# Patient Record
Sex: MALE | Race: WHITE | HISPANIC OR LATINO | Employment: FULL TIME | URBAN - METROPOLITAN AREA
[De-identification: names, ages, dates, MRNs, and addresses within clinical notes are randomized per-mention and may not be internally consistent; named-entity substitution may affect disease eponyms.]

---

## 2017-06-28 ENCOUNTER — OFFICE VISIT (OUTPATIENT)
Dept: DIABETES | Facility: CLINIC | Age: 57
End: 2017-06-28
Payer: COMMERCIAL

## 2017-06-28 ENCOUNTER — LAB VISIT (OUTPATIENT)
Dept: LAB | Facility: HOSPITAL | Age: 57
End: 2017-06-28
Attending: PEDIATRICS
Payer: COMMERCIAL

## 2017-06-28 VITALS
DIASTOLIC BLOOD PRESSURE: 78 MMHG | BODY MASS INDEX: 31.34 KG/M2 | SYSTOLIC BLOOD PRESSURE: 142 MMHG | HEIGHT: 69 IN | WEIGHT: 211.63 LBS

## 2017-06-28 LAB
ALT SERPL W/O P-5'-P-CCNC: 42 U/L
AST SERPL-CCNC: 27 U/L
BASOPHILS # BLD AUTO: 0.05 K/UL
BASOPHILS NFR BLD: 0.7 %
C PEPTIDE SERPL-MCNC: 2.75 NG/ML
CHOLEST/HDLC SERPL: 6.2 {RATIO}
DIFFERENTIAL METHOD: NORMAL
EOSINOPHIL # BLD AUTO: 0.1 K/UL
EOSINOPHIL NFR BLD: 2 %
ERYTHROCYTE [DISTWIDTH] IN BLOOD BY AUTOMATED COUNT: 13 %
GLUCOSE SERPL-MCNC: 145 MG/DL
HCT VFR BLD AUTO: 45.9 %
HDL/CHOLESTEROL RATIO: 16.2 %
HDLC SERPL-MCNC: 179 MG/DL
HDLC SERPL-MCNC: 29 MG/DL
HGB BLD-MCNC: 15.1 G/DL
LDLC SERPL CALC-MCNC: 119.8 MG/DL
LYMPHOCYTES # BLD AUTO: 1.6 K/UL
LYMPHOCYTES NFR BLD: 23.1 %
MCH RBC QN AUTO: 29.7 PG
MCHC RBC AUTO-ENTMCNC: 32.9 %
MCV RBC AUTO: 90 FL
MONOCYTES # BLD AUTO: 0.5 K/UL
MONOCYTES NFR BLD: 7.2 %
NEUTROPHILS # BLD AUTO: 4.6 K/UL
NEUTROPHILS NFR BLD: 66.7 %
NONHDLC SERPL-MCNC: 150 MG/DL
PLATELET # BLD AUTO: 276 K/UL
PMV BLD AUTO: 10.5 FL
RBC # BLD AUTO: 5.09 M/UL
TRIGL SERPL-MCNC: 151 MG/DL
TSH SERPL DL<=0.005 MIU/L-ACNC: 2.54 UIU/ML
WBC # BLD AUTO: 6.84 K/UL

## 2017-06-28 PROCEDURE — 80061 LIPID PANEL: CPT

## 2017-06-28 PROCEDURE — 80069 RENAL FUNCTION PANEL: CPT

## 2017-06-28 PROCEDURE — 99999 PR PBB SHADOW E&M-EST. PATIENT-LVL III: CPT | Mod: PBBFAC,,, | Performed by: PHYSICIAN ASSISTANT

## 2017-06-28 PROCEDURE — 36415 COLL VENOUS BLD VENIPUNCTURE: CPT | Mod: PO

## 2017-06-28 PROCEDURE — 86341 ISLET CELL ANTIBODY: CPT

## 2017-06-28 PROCEDURE — 99214 OFFICE O/P EST MOD 30 MIN: CPT | Mod: S$GLB,,, | Performed by: PHYSICIAN ASSISTANT

## 2017-06-28 PROCEDURE — 84681 ASSAY OF C-PEPTIDE: CPT

## 2017-06-28 PROCEDURE — 83036 HEMOGLOBIN GLYCOSYLATED A1C: CPT

## 2017-06-28 PROCEDURE — 84460 ALANINE AMINO (ALT) (SGPT): CPT

## 2017-06-28 PROCEDURE — 85025 COMPLETE CBC W/AUTO DIFF WBC: CPT

## 2017-06-28 PROCEDURE — 82947 ASSAY GLUCOSE BLOOD QUANT: CPT

## 2017-06-28 PROCEDURE — 4010F ACE/ARB THERAPY RXD/TAKEN: CPT | Mod: S$GLB,,, | Performed by: PHYSICIAN ASSISTANT

## 2017-06-28 PROCEDURE — 84443 ASSAY THYROID STIM HORMONE: CPT

## 2017-06-28 PROCEDURE — 84450 TRANSFERASE (AST) (SGOT): CPT

## 2017-06-28 RX ORDER — ALLOPURINOL 100 MG/1
100 TABLET ORAL
COMMUNITY
End: 2017-10-16 | Stop reason: SDUPTHER

## 2017-06-28 RX ORDER — LOSARTAN POTASSIUM 50 MG/1
50 TABLET ORAL DAILY
COMMUNITY
End: 2017-06-28 | Stop reason: SDUPTHER

## 2017-06-28 RX ORDER — DAPAGLIFLOZIN 5 MG/1
5 TABLET, FILM COATED ORAL DAILY
Qty: 90 TABLET | Refills: 3 | Status: SHIPPED | OUTPATIENT
Start: 2017-06-28 | End: 2017-06-29 | Stop reason: CLARIF

## 2017-06-28 RX ORDER — DAPAGLIFLOZIN 5 MG/1
5 TABLET, FILM COATED ORAL DAILY
Qty: 30 TABLET | Refills: 11 | Status: SHIPPED | OUTPATIENT
Start: 2017-06-28 | End: 2017-06-28 | Stop reason: SDUPTHER

## 2017-06-28 RX ORDER — LOSARTAN POTASSIUM 50 MG/1
50 TABLET ORAL DAILY
Qty: 90 TABLET | Refills: 3 | Status: SHIPPED | OUTPATIENT
Start: 2017-06-28 | End: 2018-09-16 | Stop reason: SDUPTHER

## 2017-06-28 RX ORDER — METFORMIN HYDROCHLORIDE 500 MG/1
500 TABLET, EXTENDED RELEASE ORAL
Qty: 90 TABLET | Refills: 3 | Status: SHIPPED | OUTPATIENT
Start: 2017-06-28 | End: 2018-11-08

## 2017-06-28 RX ORDER — DOXAZOSIN 4 MG/1
2 TABLET ORAL DAILY
COMMUNITY
End: 2017-10-25 | Stop reason: SDUPTHER

## 2017-06-28 RX ORDER — PANTOPRAZOLE SODIUM 40 MG/1
40 TABLET, DELAYED RELEASE ORAL
COMMUNITY
End: 2018-11-16

## 2017-06-28 RX ORDER — CYCLOBENZAPRINE HCL 10 MG
10 TABLET ORAL 3 TIMES DAILY PRN
Qty: 30 TABLET | Refills: 1 | Status: SHIPPED | OUTPATIENT
Start: 2017-06-28 | End: 2018-03-27

## 2017-06-28 RX ORDER — ATORVASTATIN CALCIUM 20 MG/1
20 TABLET, FILM COATED ORAL EVERY OTHER DAY
COMMUNITY
End: 2018-11-08

## 2017-06-28 NOTE — TELEPHONE ENCOUNTER
----- Message from Uriah Woods sent at 6/28/2017  1:21 PM CDT -----  Contact: Sparkle from [facility]   States pt needs a refill on his BP med/ needs to have a new rx sent over     1. What is the name of the medication you are requesting? losartin  2. What is the dose? 50mg  3. How do you take the medication? Orally, topically, etc? Orally   4. How often do you take this medication? Once daily   5. Do you need a 30 day or 90 day supply? 90 day  6. How many refills are you requesting? 1   7. What is your preferred pharmacy and location of the pharmacy? [phone]  8. Who can we contact with further questions? Sparkle at [facility] [phone]//adair

## 2017-06-28 NOTE — PROGRESS NOTES
PCP: Primary Doctor No    Subjective:      HISTORY OF PRESENT ILLNESS:   Shelton Young Is a pleasant 57 y.o. White male, he is in clinic today to establish care for diabetes mellitus. He was diagnosed as a type II diabetic in 2016. He was offered to be enrolled in diabetes education classes however he declined stating that he gets this through his job.  He also voices concern about taking medication for diabetes stating that it overrides his body's ability to control her sugar on its own. I advised him that that is exactly what the definition of diabetes is.  Your body cannot control blood sugars in the fasting and post meal excursion of the blood sugar.  In had discussions on his ability to monitor his portion sizes and also his carbohydrate intake and he informs me that he has been able to do this in the past.  We also discussed the fact that he was not able to continue it so his diabetes is back to its previous state.  It is very important for him to manage his activity level, meal content and frequency, as well as medications to help as well.  I did advise him that I would need to check his biochemical markers for diabetes and diabetes complications and we would discuss more after I have reviewed them.    His comorbid conditions are, Diabetes Type 2, Hyperlipidemia and Obesity     He has the following diabetic complications, without complications    Blood glucose testing is not performed. He reports polyuria secondary to BPH, denies polydipsia, polyphagia, or blurred vision. Also denies nausea, vomiting, diarrhea, fever or hypoglycemic symptoms. He has not had any recent hospitalizations or significant hypoglycemia involving EMS or requiring assistance from others.       Blood Glucose reading in clinic: No results found for: POCGLU     Past Medical History:   Diagnosis Date    Borderline systolic HTN     Diabetes mellitus, type 2 Diagnosed 2015    Prediabetes      Family History   Problem Relation Age of  Onset    Diabetes Mother     Heart disease Mother     Hypertension Mother     Diabetes Father     Heart disease Father     Hypertension Father     Diabetes Brother     Hypertension Brother     Heart disease Brother      Social History     Social History    Marital status:      Spouse name: N/A    Number of children: N/A    Years of education: N/A     Occupational History    Not on file.     Social History Main Topics    Smoking status: Never Smoker    Smokeless tobacco: Not on file    Alcohol use Yes    Drug use: No    Sexual activity: Not on file     Other Topics Concern    Not on file     Social History Narrative    No narrative on file     DM MEDICATIONS:   Current Outpatient Prescriptions:     allopurinol (ZYLOPRIM) 100 MG tablet, Take 100 mg by mouth as needed., Disp: , Rfl:     ASCORBATE CALCIUM (VITAMIN C ORAL), Take by mouth every other day., Disp: , Rfl:     CALCIUM CARBONATE/VITAMIN D3 (VITAMIN D-3 ORAL), Take by mouth., Disp: , Rfl:     doxazosin (CARDURA) 4 MG tablet, Take 2 mg by mouth once daily., Disp: , Rfl:     GLUTAMINE ORAL, Take by mouth., Disp: , Rfl:     LACTOBAC NO.41/BIFIDOBACT NO.7 (PROBIOTIC-10 ORAL), Take by mouth., Disp: , Rfl:     losartan (COZAAR) 50 MG tablet, Take 50 mg by mouth once daily., Disp: , Rfl:     MAGNESIUM ORAL, Take by mouth., Disp: , Rfl:     POTASSIUM ORAL, Take by mouth every other day., Disp: , Rfl:     VITAMIN K2 ORAL, Take by mouth., Disp: , Rfl:     atorvastatin (LIPITOR) 20 MG tablet, Take 20 mg by mouth every other day., Disp: , Rfl:     cyclobenzaprine (FLEXERIL) 10 MG tablet, Take 1 tablet (10 mg total) by mouth 3 (three) times daily as needed for Muscle spasms., Disp: 30 tablet, Rfl: 1    dapagliflozin (FARXIGA) 5 mg Tab tablet, Take 1 tablet (5 mg total) by mouth once daily., Disp: 30 tablet, Rfl: 11    metformin (GLUCOPHAGE-XR) 500 MG 24 hr tablet, Take 1 tablet (500 mg total) by mouth daily with breakfast., Disp: 90  tablet, Rfl: 3    pantoprazole (PROTONIX) 40 MG tablet, Take 40 mg by mouth once daily., Disp: , Rfl:     Shelton is noncompliant much of the time with DM medications.     Shelton is noncompliant much of the time with lifestyle modifications to include activity and meal planning.     Review of Systems   Constitutional: Negative.  Negative for activity change, appetite change, chills, diaphoresis, fatigue, fever and unexpected weight change.   HENT: Negative.  Negative for dental problem, facial swelling, hearing loss, nosebleeds, trouble swallowing and voice change.    Eyes: Negative.  Negative for photophobia, pain, discharge, redness, itching and visual disturbance.   Respiratory: Negative.  Negative for cough, choking, chest tightness, shortness of breath and wheezing.    Cardiovascular: Negative.  Negative for chest pain, palpitations and leg swelling.   Gastrointestinal: Negative.  Negative for abdominal distention, abdominal pain, blood in stool, constipation, diarrhea, nausea and vomiting.   Endocrine: Negative.  Negative for cold intolerance, heat intolerance, polydipsia, polyphagia and polyuria.   Genitourinary: Negative.  Negative for decreased urine volume, difficulty urinating, dysuria, frequency, scrotal swelling, testicular pain and urgency.   Musculoskeletal: Negative.  Negative for arthralgias, back pain, gait problem, joint swelling, myalgias, neck pain and neck stiffness.   Skin: Negative.  Negative for color change, pallor, rash and wound.   Allergic/Immunologic: Negative.  Negative for environmental allergies, food allergies and immunocompromised state.   Neurological: Negative.  Negative for dizziness, tremors, seizures, syncope, facial asymmetry, speech difficulty, weakness, light-headedness, numbness and headaches.   Hematological: Negative.  Negative for adenopathy. Does not bruise/bleed easily.   Psychiatric/Behavioral: Negative.  Negative for agitation, behavioral problems, confusion,  "decreased concentration, dysphoric mood, hallucinations, self-injury, sleep disturbance and suicidal ideas. The patient is not nervous/anxious and is not hyperactive.        Objective:   Last 3 sets of Vitals:  Vitals - 1 value per visit 6/28/2017   SYSTOLIC 142   DIASTOLIC 78   Weight (lb) 211.64   Weight (kg) 96   HEIGHT 5' 9"   BODY MASS INDEX 31.25   VISIT REPORT -          Physical Exam   Constitutional: He is oriented to person, place, and time. He appears well-developed and well-nourished. He is cooperative.  Non-toxic appearance. He does not have a sickly appearance. He does not appear ill. No distress. He is not intubated.   HENT:   Head: Normocephalic and atraumatic. Not macrocephalic and not microcephalic. Head is without raccoon's eyes, without Boone's sign, without abrasion, without contusion, without laceration, without right periorbital erythema and without left periorbital erythema. Hair is normal.   Right Ear: External ear normal. No lacerations. No drainage, swelling or tenderness. No foreign bodies. No mastoid tenderness. Tympanic membrane is not injected, not scarred, not perforated, not erythematous, not retracted and not bulging. Tympanic membrane mobility is normal. No middle ear effusion. No hemotympanum. No decreased hearing is noted.   Left Ear: External ear normal. No lacerations. No drainage, swelling or tenderness. No foreign bodies. No mastoid tenderness. Tympanic membrane is not injected, not scarred, not perforated, not erythematous, not retracted and not bulging. Tympanic membrane mobility is normal.  No middle ear effusion. No hemotympanum. No decreased hearing is noted.   Nose: Nose normal.   Mouth/Throat: Oropharynx is clear and moist.   Eyes: EOM and lids are normal. Pupils are equal, round, and reactive to light. Right eye exhibits no chemosis, no discharge, no exudate and no hordeolum. No foreign body present in the right eye. Left eye exhibits no chemosis, no discharge, no " exudate and no hordeolum. No foreign body present in the left eye. Right conjunctiva is not injected. Right conjunctiva has no hemorrhage. Left conjunctiva is not injected. Left conjunctiva has no hemorrhage. No scleral icterus. Right eye exhibits normal extraocular motion and no nystagmus. Left eye exhibits normal extraocular motion and no nystagmus. Right pupil is round and reactive. Left pupil is round and reactive. Pupils are equal.   Neck: Normal range of motion, full passive range of motion without pain and phonation normal. Neck supple. Normal carotid pulses, no hepatojugular reflux and no JVD present. No tracheal tenderness, no spinous process tenderness and no muscular tenderness present. Carotid bruit is not present. No neck rigidity. No tracheal deviation, no edema, no erythema and normal range of motion present. No thyroid mass and no thyromegaly present.   Cardiovascular: Normal rate, regular rhythm, normal heart sounds and intact distal pulses.   No extrasystoles are present. PMI is not displaced.  Exam reveals no gallop, no friction rub and no decreased pulses.    No murmur heard.  Pulses:       Carotid pulses are 2+ on the right side, and 2+ on the left side.       Radial pulses are 2+ on the right side, and 2+ on the left side.        Dorsalis pedis pulses are 2+ on the right side, and 2+ on the left side.        Posterior tibial pulses are 2+ on the right side, and 2+ on the left side.   Pulmonary/Chest: Effort normal and breath sounds normal. No accessory muscle usage or stridor. No apnea, no tachypnea and no bradypnea. He is not intubated. No respiratory distress. He has no decreased breath sounds. He has no wheezes. He has no rhonchi. He has no rales. He exhibits no tenderness.   Abdominal: Soft. Bowel sounds are normal. He exhibits no shifting dullness, no distension, no pulsatile liver, no fluid wave, no abdominal bruit, no ascites, no pulsatile midline mass and no mass. There is no  hepatosplenomegaly, splenomegaly or hepatomegaly. There is no tenderness. There is no rigidity, no rebound, no guarding, no CVA tenderness and no tenderness at McBurney's point. No hernia. Hernia confirmed negative in the ventral area.   Musculoskeletal: Normal range of motion. He exhibits no edema or tenderness.        Right foot: There is normal range of motion and no deformity.        Left foot: There is normal range of motion and no deformity.   Feet:   Right Foot:   Protective Sensation: 6 sites tested. 6 sites sensed.   Skin Integrity: Negative for ulcer, blister, skin breakdown, erythema, warmth, callus or dry skin.   Left Foot:   Protective Sensation: 6 sites tested. 6 sites sensed.   Skin Integrity: Negative for ulcer, blister, skin breakdown, erythema, warmth, callus or dry skin.   Lymphadenopathy:        Head (right side): No submental, no submandibular, no tonsillar, no preauricular, no posterior auricular and no occipital adenopathy present.        Head (left side): No submental, no submandibular, no tonsillar, no preauricular, no posterior auricular and no occipital adenopathy present.     He has no cervical adenopathy.        Right cervical: No superficial cervical, no deep cervical and no posterior cervical adenopathy present.       Left cervical: No superficial cervical, no deep cervical and no posterior cervical adenopathy present.   Neurological: He is alert and oriented to person, place, and time. He has normal reflexes. He displays no atrophy and no tremor. No cranial nerve deficit or sensory deficit. He exhibits normal muscle tone. He displays no seizure activity. Coordination and gait normal.   Reflex Scores:       Bicep reflexes are 2+ on the right side and 2+ on the left side.       Brachioradialis reflexes are 2+ on the right side and 2+ on the left side.       Patellar reflexes are 2+ on the right side and 2+ on the left side.  Skin: Skin is warm and dry. No rash noted. No erythema. No  pallor.   Psychiatric: He has a normal mood and affect. His mood appears not anxious. His affect is not angry, not blunt, not labile and not inappropriate. His speech is not rapid and/or pressured, not delayed, not tangential and not slurred. He is not agitated, not aggressive, not hyperactive, not slowed, not withdrawn, not actively hallucinating and not combative. Thought content is not paranoid and not delusional. Cognition and memory are not impaired. He does not express impulsivity or inappropriate judgment. He does not exhibit a depressed mood. He expresses no homicidal and no suicidal ideation. He expresses no suicidal plans and no homicidal plans. He is communicative. He exhibits normal recent memory and normal remote memory. He is attentive.         STANDARDS OF CARE:  Eye doctor: None, last exam Has appointment coming up.  Dental exam: Recommend regular exams; denies gums bleeding.  Podiatry doctor:    ACTIVITY LEVEL: He exercises never since on the job injury of his back.  MEAL PLANNING: Number of meals per day - 3. Number of snacks per day - 2.  Per dietary recall, patient is not limiting carbohydrates, saturated fats and sodium.   BLOOD GLUCOSE TESTING: Self-monitoring with   SOCIAL HISTORY: . Lives with spouse. 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 Impediments in learning class environment - NONE.  2. Needs improvement in self-care management skills.    1. Uncontrolled type 2 diabetes mellitus without complication, without long-term current use of insulin          Plan:   Shelton Young is seen today for   1. Uncontrolled type 2 diabetes mellitus without complication, without long-term current use of insulin      We have discussed the etiology and treatment options associated with the diagnosis as well as alternatives. He has elected the following treatments.     Uncontrolled type 2 diabetes mellitus without  complication, without long-term current use of insulin  -     Hemoglobin A1c; Future; Expected date: 06/28/2017  -     Renal function panel; Future; Expected date: 06/28/2017  -     ALT (SGPT); Future; Expected date: 06/28/2017  -     AST (SGOT); Future; Expected date: 06/28/2017  -     Lipid panel; Future; Expected date: 06/28/2017  -     TSH; Future; Expected date: 06/28/2017  -     Microalbumin/creatinine urine ratio; Future; Expected date: 06/28/2017  -     Glucose, fasting; Future; Expected date: 06/28/2017  -     C-peptide; Future; Expected date: 06/28/2017  -     Glutamic acid decarboxylase; Future; Expected date: 06/28/2017  -     CBC auto differential; Future; Expected date: 06/28/2017  -     dapagliflozin (FARXIGA) 5 mg Tab tablet; Take 1 tablet (5 mg total) by mouth once daily.  Dispense: 30 tablet; Refill: 11  -     cyclobenzaprine (FLEXERIL) 10 MG tablet; Take 1 tablet (10 mg total) by mouth 3 (three) times daily as needed for Muscle spasms.  Dispense: 30 tablet; Refill: 1  -     metformin (GLUCOPHAGE-XR) 500 MG 24 hr tablet; Take 1 tablet (500 mg total) by mouth daily with breakfast.  Dispense: 90 tablet; Refill: 3    1.) Patient was instructed to monitor blood glucose twice daily, fasting, and 2 hour post meal. Reminded to bring BG records or meter to each visit for review.  2.) Reviewed pathophysiology of type 2 diabetes, complications related to the disease, importance of annual dilated eye exam and self daily foot examination.  3.) Continue medications as prescribed Farxiga and Metformin. [facility] MyChart or Phone review in 1 week with BG records for adjustment of medication.  4.) Reviewed carb counting, portion control, importance of spacing meals throughout the day to prevent post prandial elevations. Recommended low saturated fat, low sodium diet to aid in control of hypertension and cholesterol.  5.) Discussed activity, benefits, methods, and precautions. Recommended patient start/continue some  form of exercise and increase as tolerated to 60 minutes per day to facilitate weight loss and aid in control of BGs. Also reminded patient of WHO recommendation of 10,000 steps daily as a goal.   6.) A1C, TSH, Lipid Panel, AST; ALT, Renal panel with eGFR and Micro/Creatinine.  7.) Return to clinic in 3 months for follow up. Advised patient to call clinic with any questions or concerns.    A total of 60 minutes was spent in face to face time, of which 50 % was spent in counseling patient on disease process, complications, treatment, and side effects of medications.    The patient was explained the above plan and given opportunity to ask questions.  He understands, chooses and consents to this plan and accepts all the risks, which include but are not limited to the risks mentioned above.   He understands the alternative of having no testing, interventions or treatments at this time. He left content and without further questions.

## 2017-06-29 LAB
ALBUMIN SERPL BCP-MCNC: 3.8 G/DL
ANION GAP SERPL CALC-SCNC: 8 MMOL/L
BUN SERPL-MCNC: 15 MG/DL
CALCIUM SERPL-MCNC: 9.3 MG/DL
CHLORIDE SERPL-SCNC: 105 MMOL/L
CO2 SERPL-SCNC: 29 MMOL/L
CREAT SERPL-MCNC: 1.2 MG/DL
EST. GFR  (AFRICAN AMERICAN): >60 ML/MIN/1.73 M^2
EST. GFR  (NON AFRICAN AMERICAN): >60 ML/MIN/1.73 M^2
ESTIMATED AVG GLUCOSE: 148 MG/DL
GLUCOSE SERPL-MCNC: 138 MG/DL
HBA1C MFR BLD HPLC: 6.8 %
PHOSPHATE SERPL-MCNC: 2.7 MG/DL
POTASSIUM SERPL-SCNC: 4.1 MMOL/L
SODIUM SERPL-SCNC: 142 MMOL/L

## 2017-06-30 LAB — GAD65 AB SER-SCNC: 0 NMOL/L

## 2017-10-17 RX ORDER — ALLOPURINOL 100 MG/1
100 TABLET ORAL DAILY
Qty: 30 TABLET | Refills: 6 | Status: SHIPPED | OUTPATIENT
Start: 2017-10-17 | End: 2017-11-29 | Stop reason: SDUPTHER

## 2017-10-17 RX ORDER — ALLOPURINOL 100 MG/1
100 TABLET ORAL
Qty: 30 TABLET | Refills: 6 | Status: SHIPPED | OUTPATIENT
Start: 2017-10-17 | End: 2017-10-17 | Stop reason: SDUPTHER

## 2017-10-17 NOTE — TELEPHONE ENCOUNTER
----- Message from Matthew Morel sent at 10/17/2017 10:35 AM CDT -----  Contact: [facility]/ [place]  Pharmacy is calling nurse staff regarding clarification on the RX Allopurinol with the dosage and the mg.'s of medication . Please call or fax back to the Pharmacy    [facility] [address] [place] [address]  [address] [facility] [place] [address]  Phone: [phone] Fax: [phone]

## 2017-10-25 RX ORDER — DOXAZOSIN 4 MG/1
2 TABLET ORAL DAILY
Qty: 30 TABLET | Refills: 6 | Status: SHIPPED | OUTPATIENT
Start: 2017-10-25 | End: 2017-11-29 | Stop reason: SDUPTHER

## 2017-11-28 NOTE — PROGRESS NOTES
Subjective:      Patient ID: Shelton Young is a 57 y.o. male.    PCP: Primary Doctor No      Shelton Young is a pleasant 57 y.o. male presenting to follow up on diabetes mellitus. He has had diabetes for 2 or more years. His last visit in Diabetes Management was 6/28/2017. Since that time he has had significant improvement in his glycemia. His blood sugar range fasting has been 120's and 2 hour post meal has been 140's, and he has been monitoring 2 times per day. His current concerns are glycemic control.    He denies any hospital admissions, emergency room visits, hypoglycemia, syncope, diaphoresis, chest pain, or dyspnea.    He has lost 16 pounds since last visit. His BMI is 28.91    His blood sugar in the clinic today was:   Lab Results   Component Value Date    POCGLU 120 (A) 11/29/2017     We discussed the American diabetes Association recommendations:  hemoglobin A1c below 7.0%; all diabetics should be on statins unless contraindicated; one aspirin daily unless contraindicated; fasting blood sugar between 80 and 130 mg/dL; postprandial blood sugar below 180 mg/dl; prevention of hypoglycemia, may adjust goals to higher levels if persistent; ACE or ARB therapy if not contraindicated; and maintain in an ideal body weight with BMI below 25.    Shelton is compliant most of the time with DM medications.     Shelton is compliant most of the time with lifestyle modifications to include activity and meal planning.       Current Outpatient Prescriptions:     allopurinol (ZYLOPRIM) 100 MG tablet, Take 1 tablet (100 mg total) by mouth once daily., Disp: 30 tablet, Rfl: 6    ASCORBATE CALCIUM (VITAMIN C ORAL), Take by mouth every other day., Disp: , Rfl:     atorvastatin (LIPITOR) 20 MG tablet, Take 20 mg by mouth every other day., Disp: , Rfl:     CALCIUM CARBONATE/VITAMIN D3 (VITAMIN D-3 ORAL), Take by mouth., Disp: , Rfl:     cyclobenzaprine (FLEXERIL) 10 MG tablet, Take 1 tablet (10 mg total) by mouth 3  (three) times daily as needed for Muscle spasms., Disp: 30 tablet, Rfl: 1    doxazosin (CARDURA) 4 MG tablet, Take 0.5 tablets (2 mg total) by mouth once daily., Disp: 30 tablet, Rfl: 6    empagliflozin (JARDIANCE) 10 mg Tab, Take 10 mg by mouth once daily., Disp: 30 tablet, Rfl: 6    GLUTAMINE ORAL, Take by mouth., Disp: , Rfl:     LACTOBAC NO.41/BIFIDOBACT NO.7 (PROBIOTIC-10 ORAL), Take by mouth., Disp: , Rfl:     losartan (COZAAR) 50 MG tablet, Take 1 tablet (50 mg total) by mouth once daily., Disp: 90 tablet, Rfl: 3    MAGNESIUM ORAL, Take by mouth., Disp: , Rfl:     metformin (GLUCOPHAGE-XR) 500 MG 24 hr tablet, Take 1 tablet (500 mg total) by mouth daily with breakfast., Disp: 90 tablet, Rfl: 3    pantoprazole (PROTONIX) 40 MG tablet, Take 40 mg by mouth once daily., Disp: , Rfl:     POTASSIUM ORAL, Take by mouth every other day., Disp: , Rfl:     VITAMIN K2 ORAL, Take by mouth., Disp: , Rfl:     STANDARDS OF CARE:  Eye doctor: None, last exam Has appointment coming up.  Dental exam: Recommend regular exams; denies gums bleeding.  Podiatry doctor:     ACTIVITY LEVEL: He exercises never since on the job injury of his back.  MEAL PLANNING: Number of meals per day - 3. Number of snacks per day - 2.  Per dietary recall, patient is not limiting carbohydrates, saturated fats and sodium.   BLOOD GLUCOSE TESTING: Self-monitoring with   SOCIAL HISTORY: . Lives with spouse. 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    Lab Results   Component Value Date    WBC 6.84 06/28/2017    HGB 15.1 06/28/2017    HCT 45.9 06/28/2017     06/28/2017    CHOL 179 06/28/2017    TRIG 151 (H) 06/28/2017     HDL 29 (L) 06/28/2017    ALT 42 06/28/2017    AST 27 06/28/2017     06/28/2017    K 4.1 06/28/2017     06/28/2017    CREATININE 1.2 06/28/2017    ESTGFRAFRICA >60.0 06/28/2017    EGFRNONAA >60.0 06/28/2017    BUN 15 06/28/2017    CO2 29 06/28/2017    TSH 2.542 06/28/2017     (H) 06/28/2017       Lab Results   Component Value Date    HGBA1C 6.8 (H) 06/28/2017       Lab Results   Component Value Date    GLUTAMICACID 0.00 06/28/2017    CPEPTIDE 2.75 06/28/2017     Lab Results   Component Value Date    TSH 2.542 06/28/2017     Lab Results   Component Value Date    CALCIUM 9.3 06/28/2017    PHOS 2.7 06/28/2017       Review of patient's allergies indicates:  No Known Allergies    Past Medical History:   Diagnosis Date    Borderline systolic HTN     Diabetes mellitus, type 2 Diagnosed 2015    Prediabetes        Review of Systems   Constitutional: Negative.  Negative for activity change, appetite change, chills, diaphoresis, fatigue, fever and unexpected weight change.   HENT: Negative.  Negative for dental problem, facial swelling, hearing loss, nosebleeds, trouble swallowing and voice change.    Eyes: Negative.  Negative for photophobia, pain, discharge, redness, itching and visual disturbance.   Respiratory: Negative.  Negative for cough, choking, chest tightness, shortness of breath and wheezing.    Cardiovascular: Negative.  Negative for chest pain, palpitations and leg swelling.   Gastrointestinal: Negative.  Negative for abdominal distention, abdominal pain, blood in stool, constipation, diarrhea, nausea and vomiting.   Endocrine: Negative.  Negative for cold intolerance, heat intolerance, polydipsia, polyphagia and polyuria.   Genitourinary: Negative.  Negative for decreased urine volume, difficulty urinating, dysuria, frequency, scrotal swelling, testicular pain and urgency.   Musculoskeletal: Negative.  Negative for arthralgias, back pain, gait problem, joint swelling, myalgias, neck pain and  "neck stiffness.   Skin: Negative.  Negative for color change, pallor, rash and wound.   Allergic/Immunologic: Negative.  Negative for environmental allergies, food allergies and immunocompromised state.   Neurological: Negative.  Negative for dizziness, tremors, seizures, syncope, facial asymmetry, speech difficulty, weakness, light-headedness, numbness and headaches.   Hematological: Negative.  Negative for adenopathy. Does not bruise/bleed easily.   Psychiatric/Behavioral: Negative.  Negative for agitation, behavioral problems, confusion, decreased concentration, dysphoric mood, hallucinations, self-injury, sleep disturbance and suicidal ideas. The patient is not nervous/anxious and is not hyperactive.       Objective:     Vitals - 1 value per visit 6/28/2017   SYSTOLIC 142   DIASTOLIC 78   Weight (lb) 211.64   Weight (kg) 96   HEIGHT 5' 9"   BODY MASS INDEX 31.25   VISIT REPORT -       Physical Exam   Constitutional: He is oriented to person, place, and time. He appears well-developed and well-nourished. He is cooperative.  Non-toxic appearance. He does not have a sickly appearance. He does not appear ill. No distress. He is not intubated.   HENT:   Head: Normocephalic and atraumatic. Not macrocephalic and not microcephalic. Head is without raccoon's eyes, without Boone's sign, without abrasion, without contusion, without laceration, without right periorbital erythema and without left periorbital erythema. Hair is normal.   Right Ear: External ear normal. No lacerations. No drainage, swelling or tenderness. No foreign bodies. No mastoid tenderness. Tympanic membrane is not injected, not scarred, not perforated, not erythematous, not retracted and not bulging. Tympanic membrane mobility is normal. No middle ear effusion. No hemotympanum. No decreased hearing is noted.   Left Ear: External ear normal. No lacerations. No drainage, swelling or tenderness. No foreign bodies. No mastoid tenderness. Tympanic membrane " is not injected, not scarred, not perforated, not erythematous, not retracted and not bulging. Tympanic membrane mobility is normal.  No middle ear effusion. No hemotympanum. No decreased hearing is noted.   Nose: Nose normal.   Mouth/Throat: Oropharynx is clear and moist.   Eyes: EOM and lids are normal. Pupils are equal, round, and reactive to light. Right eye exhibits no chemosis, no discharge, no exudate and no hordeolum. No foreign body present in the right eye. Left eye exhibits no chemosis, no discharge, no exudate and no hordeolum. No foreign body present in the left eye. Right conjunctiva is not injected. Right conjunctiva has no hemorrhage. Left conjunctiva is not injected. Left conjunctiva has no hemorrhage. No scleral icterus. Right eye exhibits normal extraocular motion and no nystagmus. Left eye exhibits normal extraocular motion and no nystagmus. Right pupil is round and reactive. Left pupil is round and reactive. Pupils are equal.   Neck: Normal range of motion, full passive range of motion without pain and phonation normal. Neck supple. Normal carotid pulses, no hepatojugular reflux and no JVD present. No tracheal tenderness, no spinous process tenderness and no muscular tenderness present. Carotid bruit is not present. No neck rigidity. No tracheal deviation, no edema, no erythema and normal range of motion present. No thyroid mass and no thyromegaly present.   Cardiovascular: Normal rate, regular rhythm, normal heart sounds and intact distal pulses.   No extrasystoles are present. PMI is not displaced.  Exam reveals no gallop, no friction rub and no decreased pulses.    No murmur heard.  Pulses:       Carotid pulses are 2+ on the right side, and 2+ on the left side.       Radial pulses are 2+ on the right side, and 2+ on the left side.        Dorsalis pedis pulses are 2+ on the right side, and 2+ on the left side.        Posterior tibial pulses are 2+ on the right side, and 2+ on the left side.    Pulmonary/Chest: Effort normal and breath sounds normal. No accessory muscle usage or stridor. No apnea, no tachypnea and no bradypnea. He is not intubated. No respiratory distress. He has no decreased breath sounds. He has no wheezes. He has no rhonchi. He has no rales. He exhibits no tenderness.   Abdominal: Soft. Bowel sounds are normal. He exhibits no shifting dullness, no distension, no pulsatile liver, no fluid wave, no abdominal bruit, no ascites, no pulsatile midline mass and no mass. There is no hepatosplenomegaly, splenomegaly or hepatomegaly. There is no tenderness. There is no rigidity, no rebound, no guarding, no CVA tenderness and no tenderness at McBurney's point. No hernia. Hernia confirmed negative in the ventral area.   Musculoskeletal: Normal range of motion. He exhibits no edema or tenderness.        Right foot: There is normal range of motion and no deformity.        Left foot: There is normal range of motion and no deformity.   Feet:   Right Foot:   Protective Sensation: 6 sites tested. 6 sites sensed.   Skin Integrity: Negative for ulcer, blister, skin breakdown, erythema, warmth, callus or dry skin.   Left Foot:   Protective Sensation: 6 sites tested. 6 sites sensed.   Skin Integrity: Negative for ulcer, blister, skin breakdown, erythema, warmth, callus or dry skin.   Lymphadenopathy:        Head (right side): No submental, no submandibular, no tonsillar, no preauricular, no posterior auricular and no occipital adenopathy present.        Head (left side): No submental, no submandibular, no tonsillar, no preauricular, no posterior auricular and no occipital adenopathy present.     He has no cervical adenopathy.        Right cervical: No superficial cervical, no deep cervical and no posterior cervical adenopathy present.       Left cervical: No superficial cervical, no deep cervical and no posterior cervical adenopathy present.   Neurological: He is alert and oriented to person, place, and  time. He has normal reflexes. He displays no atrophy and no tremor. No cranial nerve deficit or sensory deficit. He exhibits normal muscle tone. He displays no seizure activity. Coordination and gait normal.   Reflex Scores:       Bicep reflexes are 2+ on the right side and 2+ on the left side.       Brachioradialis reflexes are 2+ on the right side and 2+ on the left side.       Patellar reflexes are 2+ on the right side and 2+ on the left side.  Skin: Skin is warm and dry. No rash noted. No erythema. No pallor.   Psychiatric: He has a normal mood and affect. His mood appears not anxious. His affect is not angry, not blunt, not labile and not inappropriate. His speech is not rapid and/or pressured, not delayed, not tangential and not slurred. He is not agitated, not aggressive, not hyperactive, not slowed, not withdrawn, not actively hallucinating and not combative. Thought content is not paranoid and not delusional. Cognition and memory are not impaired. He does not express impulsivity or inappropriate judgment. He does not exhibit a depressed mood. He expresses no homicidal and no suicidal ideation. He expresses no suicidal plans and no homicidal plans. He is communicative. He exhibits normal recent memory and normal remote memory. He is attentive.     Assessment:     1. Uncontrolled type 2 diabetes mellitus with hyperglycemia, without long-term current use of insulin       Plan:     Shelton Young is seen today for   1. Uncontrolled type 2 diabetes mellitus with hyperglycemia, without long-term current use of insulin      We have discussed the etiology and treatment options associated with the diagnosis as well as alternatives. He has elected the following treatments.     Uncontrolled type 2 diabetes mellitus with hyperglycemia, without long-term current use of insulin  -     POCT glucose  -     Hemoglobin A1c; Future; Expected date: 11/29/2017  -     Renal function panel; Future; Expected date: 11/29/2017  -      ALT (SGPT); Future; Expected date: 11/29/2017  -     AST (SGOT); Future; Expected date: 11/29/2017  -     Lipid panel; Future; Expected date: 11/29/2017  -     TSH; Future; Expected date: 11/29/2017  -     Microalbumin/creatinine urine ratio; Future; Expected date: 11/29/2017  -     CBC auto differential; Future; Expected date: 11/29/2017    Other orders  -     allopurinol (ZYLOPRIM) 100 MG tablet; Take 1 tablet (100 mg total) by mouth once daily.  Dispense: 30 tablet; Refill: 6  -     doxazosin (CARDURA) 4 MG tablet; Take 0.5 tablets (2 mg total) by mouth once daily.  Dispense: 30 tablet; Refill: 6      1.) Patient was instructed to monitor blood glucose twice daily, fasting, and 2 hour post meal; if on Multiple Daily Injections (MDI) he will need to have pre-meal blood glucose as well. Reminded to bring BG meter or record to each visit for review.  2.) Reviewed pathophysiology of diabetes, complications related to the disease, importance of annual dilated eye exam and self daily foot examination.  3.) Continue medications as prescribed Metformin and Jardiance. [facility] MyChart or Phone review in 1 week with [facility] records for adjustment of medication.  4.) Advised patient to continue to follow up with Dietician/CDE as directed.  5.) Discussed activity, benefits, methods, and precautions. Recommended patient start/continue some form of exercise and increase as tolerated to 60 minutes per day to facilitate weight loss and aid in control of BGs. Also reminded patient of WHO recommendation of 10,000 steps daily as a goal.   6.) A1C, TSH, Lipid Panel, CMP/renal panel with eGFR and Micro/Creatinine prior to next visit, if not already done.  7.) Return to clinic in 4 months for follow up. Advised patient to call clinic with any questions or concerns.    A total of 30 minutes was spent in face to face time, of which 50 % was spent in counseling patient on disease process, complications, treatment, and side effects of  medications.    The patient was explained the above plan and given opportunity to ask questions.  He understands, chooses and consents to this plan and accepts all the risks, which include but are not limited to the risks mentioned above.   He understands the alternative of having no testing, interventions or treatments at this time. He left content and without further questions.

## 2017-11-29 ENCOUNTER — OFFICE VISIT (OUTPATIENT)
Dept: DIABETES | Facility: CLINIC | Age: 57
End: 2017-11-29
Payer: COMMERCIAL

## 2017-11-29 ENCOUNTER — LAB VISIT (OUTPATIENT)
Dept: LAB | Facility: HOSPITAL | Age: 57
End: 2017-11-29
Attending: PHYSICIAN ASSISTANT
Payer: COMMERCIAL

## 2017-11-29 VITALS
DIASTOLIC BLOOD PRESSURE: 80 MMHG | SYSTOLIC BLOOD PRESSURE: 130 MMHG | WEIGHT: 195.75 LBS | HEIGHT: 69 IN | BODY MASS INDEX: 28.99 KG/M2

## 2017-11-29 DIAGNOSIS — E11.65 UNCONTROLLED TYPE 2 DIABETES MELLITUS WITH HYPERGLYCEMIA, WITHOUT LONG-TERM CURRENT USE OF INSULIN: ICD-10-CM

## 2017-11-29 DIAGNOSIS — E11.65 UNCONTROLLED TYPE 2 DIABETES MELLITUS WITH HYPERGLYCEMIA, WITHOUT LONG-TERM CURRENT USE OF INSULIN: Primary | ICD-10-CM

## 2017-11-29 LAB
ALBUMIN SERPL BCP-MCNC: 4 G/DL
ALT SERPL W/O P-5'-P-CCNC: 19 U/L
ANION GAP SERPL CALC-SCNC: 8 MMOL/L
AST SERPL-CCNC: 22 U/L
BASOPHILS # BLD AUTO: 0.07 K/UL
BASOPHILS NFR BLD: 1.3 %
BUN SERPL-MCNC: 16 MG/DL
CALCIUM SERPL-MCNC: 9.3 MG/DL
CHLORIDE SERPL-SCNC: 105 MMOL/L
CHOLEST SERPL-MCNC: 173 MG/DL
CHOLEST/HDLC SERPL: 5.1 {RATIO}
CO2 SERPL-SCNC: 28 MMOL/L
CREAT SERPL-MCNC: 1.1 MG/DL
DIFFERENTIAL METHOD: NORMAL
EOSINOPHIL # BLD AUTO: 0.1 K/UL
EOSINOPHIL NFR BLD: 2.3 %
ERYTHROCYTE [DISTWIDTH] IN BLOOD BY AUTOMATED COUNT: 12.4 %
EST. GFR  (AFRICAN AMERICAN): >60 ML/MIN/1.73 M^2
EST. GFR  (NON AFRICAN AMERICAN): >60 ML/MIN/1.73 M^2
ESTIMATED AVG GLUCOSE: 128 MG/DL
GLUCOSE SERPL-MCNC: 120 MG/DL (ref 70–110)
GLUCOSE SERPL-MCNC: 130 MG/DL
HBA1C MFR BLD HPLC: 6.1 %
HCT VFR BLD AUTO: 44.8 %
HDLC SERPL-MCNC: 34 MG/DL
HDLC SERPL: 19.7 %
HGB BLD-MCNC: 15 G/DL
IMM GRANULOCYTES # BLD AUTO: 0.01 K/UL
IMM GRANULOCYTES NFR BLD AUTO: 0.2 %
LDLC SERPL CALC-MCNC: 113 MG/DL
LYMPHOCYTES # BLD AUTO: 1.4 K/UL
LYMPHOCYTES NFR BLD: 25.3 %
MCH RBC QN AUTO: 29.5 PG
MCHC RBC AUTO-ENTMCNC: 33.5 G/DL
MCV RBC AUTO: 88 FL
MONOCYTES # BLD AUTO: 0.4 K/UL
MONOCYTES NFR BLD: 7.3 %
NEUTROPHILS # BLD AUTO: 3.4 K/UL
NEUTROPHILS NFR BLD: 63.6 %
NONHDLC SERPL-MCNC: 139 MG/DL
NRBC BLD-RTO: 0 /100 WBC
PHOSPHATE SERPL-MCNC: 2.9 MG/DL
PLATELET # BLD AUTO: 259 K/UL
PMV BLD AUTO: 9.9 FL
POTASSIUM SERPL-SCNC: 4.2 MMOL/L
RBC # BLD AUTO: 5.08 M/UL
SODIUM SERPL-SCNC: 141 MMOL/L
TRIGL SERPL-MCNC: 130 MG/DL
TSH SERPL DL<=0.005 MIU/L-ACNC: 1.64 UIU/ML
WBC # BLD AUTO: 5.33 K/UL

## 2017-11-29 PROCEDURE — 84460 ALANINE AMINO (ALT) (SGPT): CPT

## 2017-11-29 PROCEDURE — 84450 TRANSFERASE (AST) (SGOT): CPT

## 2017-11-29 PROCEDURE — 85025 COMPLETE CBC W/AUTO DIFF WBC: CPT

## 2017-11-29 PROCEDURE — 84443 ASSAY THYROID STIM HORMONE: CPT

## 2017-11-29 PROCEDURE — 80069 RENAL FUNCTION PANEL: CPT

## 2017-11-29 PROCEDURE — 83036 HEMOGLOBIN GLYCOSYLATED A1C: CPT

## 2017-11-29 PROCEDURE — 82948 REAGENT STRIP/BLOOD GLUCOSE: CPT | Mod: S$GLB,,, | Performed by: PHYSICIAN ASSISTANT

## 2017-11-29 PROCEDURE — 80061 LIPID PANEL: CPT

## 2017-11-29 PROCEDURE — 36415 COLL VENOUS BLD VENIPUNCTURE: CPT | Mod: PO

## 2017-11-29 PROCEDURE — 99999 PR PBB SHADOW E&M-EST. PATIENT-LVL III: CPT | Mod: PBBFAC,,, | Performed by: PHYSICIAN ASSISTANT

## 2017-11-29 PROCEDURE — 99214 OFFICE O/P EST MOD 30 MIN: CPT | Mod: S$GLB,,, | Performed by: PHYSICIAN ASSISTANT

## 2017-11-29 RX ORDER — ALLOPURINOL 100 MG/1
100 TABLET ORAL DAILY
Qty: 30 TABLET | Refills: 6 | Status: SHIPPED | OUTPATIENT
Start: 2017-11-29 | End: 2018-11-16 | Stop reason: SDUPTHER

## 2017-11-29 RX ORDER — DOXAZOSIN 4 MG/1
2 TABLET ORAL DAILY
Qty: 30 TABLET | Refills: 6 | Status: SHIPPED | OUTPATIENT
Start: 2017-11-29 | End: 2018-03-27

## 2018-03-14 ENCOUNTER — OFFICE VISIT (OUTPATIENT)
Dept: INTERNAL MEDICINE | Facility: CLINIC | Age: 58
End: 2018-03-14
Payer: COMMERCIAL

## 2018-03-14 VITALS
OXYGEN SATURATION: 98 % | BODY MASS INDEX: 27.79 KG/M2 | WEIGHT: 187.63 LBS | HEART RATE: 70 BPM | TEMPERATURE: 97 F | HEIGHT: 69 IN | DIASTOLIC BLOOD PRESSURE: 78 MMHG | SYSTOLIC BLOOD PRESSURE: 140 MMHG

## 2018-03-14 DIAGNOSIS — R09.89 CHEST CONGESTION: Primary | ICD-10-CM

## 2018-03-14 DIAGNOSIS — M25.511 ACUTE PAIN OF RIGHT SHOULDER: ICD-10-CM

## 2018-03-14 DIAGNOSIS — J06.9 VIRAL URI WITH COUGH: ICD-10-CM

## 2018-03-14 PROCEDURE — 99214 OFFICE O/P EST MOD 30 MIN: CPT | Mod: SA,S$GLB,, | Performed by: NURSE PRACTITIONER

## 2018-03-14 PROCEDURE — 99999 PR PBB SHADOW E&M-EST. PATIENT-LVL IV: CPT | Mod: PBBFAC,,, | Performed by: NURSE PRACTITIONER

## 2018-03-14 RX ORDER — GUAIFENESIN 600 MG/1
600 TABLET, EXTENDED RELEASE ORAL 2 TIMES DAILY
Qty: 20 TABLET | Refills: 0 | Status: SHIPPED | OUTPATIENT
Start: 2018-03-14 | End: 2018-03-24

## 2018-03-14 RX ORDER — MELOXICAM 7.5 MG/1
7.5 TABLET ORAL DAILY PRN
Qty: 14 TABLET | Refills: 0 | Status: SHIPPED | OUTPATIENT
Start: 2018-03-14 | End: 2018-03-27

## 2018-03-14 RX ORDER — METHYLPREDNISOLONE 4 MG/1
TABLET ORAL
Qty: 1 PACKAGE | Refills: 0 | Status: SHIPPED | OUTPATIENT
Start: 2018-03-14 | End: 2018-03-27

## 2018-03-14 NOTE — PROGRESS NOTES
Subjective:       Patient ID: Shelton Young is a 57 y.o. male.    Chief Complaint: Cough and Chest Congestion    URI    This is a new problem. The current episode started in the past 7 days (pt expresses concern about his 3 children having viral URIs ). The problem has been waxing and waning. There has been no fever. Associated symptoms include congestion, coughing, headaches, rhinorrhea and a sore throat. Pertinent negatives include no abdominal pain, chest pain, diarrhea, dysuria, ear pain, joint pain, joint swelling, nausea, neck pain, plugged ear sensation, rash, sinus pain, sneezing, swollen glands, vomiting or wheezing. He has tried nothing for the symptoms. The treatment provided no relief.     Review of Systems   Constitutional: Negative for activity change, appetite change, chills, diaphoresis, fatigue, fever and unexpected weight change.   HENT: Positive for congestion, rhinorrhea and sore throat. Negative for ear pain, postnasal drip, sinus pain, sinus pressure, sneezing, tinnitus, trouble swallowing and voice change.    Eyes: Negative for photophobia, pain and visual disturbance.   Respiratory: Positive for cough. Negative for chest tightness, shortness of breath and wheezing.    Cardiovascular: Negative for chest pain, palpitations and leg swelling.   Gastrointestinal: Negative for abdominal distention, abdominal pain, blood in stool, constipation, diarrhea, nausea and vomiting.   Genitourinary: Negative for dysuria and frequency.   Musculoskeletal: Negative for arthralgias, back pain, joint pain, joint swelling, neck pain and neck stiffness.   Skin: Negative for rash.   Neurological: Positive for headaches. Negative for dizziness, tremors, seizures, syncope, facial asymmetry, speech difficulty, weakness, light-headedness and numbness.   Psychiatric/Behavioral: Negative for confusion and sleep disturbance.       Objective:      Physical Exam   Constitutional: He is oriented to person, place, and  time.   HENT:   Right Ear: Tympanic membrane normal.   Left Ear: Tympanic membrane normal.   Nose: Rhinorrhea present. No mucosal edema.   Mouth/Throat: Uvula is midline, oropharynx is clear and moist and mucous membranes are normal.   Neck: Normal range of motion.   Cardiovascular: Normal rate, regular rhythm and normal heart sounds.    Pulmonary/Chest: Effort normal and breath sounds normal.   Musculoskeletal: Normal range of motion.   Neurological: He is alert and oriented to person, place, and time.   Skin: Skin is warm and dry.       Assessment:       1. Chest congestion    2. Viral URI with cough    3. Acute pain of right shoulder        Plan:   Chest congestion  -     guaiFENesin (MUCINEX) 600 mg 12 hr tablet; Take 1 tablet (600 mg total) by mouth 2 (two) times daily.  Dispense: 20 tablet; Refill: 0  -     methylPREDNISolone (MEDROL DOSEPACK) 4 mg tablet; use as directed  Dispense: 1 Package; Refill: 0    Viral URI with cough  -     methylPREDNISolone (MEDROL DOSEPACK) 4 mg tablet; use as directed  Dispense: 1 Package; Refill: 0    Acute pain of right shoulder  -     meloxicam (MOBIC) 7.5 MG tablet; Take 1 tablet (7.5 mg total) by mouth daily as needed for Pain.  Dispense: 14 tablet; Refill: 0      As above  Fluids  Call or return if s/s worsens or fails to improve

## 2018-03-26 ENCOUNTER — TELEPHONE (OUTPATIENT)
Dept: INTERNAL MEDICINE | Facility: CLINIC | Age: 58
End: 2018-03-26

## 2018-03-26 NOTE — TELEPHONE ENCOUNTER
Pt states that he wanted the Robaxin for his back because it helps.   States he has taken it before.

## 2018-03-26 NOTE — TELEPHONE ENCOUNTER
----- Message from Keshia Vaughn MA sent at 3/26/2018  2:33 PM CDT -----  Contact: pt  Not for Diabetes Mgmt    Thanks    ----- Message -----  From: Bridgett Mosley  Sent: 3/26/2018   2:27 PM  To: [facility] Staff    Pt calling to if the doctor call him in a different pain medication, he can be reached at [phone]        [address] [place]  [address] [facility]  [address] [facility]  [place] [address]  Phone: [phone] Fax: [phone]

## 2018-03-26 NOTE — TELEPHONE ENCOUNTER
Pt states the mobic  is not helping and it upsets his stomach.  Requesting Robaxin for his pain.  Please advise.

## 2018-03-27 ENCOUNTER — OFFICE VISIT (OUTPATIENT)
Dept: DIABETES | Facility: CLINIC | Age: 58
End: 2018-03-27
Payer: COMMERCIAL

## 2018-03-27 VITALS
HEIGHT: 69 IN | WEIGHT: 187.81 LBS | BODY MASS INDEX: 27.82 KG/M2 | SYSTOLIC BLOOD PRESSURE: 124 MMHG | DIASTOLIC BLOOD PRESSURE: 78 MMHG

## 2018-03-27 DIAGNOSIS — M25.511 ACUTE PAIN OF RIGHT SHOULDER: Primary | ICD-10-CM

## 2018-03-27 DIAGNOSIS — J40 SINOBRONCHITIS: ICD-10-CM

## 2018-03-27 DIAGNOSIS — E11.65 UNCONTROLLED TYPE 2 DIABETES MELLITUS WITH HYPERGLYCEMIA, WITHOUT LONG-TERM CURRENT USE OF INSULIN: Primary | ICD-10-CM

## 2018-03-27 DIAGNOSIS — J32.9 SINOBRONCHITIS: ICD-10-CM

## 2018-03-27 PROCEDURE — 99214 OFFICE O/P EST MOD 30 MIN: CPT | Mod: SA,S$GLB,, | Performed by: PHYSICIAN ASSISTANT

## 2018-03-27 PROCEDURE — 3044F HG A1C LEVEL LT 7.0%: CPT | Mod: CPTII,S$GLB,, | Performed by: PHYSICIAN ASSISTANT

## 2018-03-27 PROCEDURE — 99999 PR PBB SHADOW E&M-EST. PATIENT-LVL III: CPT | Mod: PBBFAC,,, | Performed by: PHYSICIAN ASSISTANT

## 2018-03-27 RX ORDER — MONTELUKAST SODIUM 10 MG/1
10 TABLET ORAL NIGHTLY
Qty: 30 TABLET | Refills: 0 | Status: SHIPPED | OUTPATIENT
Start: 2018-03-27 | End: 2018-06-22 | Stop reason: SDUPTHER

## 2018-03-27 RX ORDER — PROMETHAZINE HYDROCHLORIDE AND DEXTROMETHORPHAN HYDROBROMIDE 6.25; 15 MG/5ML; MG/5ML
5 SYRUP ORAL EVERY 6 HOURS PRN
Qty: 240 ML | Refills: 0 | Status: SHIPPED | OUTPATIENT
Start: 2018-03-27 | End: 2018-04-06

## 2018-03-27 RX ORDER — METHOCARBAMOL 500 MG/1
500 TABLET, FILM COATED ORAL 2 TIMES DAILY
Qty: 10 TABLET | Refills: 0 | Status: SHIPPED | OUTPATIENT
Start: 2018-03-27 | End: 2018-11-12 | Stop reason: SDUPTHER

## 2018-04-03 NOTE — PROGRESS NOTES
Subjective:      Patient ID: Shelton Young is a 57 y.o. male.    PCP: Primary Doctor No      Shelton Young is a pleasant 57 y.o. male presenting to follow up on diabetes mellitus. He has had diabetes for 2 or more years. His last visit in Diabetes Management was 11/29/2017. Since that time he has had significant improvement in his glycemia. His blood sugar range fasting has been 130 and fed has been 180, and he has been monitoring 1 times per day. His current concerns are glycemic control.    He denies any hospital admissions, emergency room visits, hypoglycemia, syncope, diaphoresis, chest pain, or dyspnea.    He has maintained 187 pounds since last visit. His BMI is 27.74 kg/m².    His blood sugar in the clinic today was:   Lab Results   Component Value Date    POCGLU 120 (A) 11/29/2017     We discussed the American diabetes Association recommendations:  hemoglobin A1c below 7.0%; all diabetics should be on statins unless contraindicated; one aspirin daily unless contraindicated; fasting blood sugar between 80 and 130 mg/dL; postprandial blood sugar below 180 mg/dl; prevention of hypoglycemia, may adjust goals to higher levels if persistent; ACE or ARB therapy if not contraindicated; and maintain in an ideal body weight with BMI below 25.    Shelton is compliant most of the time with DM medications.     Shelton is compliant most of the time with lifestyle modifications to include activity and meal planning.       Current Outpatient Prescriptions:     allopurinol (ZYLOPRIM) 100 MG tablet, Take 1 tablet (100 mg total) by mouth once daily., Disp: 30 tablet, Rfl: 6    ASCORBATE CALCIUM (VITAMIN C ORAL), Take by mouth every other day., Disp: , Rfl:     atorvastatin (LIPITOR) 20 MG tablet, Take 20 mg by mouth every other day., Disp: , Rfl:     CALCIUM CARBONATE/VITAMIN D3 (VITAMIN D-3 ORAL), Take by mouth as needed. , Disp: , Rfl:     empagliflozin (JARDIANCE) 10 mg Tab, Take 10 mg by mouth once daily., Disp:  30 tablet, Rfl: 6    GLUTAMINE ORAL, Take by mouth., Disp: , Rfl:     LACTOBAC NO.41/BIFIDOBACT NO.7 (PROBIOTIC-10 ORAL), Take by mouth., Disp: , Rfl:     losartan (COZAAR) 50 MG tablet, Take 1 tablet (50 mg total) by mouth once daily., Disp: 90 tablet, Rfl: 3    MAGNESIUM ORAL, Take by mouth., Disp: , Rfl:     metformin (GLUCOPHAGE-XR) 500 MG 24 hr tablet, Take 1 tablet (500 mg total) by mouth daily with breakfast., Disp: 90 tablet, Rfl: 3    methocarbamol (ROBAXIN) 500 MG Tab, Take 1 tablet (500 mg total) by mouth 2 (two) times daily., Disp: 10 tablet, Rfl: 0    pantoprazole (PROTONIX) 40 MG tablet, Take 40 mg by mouth as needed. , Disp: , Rfl:     POTASSIUM ORAL, Take by mouth every other day., Disp: , Rfl:     VITAMIN K2 ORAL, Take by mouth., Disp: , Rfl:     montelukast (SINGULAIR) 10 mg tablet, Take 1 tablet (10 mg total) by mouth every evening., Disp: 30 tablet, Rfl: 0    promethazine-dextromethorphan (PROMETHAZINE-DM) 6.25-15 mg/5 mL Syrp, Take 5 mLs by mouth every 6 (six) hours as needed (For Cough)., Disp: 240 mL, Rfl: 0    STANDARDS OF CARE:  Eye doctor: None, last exam Has appointment coming up.  Dental exam: Recommend regular exams; denies gums bleeding.  Podiatry doctor:     ACTIVITY LEVEL: He exercises never since on the job injury of his back.  MEAL PLANNING: Number of meals per day - 3. Number of snacks per day - 2.  Per dietary recall, patient is not limiting carbohydrates, saturated fats and sodium.   BLOOD GLUCOSE TESTING: Self-monitoring with   SOCIAL HISTORY: . Lives with spouse. 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    Lab Results   Component Value Date    WBC 5.33 11/29/2017    HGB 15.0 11/29/2017    HCT 44.8 11/29/2017     11/29/2017    CHOL 173 11/29/2017    TRIG 130 11/29/2017    HDL 34 (L) 11/29/2017    ALT 19 11/29/2017    AST 22 11/29/2017     11/29/2017    K 4.2 11/29/2017     11/29/2017    CREATININE 1.1 11/29/2017    ESTGFRAFRICA >60.0 11/29/2017    EGFRNONAA >60.0 11/29/2017    BUN 16 11/29/2017    CO2 28 11/29/2017    TSH 1.637 11/29/2017     (H) 11/29/2017       Lab Results   Component Value Date    HGBA1C 6.1 (H) 11/29/2017    HGBA1C 6.8 (H) 06/28/2017       Lab Results   Component Value Date    GLUTAMICACID 0.00 06/28/2017    CPEPTIDE 2.75 06/28/2017     Lab Results   Component Value Date    TSH 1.637 11/29/2017     Lab Results   Component Value Date    CALCIUM 9.3 11/29/2017    PHOS 2.9 11/29/2017     Review of patient's allergies indicates:  No Known Allergies    Past Medical History:   Diagnosis Date    Borderline systolic HTN     Diabetes mellitus, type 2 Diagnosed 2015    Prediabetes        Review of Systems   Constitutional: Negative.  Negative for activity change, appetite change, chills, diaphoresis, fatigue, fever and unexpected weight change.   HENT: Negative.  Negative for dental problem, facial swelling, hearing loss, nosebleeds, trouble swallowing and voice change.    Eyes: Negative.  Negative for photophobia, pain, discharge, redness, itching and visual disturbance.   Respiratory: Negative.  Negative for cough, choking, chest tightness, shortness of breath and wheezing.    Cardiovascular: Negative.  Negative for chest pain, palpitations and leg swelling.   Gastrointestinal: Negative.  Negative for abdominal distention, abdominal pain, blood in stool, constipation, diarrhea, nausea and vomiting.   Endocrine: Negative.  Negative for cold intolerance, heat intolerance, polydipsia,  "polyphagia and polyuria.   Genitourinary: Negative.  Negative for decreased urine volume, difficulty urinating, dysuria, frequency, scrotal swelling, testicular pain and urgency.   Musculoskeletal: Negative.  Negative for arthralgias, back pain, gait problem, joint swelling, myalgias, neck pain and neck stiffness.   Skin: Negative.  Negative for color change, pallor, rash and wound.   Allergic/Immunologic: Negative.  Negative for environmental allergies, food allergies and immunocompromised state.   Neurological: Negative.  Negative for dizziness, tremors, seizures, syncope, facial asymmetry, speech difficulty, weakness, light-headedness, numbness and headaches.   Hematological: Negative.  Negative for adenopathy. Does not bruise/bleed easily.   Psychiatric/Behavioral: Negative.  Negative for agitation, behavioral problems, confusion, decreased concentration, dysphoric mood, hallucinations, self-injury, sleep disturbance and suicidal ideas. The patient is not nervous/anxious and is not hyperactive.       Objective:     Vitals - 1 value per visit 11/29/2017 3/14/2018 3/27/2018   SYSTOLIC 130 140 124   DIASTOLIC 80 78 78   PULSE - 70 -   TEMPERATURE - 96.8 -   SPO2 - 98 -   Weight (lb) 195.77 187.61 187.83   Weight (kg) 88.8 85.1 85.2   HEIGHT 5' 9" 5' 9" 5' 9"   BODY MASS INDEX 28.91 27.71 27.74   VISIT REPORT - - -   Pain Score  0 0 4       Physical Exam   Constitutional: He is oriented to person, place, and time. He appears well-developed and well-nourished. He is cooperative.  Non-toxic appearance. He does not have a sickly appearance. He does not appear ill. No distress. He is not intubated.   HENT:   Head: Normocephalic and atraumatic. Not macrocephalic and not microcephalic. Head is without raccoon's eyes, without Boone's sign, without abrasion, without contusion, without laceration, without right periorbital erythema and without left periorbital erythema. Hair is normal.   Right Ear: External ear normal. No " lacerations. No drainage, swelling or tenderness. No foreign bodies. No mastoid tenderness. Tympanic membrane is not injected, not scarred, not perforated, not erythematous, not retracted and not bulging. Tympanic membrane mobility is normal. No middle ear effusion. No hemotympanum. No decreased hearing is noted.   Left Ear: External ear normal. No lacerations. No drainage, swelling or tenderness. No foreign bodies. No mastoid tenderness. Tympanic membrane is not injected, not scarred, not perforated, not erythematous, not retracted and not bulging. Tympanic membrane mobility is normal.  No middle ear effusion. No hemotympanum. No decreased hearing is noted.   Nose: Nose normal.   Mouth/Throat: Oropharynx is clear and moist.   Eyes: EOM and lids are normal. Pupils are equal, round, and reactive to light. Right eye exhibits no chemosis, no discharge, no exudate and no hordeolum. No foreign body present in the right eye. Left eye exhibits no chemosis, no discharge, no exudate and no hordeolum. No foreign body present in the left eye. Right conjunctiva is not injected. Right conjunctiva has no hemorrhage. Left conjunctiva is not injected. Left conjunctiva has no hemorrhage. No scleral icterus. Right eye exhibits normal extraocular motion and no nystagmus. Left eye exhibits normal extraocular motion and no nystagmus. Right pupil is round and reactive. Left pupil is round and reactive. Pupils are equal.   Neck: Normal range of motion, full passive range of motion without pain and phonation normal. Neck supple. Normal carotid pulses, no hepatojugular reflux and no JVD present. No tracheal tenderness, no spinous process tenderness and no muscular tenderness present. Carotid bruit is not present. No neck rigidity. No tracheal deviation, no edema, no erythema and normal range of motion present. No thyroid mass and no thyromegaly present.   Cardiovascular: Normal rate, regular rhythm, normal heart sounds and intact distal  pulses.   No extrasystoles are present. PMI is not displaced.  Exam reveals no gallop, no friction rub and no decreased pulses.    No murmur heard.  Pulses:       Carotid pulses are 2+ on the right side, and 2+ on the left side.       Radial pulses are 2+ on the right side, and 2+ on the left side.        Dorsalis pedis pulses are 2+ on the right side, and 2+ on the left side.        Posterior tibial pulses are 2+ on the right side, and 2+ on the left side.   Pulmonary/Chest: Effort normal and breath sounds normal. No accessory muscle usage or stridor. No apnea, no tachypnea and no bradypnea. He is not intubated. No respiratory distress. He has no decreased breath sounds. He has no wheezes. He has no rhonchi. He has no rales. He exhibits no tenderness.   Abdominal: Soft. Bowel sounds are normal. He exhibits no shifting dullness, no distension, no pulsatile liver, no fluid wave, no abdominal bruit, no ascites, no pulsatile midline mass and no mass. There is no hepatosplenomegaly, splenomegaly or hepatomegaly. There is no tenderness. There is no rigidity, no rebound, no guarding, no CVA tenderness and no tenderness at McBurney's point. No hernia. Hernia confirmed negative in the ventral area.   Musculoskeletal: Normal range of motion. He exhibits no edema or tenderness.        Right foot: There is normal range of motion and no deformity.        Left foot: There is normal range of motion and no deformity.   Feet:   Right Foot:   Protective Sensation: 6 sites tested. 6 sites sensed.   Skin Integrity: Negative for ulcer, blister, skin breakdown, erythema, warmth, callus or dry skin.   Left Foot:   Protective Sensation: 6 sites tested. 6 sites sensed.   Skin Integrity: Negative for ulcer, blister, skin breakdown, erythema, warmth, callus or dry skin.   Lymphadenopathy:        Head (right side): No submental, no submandibular, no tonsillar, no preauricular, no posterior auricular and no occipital adenopathy present.         Head (left side): No submental, no submandibular, no tonsillar, no preauricular, no posterior auricular and no occipital adenopathy present.     He has no cervical adenopathy.        Right cervical: No superficial cervical, no deep cervical and no posterior cervical adenopathy present.       Left cervical: No superficial cervical, no deep cervical and no posterior cervical adenopathy present.   Neurological: He is alert and oriented to person, place, and time. He has normal reflexes. He displays no atrophy and no tremor. No cranial nerve deficit or sensory deficit. He exhibits normal muscle tone. He displays no seizure activity. Coordination and gait normal.   Reflex Scores:       Bicep reflexes are 2+ on the right side and 2+ on the left side.       Brachioradialis reflexes are 2+ on the right side and 2+ on the left side.       Patellar reflexes are 2+ on the right side and 2+ on the left side.  Skin: Skin is warm and dry. No rash noted. No erythema. No pallor.   Psychiatric: He has a normal mood and affect. His mood appears not anxious. His affect is not angry, not blunt, not labile and not inappropriate. His speech is not rapid and/or pressured, not delayed, not tangential and not slurred. He is not agitated, not aggressive, not hyperactive, not slowed, not withdrawn, not actively hallucinating and not combative. Thought content is not paranoid and not delusional. Cognition and memory are not impaired. He does not express impulsivity or inappropriate judgment. He does not exhibit a depressed mood. He expresses no homicidal and no suicidal ideation. He expresses no suicidal plans and no homicidal plans. He is communicative. He exhibits normal recent memory and normal remote memory. He is attentive.     Assessment:     1. Sinobronchitis    2. Uncontrolled type 2 diabetes mellitus with hyperglycemia, without long-term current use of insulin      Plan:     Shelton Young is seen today for   1. Uncontrolled type 2  diabetes mellitus with hyperglycemia, without long-term current use of insulin    2. Sinobronchitis      We have discussed the etiology and treatment options associated with the diagnosis as well as alternatives. He has elected the following treatments.     Uncontrolled type 2 diabetes mellitus with hyperglycemia, without long-term current use of insulin  -     POCT glucose    Sinobronchitis  -     montelukast (SINGULAIR) 10 mg tablet; Take 1 tablet (10 mg total) by mouth every evening.  Dispense: 30 tablet; Refill: 0  -     promethazine-dextromethorphan (PROMETHAZINE-DM) 6.25-15 mg/5 mL Syrp; Take 5 mLs by mouth every 6 (six) hours as needed (For Cough).  Dispense: 240 mL; Refill: 0    1.) Patient was instructed to monitor blood glucose twice daily, fasting, post meal and ac dinner or at bedtime. If on MDI will also need to test pre-meal blood sugar. Reminded to bring BG meter or record to each visit for review.  2.) Reviewed pathophysiology of type 2 diabetes, complications related to the disease, importance of annual dilated eye exam and self daily foot examination.  3.) Continue medications as prescribed Jardiance; Metformin. [facility] MyChart or Phone review in 1 week with BG records for adjustment of medication.  4.) Dietician/CDE evaluation for ongoing meal planning, carbohydrate counting, and diabetic education.  5.) Discussed activity, benefits, methods, and precautions. Recommended patient start/continue some form of exercise and increase as tolerated to 60 minutes per day to facilitate weight loss and aid in control of BGs. Also reminded patient of WHO recommendation of 10,000 steps daily as a goal.   6.) A1C, TSH, Lipid Panel, CMP/Renal panel with eGFR and Micro/Creatinine.  7.) Return to clinic in 12 weeks for follow up. Advised patient to call clinic with any questions or concerns.     A total of 30 minutes was spent in face to face time, of which 50 % was spent in counseling patient on disease process,  complications, treatment, and side effects of medications.    The patient was explained the above plan and given opportunity to ask questions. He understands, chooses and consents to this plan and accepts all the risks, which include but are not limited to the risks mentioned above. He understands the alternative of having no testing, interventions or treatments at this time. He left content and without further questions.     Disclaimer:  This note is prepared using voice recognition software and as such is likely to have errors and has not been proof read. Please contact me for questions.

## 2018-04-19 ENCOUNTER — PATIENT MESSAGE (OUTPATIENT)
Dept: DIABETES | Facility: CLINIC | Age: 58
End: 2018-04-19

## 2018-04-19 RX ORDER — VALACYCLOVIR HYDROCHLORIDE 500 MG/1
500 TABLET, FILM COATED ORAL 3 TIMES DAILY
Qty: 90 TABLET | Refills: 1 | Status: SHIPPED | OUTPATIENT
Start: 2018-04-19 | End: 2018-04-24 | Stop reason: ALTCHOICE

## 2018-04-19 RX ORDER — KETOCONAZOLE 20 MG/G
CREAM TOPICAL DAILY
Qty: 30 G | Refills: 0 | Status: SHIPPED | OUTPATIENT
Start: 2018-04-19 | End: 2018-09-24

## 2018-04-19 NOTE — TELEPHONE ENCOUNTER
Catalina I have sent in the prescription for valacyclovir to his pharmacy. Also I sent in a cream for him to apply to his penis 1-2 times daily. Because, he is on Jardiance and it can cause genitial yeast infections and if it is swollen this may be just a yeast infection from the jardiance.     Manuel

## 2018-04-25 RX ORDER — ACYCLOVIR 400 MG/1
400 TABLET ORAL 3 TIMES DAILY
Qty: 90 TABLET | Refills: 1 | Status: SHIPPED | OUTPATIENT
Start: 2018-04-25 | End: 2018-09-24

## 2018-06-22 DIAGNOSIS — J32.9 SINOBRONCHITIS: ICD-10-CM

## 2018-06-22 DIAGNOSIS — J40 SINOBRONCHITIS: ICD-10-CM

## 2018-06-22 RX ORDER — MONTELUKAST SODIUM 10 MG/1
TABLET ORAL
Qty: 30 TABLET | Refills: 0 | Status: SHIPPED | OUTPATIENT
Start: 2018-06-22 | End: 2019-05-03 | Stop reason: SDUPTHER

## 2018-09-16 RX ORDER — LOSARTAN POTASSIUM 50 MG/1
TABLET ORAL
Qty: 90 TABLET | Refills: 3 | Status: SHIPPED | OUTPATIENT
Start: 2018-09-16 | End: 2019-05-03 | Stop reason: SDUPTHER

## 2018-09-22 ENCOUNTER — NURSE TRIAGE (OUTPATIENT)
Dept: ADMINISTRATIVE | Facility: CLINIC | Age: 58
End: 2018-09-22

## 2018-09-22 NOTE — TELEPHONE ENCOUNTER
Patient called to report the following:     -patient has hx of gout   -gout pain to left ankle, knee, and hip pain  -under a lot of stress  -redness and a little swelling     Education completed per [facility] On Call Care Advice including follow up with provider within 4 hours in ED. Patient verbalized understating.     Reason for Disposition   [1] SEVERE pain (e.g., excruciating, unable to do any normal activities) AND [2] not improved after 2 hours of pain medicine    Protocols used: ST FOOT PAIN-A-AH

## 2018-09-24 ENCOUNTER — LAB VISIT (OUTPATIENT)
Dept: LAB | Facility: HOSPITAL | Age: 58
End: 2018-09-24
Attending: PHYSICIAN ASSISTANT
Payer: COMMERCIAL

## 2018-09-24 ENCOUNTER — OFFICE VISIT (OUTPATIENT)
Dept: INTERNAL MEDICINE | Facility: CLINIC | Age: 58
End: 2018-09-24
Payer: COMMERCIAL

## 2018-09-24 VITALS
TEMPERATURE: 98 F | SYSTOLIC BLOOD PRESSURE: 130 MMHG | WEIGHT: 190.69 LBS | DIASTOLIC BLOOD PRESSURE: 78 MMHG | BODY MASS INDEX: 28.16 KG/M2

## 2018-09-24 DIAGNOSIS — M10.9 ACUTE GOUT OF LEFT FOOT, UNSPECIFIED CAUSE: ICD-10-CM

## 2018-09-24 DIAGNOSIS — M10.9 ACUTE GOUT OF LEFT ANKLE, UNSPECIFIED CAUSE: ICD-10-CM

## 2018-09-24 DIAGNOSIS — M10.9 ACUTE GOUT OF LEFT FOOT, UNSPECIFIED CAUSE: Primary | ICD-10-CM

## 2018-09-24 LAB — URATE SERPL-MCNC: 9.7 MG/DL

## 2018-09-24 PROCEDURE — 36415 COLL VENOUS BLD VENIPUNCTURE: CPT | Mod: PO

## 2018-09-24 PROCEDURE — 99999 PR PBB SHADOW E&M-EST. PATIENT-LVL III: CPT | Mod: PBBFAC,,, | Performed by: PHYSICIAN ASSISTANT

## 2018-09-24 PROCEDURE — 3008F BODY MASS INDEX DOCD: CPT | Mod: CPTII,S$GLB,, | Performed by: PHYSICIAN ASSISTANT

## 2018-09-24 PROCEDURE — 84550 ASSAY OF BLOOD/URIC ACID: CPT

## 2018-09-24 PROCEDURE — 99213 OFFICE O/P EST LOW 20 MIN: CPT | Mod: S$GLB,,, | Performed by: PHYSICIAN ASSISTANT

## 2018-09-24 RX ORDER — INDOMETHACIN 25 MG/1
25 CAPSULE ORAL 3 TIMES DAILY
Qty: 30 CAPSULE | Refills: 1 | Status: SHIPPED | OUTPATIENT
Start: 2018-09-24 | End: 2018-11-16 | Stop reason: SDUPTHER

## 2018-09-24 RX ORDER — PROBENECID AND COLCHICINE .5; 5 MG/1; MG/1
1 TABLET ORAL DAILY
Qty: 20 TABLET | Refills: 1 | Status: SHIPPED | OUTPATIENT
Start: 2018-09-24 | End: 2020-03-19 | Stop reason: ALTCHOICE

## 2018-09-24 NOTE — PATIENT INSTRUCTIONS
Gout Diet  Gout is a painful condition caused by an excess of uric acid, a waste product made by the body. Uric acid forms crystals that collect in the joints. The immune response to these crystals brings on symptoms of joint pain and swelling. This is called a gout attack. Often, medications and diet changes are combined to manage gout. Below are some guidelines for changing your diet to help you manage gout and prevent attacks. Your health care provider will help you determine the best eating plan for you.     Eating to manage gout  Weight loss for those who are overweight may help reduce gout attacks.  Eat less of these foods  Eating too many foods containing purines may raise the levels of uric acid in your body. This raises your risk for a gout attack. Try to limit these foods and drinks:  · Alcohol, such as beer and red wine. You may be told to avoid alcohol completely.  · Soft drinks that contain sugar or high fructose corn syrup  · Certain fish, including anchovies, sardines, fish eggs, and herring  · Shellfish  · Certain meats, such as red meat, hot dogs, luncheon meats, and turkey  · Organ meats, such as liver, kidneys, and sweetbreads  · Legumes, such as dried beans and peas  · Other high fat foods such as gravy, whole milk, and high fat cheeses  · Vegetables such as asparagus, cauliflower, spinach, and mushrooms used to be thought to contribute to an increased risk for a gout attack, but recent studies show that high purine vegetables don't increase the risk for a gout attack.  Eat more of these foods  Other foods may be helpful for people with gout. Add some of these foods to your diet:  · Cherries contain chemicals that may lower uric acid.  · Omega fatty acids. These are found in some fatty fish such as salmon, certain oils (flax, olive, or nut), and nuts themselves. Omega fatty acids may help prevent inflammation due to gout.  · Dairy products that are low-fat or fat-free, such as cheese and  yogurt  · Complex carbohydrate foods, including whole grains, brown rice, oats, and beans  · Coffee, in moderation  · Water, approximately 64 ounces per day  Follow-up care  Follow up with your healthcare provider as advised.  When to seek medical advice  Call your healthcare provider right away if any of these occur:  · Return of gout symptoms, usually at night:  · Severe pain, swelling, and heat in a joint, especially the base of the big toe  · Affected joint is hard to move  · Skin of the affected joint is purple or red  · Fever of 100.4°F (38°C) or higher  · Pain that doesn't get better even with prescribed medicine   Date Last Reviewed: 1/12/2016  © 9282-9438 [employer]. [address] [place] [address], [place] [address]. All rights reserved. This information is not intended as a substitute for professional medical care. Always follow your healthcare professional's instructions.        Eating to Prevent Gout  Gout is a painful form of arthritis caused by an excess of uric acid. This is a waste product made by the body. It builds up in the body and forms crystals that collect in the joints, bringing on a gout attack. Alcohol and certain foods can trigger a gout attack. Below are some guidelines for changing your diet to help you manage gout. Your healthcare provider can work with you to determine the best eating plan for you. Know that diet is only one part of managing gout. Take your medicines as prescribed and follow the other guidelines your healthcare provider has given you.  Foods to limit  Eating too many foods containing purines may increase the levels of uric acid in your body and increase your risk for a gout attack. It may be best to limit these high-purine foods:  · Alcohol (beer, red wine). You may be told to avoid alcohol completely.  · Certain fish (anchovies, sardines, fish roes, herring, tuna, mussels, codfish, scallops, trout, and burt)  · Certain meats (red meat, processed meat, vicente,  turkey, wild game, and goose)  · Sauces and gravies made with meat  · Organ meats (such as liver, kidneys, sweetbreads, and tripe)  · Legumes (such as dried beans, peas)  · Mushrooms, spinach, asparagus, and cauliflower  · Yeast and yeast extract supplements  Foods to try  Some foods may be helpful for people with gout. You may want to try adding some of the following foods to your diet:  · Dark berries: These include blueberries, blackberries, and cherries. These berries contain chemicals that may lower uric acid.  · Tofu: Tofu, which is made from soy, is a good source of protein. Studies have shown that it may be a better choice than meat for people with gout.  · Omega fatty acids: These acids are found in fatty fish (such as salmon), certain oils (such as flax, olive, or nut oils), or nuts. They may help prevent inflammation due to gout.  The following guidelines are recommended by the American Medical Association for people with gout. Your diet should be:  · High in fiber, whole grains, fruits, and vegetables.  · Low in protein (15% of calories should come from protein. Choose lean sources such as soy, lean meats, and poultry).  · Low in fat (no more than 30% of calories should come from fat, with only 10% coming from animal fat).   Date Last Reviewed: 6/17/2015  © 4280-3676 [employer]. [address] [place] [address]. All rights reserved. This information is not intended as a substitute for professional medical care. Always follow your healthcare professional's instructions.        What Is Gout?  Gout is a disease that affects the joints. Left untreated, it can lead to painful foot and joint deformities and even kidney problems. But, by treating gout early, you can relieve pain and help prevent future problems. Gout can usually be treated with medication and proper diet. In severe cases, surgery may be needed.  What causes gout?  Gout is caused by an excess of uric acid (a waste product  "made by the body). Uric acid is excreted by the kidneys. If the uric acid level in your blood rises too high, the uric acid may form crystals that collect in the joints, bringing on a gout attack. If you have many gout attacks, crystals may form large deposits called tophi. Tophi can damage joints and cause deformity.  Who is at risk for gout?  Men are more likely to have gout than women. But women can also be affected, mostly after menopause. Some health problems, such as obesity and high cholesterol, make gout more likely. And some medications, such as diuretics (water pills), can trigger a gout attack. People who drink a lot of alcohol are at high risk for gout. Certain foods can also trigger a gout attack.  Substances that may trigger a gout attack  To help prevent a gout attack, avoid these foods:  · Alcohol (particularly beer, but also red wine and spirits)  · Certain meats (red meat, processed meat, turkey)  · Organ meats (kidney, liver, sweetbread)  · Shellfish (lobster, crab, shrimp, scallop, mussel)  · Certain fish (anchovy, sardine, herring, mackerel)   Treatment  · Lifestyle changes, including weight loss, exercise, and quitting tobacco use  · Reducing consumption of the food groups above as well as high fructose corn syrup, found in many foods including sodas and energy drinks  · Changing non-essential medications that may contribute to gout (such as thiazide diuretics--"water pills")  · Medications to reduce the amount of uric acid in the blood, such as allopurinol or others  · Medications to treat acute gout attacks, including NSAIDs (nonsteroidal anti-inflammatory medicines), steroids, and colchicine  Date Last Reviewed: 2/1/2016  © 6409-7573 [employer]. [address] [place] [address], [place] [address]. All rights reserved. This information is not intended as a substitute for professional medical care. Always follow your healthcare professional's instructions.        Treating Gout Attacks   "   Raising the joint above the level of your heart can help reduce gout symptoms.     Gout is a disease that affects the joints. It is caused by excess uric acid in your blood stream that may lead to crystals forming in your joints. Left untreated, it can lead to painful foot and joint deformities and even kidney problems. But, by treating gout early, you can relieve pain and help prevent future problems. Gout can usually be treated with medication and proper diet. In severe cases, surgery may be needed.  Gout attacks are painful and often happen more than once. Taking medications may reduce pain and prevent attacks in the future. There are also some things you can do at home to relieve symptoms.  Medications for gout  Your healthcare provider may prescribe a daily medication to reduce levels of uric acid. Reducing your uric acid levels may help prevent gout attacks. Allopurinol is one commonly used medication taken daily to reduce uric acid levels. Other medications can help relieve pain and swelling during an acute attack. Medicines such as NSAIDs (nonsteroidal anti-inflammatory medicines), steroids, and colchicine may be prescribed for intermittent use to relieve an acute gout attack. Be sure to take your medication as directed.  What you can do  Below are some things you can do at home to relieve gout symptoms. Your healthcare provider may have other tips.  · Rest the painful joint as much as you can.  · Raise the painful joint so it is at a level higher than your heart.  · Use ice for 10 minutes every 1-2 hours as possible.  How can I prevent gout?  With a little effort, you may be able to prevent gout attacks in the future. Here are some things you can do:  · Avoid foods high in purines  ¨ Certain meats (red meat, processed meat, turkey)  ¨ Organ meats (kidney, liver, sweetbread)  ¨ Shellfish (lobster, crab, shrimp, scallop, mussel)  ¨ Certain fish (anchovy, sardine, herring, mackerel)  · Take any medications  prescribed by your healthcare provider.  · Lose weight if you need to.  · Reduce high fructose corn syrup in meals and drinks.  · Reduce or eliminate consumption of alcohol, particularly beer, but also red wine and spirits.  · Control blood pressure, diabetes, and cholesterol.  · Drink plenty of water to help flush uric acid from your body.  Date Last Reviewed: 2/1/2016  © 7252-8197 [employer]. [address] [place] [address], [place] [address]. All rights reserved. This information is not intended as a substitute for professional medical care. Always follow your healthcare professional's instructions.

## 2018-09-24 NOTE — PROGRESS NOTES
Subjective:       Patient ID: Shelton Young is a 58 y.o.H/ male.    Chief Complaint: Gout    HPI         He comes in today with a 5 day history of having pain in his medial left ankle and swelling and redness.  He is by himself.  He has a history of having had gout 1st in his left great toe, then is ankle, and later his left knee.  He has been on NSAIDs and colchicine in the past.  He says he has been taking his 100 mg Zyloprim every day and he knows to avoid purines in his diet.  He says his diet contains mostly chicken and turkey.    Review of Systems    Otherwise negative concerning the:  ORTHOPEDIC, MUSCULOSKELETAL, or RHEUMATOLOGIC system review.    Objective:      Physical Exam    LEFT ANKLE:  He does have redness and increased heat over the medial malleolus.  The rest of the lower leg and ankle and foot appear normal without much swelling.    Assessment:       1. Acute gout of left ankle, unspecified cause        Plan:     1.  Continue with his Zyloprim daily.    2.  Start him on Indocin 25 mg t.i.d. with food.  Take for 10 days.  3.  Start him on colchicine-probenecid b.i.d. times 10 days.  4.  Info sheets concerning gout and how to treated at home with home physical therapy and dietary changes was given to the patient.  5.  Recheck in 4 days if no significant improvement.  6.  Will get uric acid level done today since we have none in the chart.

## 2018-10-10 ENCOUNTER — PATIENT MESSAGE (OUTPATIENT)
Dept: INTERNAL MEDICINE | Facility: CLINIC | Age: 58
End: 2018-10-10

## 2018-11-08 ENCOUNTER — LAB VISIT (OUTPATIENT)
Dept: LAB | Facility: HOSPITAL | Age: 58
End: 2018-11-08
Attending: FAMILY MEDICINE
Payer: COMMERCIAL

## 2018-11-08 ENCOUNTER — TELEPHONE (OUTPATIENT)
Dept: INTERNAL MEDICINE | Facility: CLINIC | Age: 58
End: 2018-11-08

## 2018-11-08 ENCOUNTER — OFFICE VISIT (OUTPATIENT)
Dept: INTERNAL MEDICINE | Facility: CLINIC | Age: 58
End: 2018-11-08
Payer: COMMERCIAL

## 2018-11-08 VITALS
SYSTOLIC BLOOD PRESSURE: 126 MMHG | DIASTOLIC BLOOD PRESSURE: 82 MMHG | OXYGEN SATURATION: 98 % | HEART RATE: 79 BPM | WEIGHT: 197.75 LBS | TEMPERATURE: 98 F | HEIGHT: 69 IN | BODY MASS INDEX: 29.29 KG/M2

## 2018-11-08 DIAGNOSIS — E78.5 DYSLIPIDEMIA ASSOCIATED WITH TYPE 2 DIABETES MELLITUS: Chronic | ICD-10-CM

## 2018-11-08 DIAGNOSIS — Z11.59 ENCOUNTER FOR HEPATITIS C SCREENING TEST FOR LOW RISK PATIENT: ICD-10-CM

## 2018-11-08 DIAGNOSIS — I10 BENIGN ESSENTIAL HYPERTENSION: Chronic | ICD-10-CM

## 2018-11-08 DIAGNOSIS — M1A.09X1 IDIOPATHIC CHRONIC GOUT OF MULTIPLE SITES WITH TOPHUS: Chronic | ICD-10-CM

## 2018-11-08 DIAGNOSIS — E11.65 UNCONTROLLED TYPE 2 DIABETES MELLITUS WITH HYPERGLYCEMIA, WITHOUT LONG-TERM CURRENT USE OF INSULIN: Primary | Chronic | ICD-10-CM

## 2018-11-08 DIAGNOSIS — E11.69 DYSLIPIDEMIA ASSOCIATED WITH TYPE 2 DIABETES MELLITUS: Chronic | ICD-10-CM

## 2018-11-08 DIAGNOSIS — E11.65 UNCONTROLLED TYPE 2 DIABETES MELLITUS WITH HYPERGLYCEMIA, WITHOUT LONG-TERM CURRENT USE OF INSULIN: Chronic | ICD-10-CM

## 2018-11-08 PROBLEM — M25.511 ACUTE PAIN OF RIGHT SHOULDER: Status: RESOLVED | Noted: 2018-03-14 | Resolved: 2018-11-08

## 2018-11-08 PROBLEM — M10.9 ACUTE GOUT OF LEFT ANKLE: Status: RESOLVED | Noted: 2018-09-24 | Resolved: 2018-11-08

## 2018-11-08 LAB
ALBUMIN SERPL BCP-MCNC: 4.2 G/DL
ALP SERPL-CCNC: 64 U/L
ALT SERPL W/O P-5'-P-CCNC: 30 U/L
ANION GAP SERPL CALC-SCNC: 9 MMOL/L
AST SERPL-CCNC: 35 U/L
BILIRUB SERPL-MCNC: 1.4 MG/DL
BUN SERPL-MCNC: 11 MG/DL
CALCIUM SERPL-MCNC: 9.7 MG/DL
CHLORIDE SERPL-SCNC: 101 MMOL/L
CHOLEST SERPL-MCNC: 190 MG/DL
CHOLEST/HDLC SERPL: 5.6 {RATIO}
CO2 SERPL-SCNC: 29 MMOL/L
CREAT SERPL-MCNC: 1.1 MG/DL
EST. GFR  (AFRICAN AMERICAN): >60 ML/MIN/1.73 M^2
EST. GFR  (NON AFRICAN AMERICAN): >60 ML/MIN/1.73 M^2
ESTIMATED AVG GLUCOSE: 131 MG/DL
GLUCOSE SERPL-MCNC: 111 MG/DL
HBA1C MFR BLD HPLC: 6.2 %
HDLC SERPL-MCNC: 34 MG/DL
HDLC SERPL: 17.9 %
LDLC SERPL CALC-MCNC: 125.4 MG/DL
NONHDLC SERPL-MCNC: 156 MG/DL
POTASSIUM SERPL-SCNC: 4.7 MMOL/L
PROT SERPL-MCNC: 7.6 G/DL
SODIUM SERPL-SCNC: 139 MMOL/L
TRIGL SERPL-MCNC: 153 MG/DL
URATE SERPL-MCNC: 8.6 MG/DL

## 2018-11-08 PROCEDURE — 99999 PR PBB SHADOW E&M-EST. PATIENT-LVL III: CPT | Mod: PBBFAC,,, | Performed by: FAMILY MEDICINE

## 2018-11-08 PROCEDURE — 80061 LIPID PANEL: CPT

## 2018-11-08 PROCEDURE — 83036 HEMOGLOBIN GLYCOSYLATED A1C: CPT

## 2018-11-08 PROCEDURE — 99214 OFFICE O/P EST MOD 30 MIN: CPT | Mod: S$GLB,,, | Performed by: FAMILY MEDICINE

## 2018-11-08 PROCEDURE — 3044F HG A1C LEVEL LT 7.0%: CPT | Mod: CPTII,S$GLB,, | Performed by: FAMILY MEDICINE

## 2018-11-08 PROCEDURE — 36415 COLL VENOUS BLD VENIPUNCTURE: CPT | Mod: PO

## 2018-11-08 PROCEDURE — 3008F BODY MASS INDEX DOCD: CPT | Mod: CPTII,S$GLB,, | Performed by: FAMILY MEDICINE

## 2018-11-08 PROCEDURE — 86803 HEPATITIS C AB TEST: CPT

## 2018-11-08 PROCEDURE — 80053 COMPREHEN METABOLIC PANEL: CPT

## 2018-11-08 PROCEDURE — 84550 ASSAY OF BLOOD/URIC ACID: CPT

## 2018-11-08 NOTE — PROGRESS NOTES
CHIEF COMPLAINT  Follow-up      HISTORY OF PRESENT ILLNESS    This is my first time treating him here. All problems addressed today are NEW TO ME.     PROBLEM/CONDITION: He is overdue for follow-up lab monitoring with regard to his type II diabetes mellitus Previous blood sugars were elevated, and most recent hemoglobin A1c was within therapeutic goal range, but he has since stopped taking his metformin and he is not performing home glycemic monitoring.    PROBLEM/CONDITION: Dyslipidemia, predominantly HDL-cholesterol deficiency, noted on previous labs. He said he did not tolerate a Torah statin due to side effects of fatigue. It was agreed that he would obtain labs ordered and return within the next week or 2 to discuss other treatment options.    PROBLEM/CONDITION: Your porch chronic recurrent gout, originating with acute gout in his great toe, but occasionally having gout in his ankles. He does not have acute gout at present.    PROBLEM/CONDITION: Hypertension appears well-controlled. No angina or angina equivalent symptoms reported.    NARRATIVE HISTORY: He brought FMLA papers from his work requesting that I complete them with regards to his gout. I completed the FMLA forms and gave them to him for him to return to his employer as he sees fit.    Problem List Items Addressed This Visit        Cardiac/Vascular    Benign essential hypertension (Chronic)    Relevant Orders    Comprehensive metabolic panel    Dyslipidemia associated with type 2 diabetes mellitus (Chronic)    Relevant Orders    Comprehensive metabolic panel    Lipid panel       Endocrine    Uncontrolled type 2 diabetes mellitus with hyperglycemia, without long-term current use of insulin - Primary (Chronic)    Current Assessment & Plan     DIABETIC FOOT EXAM: Inspection of feet reveals no open wounds, ulcerations, significant calluses, or evidence of arterial insufficiency. 10g monofilament sensation is intact in all 5 locations tested.           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    REVIEW OF SYSTEMS  CONSTITUTIONAL: No fever reported.  CARDIOVASCULAR: No chest pain reported.  PULMONARY: No trouble breathing reported.   ENDOCRINE: No polyuria or polydipsia reported. No symptomatic or otherwise significant hypoglycemia or hyperglycemia reported.     PHYSICAL EXAM  Vitals:    11/08/18 1038   BP: 126/82   BP Location: Right arm   Patient Position: Sitting   BP Method: Large (Manual)   Pulse: 79   Temp: 98.1 °F (36.7 °C)   TempSrc: Tympanic   SpO2: 98%   Weight: 89.7 kg (197 lb 12 oz)   Height: 5' 9" (1.753 m)     CONSTITUTIONAL: Vital signs noted. No apparent distress. Does not appear acutely ill or septic. Appears adequately hydrated.  EYE: Sclerae anicteric. Lids and conjunctiva unremarkable.  ENT: External ENT unremarkable. Oropharynx moist.  NECK: Trachea midline. Thyroid nontender.  PULM: Lungs clear. Breathing unlabored.  CV: Auscultation reveals regular rate and rhythm without murmur, gallop or rub. No carotid bruit.  GI: Soft and nontender. Bowel sounds normal.  DERM: Skin warm and moist with normal turgor.  NEURO: There are no gross focal motor deficits or gross deficits of cranial nerves III-XII.  PSYCH: Alert and oriented x 3. Mood is grossly neutral. Affect appropriate. Judgment and insight not grossly compromised.  MSK: Grossly normal stance and gait.   DIABETIC FOOT EXAM: Inspection of feet " reveals no open wounds, ulcerations, significant calluses, or evidence of arterial insufficiency. 10g monofilament sensation is intact in all 5 locations tested.     ASSESSMENT and PLAN  Uncontrolled type 2 diabetes mellitus with hyperglycemia, without long-term current use of insulin  -     Hemoglobin A1c; Future; Expected date: 11/08/2018  -     Comprehensive metabolic panel; Future; Expected date: 11/08/2018  -      DIABETES FOOT EXAM  -     Microalbumin/creatinine urine ratio; Future; Expected date: 11/08/2018    Benign essential hypertension  -     Comprehensive metabolic panel; Future; Expected date: 11/08/2018    Dyslipidemia associated with type 2 diabetes mellitus  -     Comprehensive metabolic panel; Future; Expected date: 11/08/2018  -     Lipid panel; Future; Expected date: 11/08/2018    Idiopathic chronic gout of multiple sites with tophus  -     Uric acid; Future; Expected date: 11/08/2018    Encounter for hepatitis C screening test for low risk patient  -     Hepatitis C antibody; Future; Expected date: 11/08/2018        PRESCRIPTION MEDICATION MANAGEMENT     Medications Discontinued During This Encounter   Medication Reason    atorvastatin (LIPITOR) 20 MG tablet Patient no longer taking    metformin (GLUCOPHAGE-XR) 500 MG 24 hr tablet Patient no longer taking    POTASSIUM ORAL Patient no longer taking    VITAMIN K2 ORAL Patient no longer taking    MAGNESIUM ORAL Patient no longer taking    GLUTAMINE ORAL Patient no longer taking    empagliflozin (JARDIANCE) 10 mg Tab Patient no longer taking    CALCIUM CARBONATE/VITAMIN D3 (VITAMIN D-3 ORAL) Patient no longer taking       Follow-up in about 1 week (around 11/15/2018) for review test results and discuss treatment plan, re-evaluate problem(s) discussed today.    There are no Patient Instructions on file for this visit.    ABOUT THIS DOCUMENTATION:  · The order of the conditions listed in the HPI is one of convenience and does not necessarily  "reflect the chronology of the appointment, nor the relative importance of a condition.  · Documentation entered by me for this encounter was done in part using speech-recognition technology. Although I have made an effort to ensure accuracy, "sound like" errors may exist and should be interpreted in context.                        -PERRI Galvan MD     "

## 2018-11-08 NOTE — ASSESSMENT & PLAN NOTE
DIABETIC FOOT EXAM: Inspection of feet reveals no open wounds, ulcerations, significant calluses, or evidence of arterial insufficiency. 10g monofilament sensation is intact in all 5 locations tested.

## 2018-11-08 NOTE — TELEPHONE ENCOUNTER
----- Message from Linnette George sent at 11/8/2018  9:11 AM CST -----  Contact: pt  Pt request Dr maldonado 4 occurences a month on [facility] paperwork   ...[phone] (home)

## 2018-11-09 LAB — HCV AB SERPL QL IA: NEGATIVE

## 2018-11-10 DIAGNOSIS — E80.6 HYPERBILIRUBINEMIA: ICD-10-CM

## 2018-11-10 DIAGNOSIS — E11.69 DYSLIPIDEMIA ASSOCIATED WITH TYPE 2 DIABETES MELLITUS: Chronic | ICD-10-CM

## 2018-11-10 DIAGNOSIS — E78.5 DYSLIPIDEMIA ASSOCIATED WITH TYPE 2 DIABETES MELLITUS: Chronic | ICD-10-CM

## 2018-11-10 RX ORDER — ATORVASTATIN CALCIUM 20 MG/1
20 TABLET, FILM COATED ORAL NIGHTLY
Qty: 90 TABLET | Refills: 3 | Status: SHIPPED | OUTPATIENT
Start: 2018-11-10 | End: 2019-08-08 | Stop reason: DRUGHIGH

## 2018-11-10 NOTE — PROGRESS NOTES
"TASK: Please read Patient Portal Message (below), and then contact him to verify his receipt and understanding. (Most important points marked with +++  +++.) Thanks.  --------------------------------------------------------------------------------      Shelton Gamble.    I have reviewed the results of recent tests you had done.    Your lipid (cholesterol) panel shows that your cholesterol levels are BAD. High cholesterol levels can cause atherosclerosis or "hardening of the arteries." This significantly increases your risk of having a heart attack or stroke. Fortunately, some effective treatment options are available.    The first-line treatment is a heart-healthy lifestyle, which includes a healthy diet, weight loss (if you are overweight or obese), and exercise. You can learn more about a heart-healthy lifestyle at the website of the American Heart Association, www.americanheart.org.    For many people, a heart-healthy lifestyle is not enough by itself. Good medications are available that can lower cholesterol levels, but I don't call them "cholesterol-lowering medicines." I call them "heart attack and stroke prevention medicines," because the reason we take them is to lower your risk heart attack and stroke.    The decision to start a medicine to lower cholesterol is made on a case-by-case basis, taking into consideration your risk for developing heart disease. Calculators are available that can estimate this risk based on your age, sex, medical history, and other characteristics. +++Most doctors recommend treatment at a particular level of risk, such as a 7.5 or 10 percent risk of developing CVD over 10 years.+++    +++YOUR CVD RISK IS 16.8%+++ (Values used to calculate the score: Age = 58 years; Sex = Male; Is Non-  = No; Diabetic = Yes; Tobacco smoker = No; Systolic Blood Pressure = 126 mmHg; Is BP treated = Yes; HDL Cholesterol = 34 mg/dL; Total Cholesterol = 190 mg/dL).    The most " "commonly used medicines to lower cholesterol are in the category called  "statins." Statins are among the most powerful drugs for lowering LDL-C and are the most effective drugs for prevention of coronary heart disease, heart attack, stroke, and death. Available statins include atorvastatin (brand name: Lipitor) and several other similar medications. Statins decrease the body's production of cholesterol and can reduce LDL cholesterol ("bad" cholesterol) levels by as much as 20 to 60 percent. Also, they can lower triglycerides. Statins may also prevent heart attacks and strokes in other ways.    +++I recommend that you start taking a statin to lower your risk for heart attack and stroke. I'm giving you a prescription for atorvastatin (brand name: Lipitor)+++ because it has a good track record of being effective and safe. Atorvastatin is available generic, so it should not be that expensive. (It is available at many pharmacies for less than $15 a month WITHOUT insurance. If you have insurance, it may be even less. You can price-shop online at www.[employer].)    Because of the body's 24-hour cycle of metabolism, statins work best when taken close to bedtime, so they need to be taken in the evening if possible.    Most people tolerate statins well. Some people experience side effects - most commonly muscle aches or weakness. I trust that you will let me know if you experience any side effects, and we will discuss how to deal with them.    If you OK with this plan to start you on a "heart attack and stroke prevention medicine," then go by your pharmacy to  the prescription for atorvastatin and take it as directed. We will re-check your cholesterol levels and metabolic lab in 1 year.    If you have any questions or reservations, please let me know ASAP, because the sooner we start your treatment, the sooner we can reverse, stop or at least slow down the process of atherosclerosis (hardening of the " "arteries).    When you return for your next appointment, we can discuss additional ways to lower your risk for heart disease and stroke.    The comprehensive metabolic panel (CMP) checks kidney function, liver function, sodium, potassium, glucose (sugar) and other aspects of your metabolism. Your CMP is NORMAL EXCEPT that +++your BILIRUBIN is ELEVATED.+++ Bilirubin is a pigment used by the liver in the production of bile. Bilirubin can be elevated by a number of conditions. The most common cause of elevated bilirubin in an otherwise healthy person not having any symptoms is a genetic condition that rarely causes any long-term problems. +++I have ordered for you an additional lab test done to look for other conditions that could cause your bilirubin to be elevated. Please get that lab test done at your earliest convenience.+++    Uric acid is the chemical that causes gout. Your uric acid level is HIGH.+++ If you are having gout attacks very often, please schedule an appointment with me so we can discuss more aggressive treatments to lowering your uric acid level.+++ AVOIDING foods that contain a lot of purine can lower uric acid levels and help control gout. Foods with HIGH purine content include: (1) alcoholic beverages (all types); (2) some fish, seafood and shellfish, including anchovies, sardines, herring, mussels, codfish, scallops, trout and burt; and (3) some meats, such as vicente, turkey, veal, venison and organ meats like liver. Foods with MODERATELY HIGH purine content include: (1) meats, such as beef, chicken, duck, pork and ham; and (2) shellfish, such as crab, lobster, oysters and shrimp.    Your hemoglobin A1c ("A1c" - a test used to evaluate for diabetes) is 6.2%, which means that your diabetes is well-controlled.    Your urine microalbumin/creatinine ratio (a test for early kidney damage from diabetes or high blood pressure) is NORMAL.    Your screening test for hepatitis C is NEGATIVE.    If you " "have any questions about your test results, write them down and bring them with you to your next appointment. You can call or message me in the meantime if you have urgent questions.    Thank you for letting me care for you. I look forward to seeing you at your next appointment.    Sincerely,    PERRI Galvan MD    P.S. - Want to learn more about your test results and what they mean? It's as simple as 1, 2, 3.     (1) Log in to your [email or web address] account at [email or web address]     (2) From the "View test results" tab, click on the test you want to know more about.     (3) Click on the "About This Test" link."

## 2018-11-12 DIAGNOSIS — M25.511 ACUTE PAIN OF RIGHT SHOULDER: ICD-10-CM

## 2018-11-12 RX ORDER — METHOCARBAMOL 500 MG/1
500 TABLET, FILM COATED ORAL 2 TIMES DAILY PRN
Qty: 10 TABLET | Refills: 0 | Status: SHIPPED | OUTPATIENT
Start: 2018-11-12 | End: 2020-03-26

## 2018-11-13 ENCOUNTER — TELEPHONE (OUTPATIENT)
Dept: INTERNAL MEDICINE | Facility: CLINIC | Age: 58
End: 2018-11-13

## 2018-11-13 NOTE — TELEPHONE ENCOUNTER
Spoke with patient. Informed patient that provider stated that the [facility] paperwork that is in question was given to him during last visit. Patient looked through last visit summary and acknowledge that he had the paperwork in his possession.//pedro

## 2018-11-13 NOTE — TELEPHONE ENCOUNTER
----- Message from Monty Ferguson sent at 11/13/2018  3:24 PM CST -----  Contact: pt   Pt is returning the nurse call.  [phone] (home)

## 2018-11-13 NOTE — TELEPHONE ENCOUNTER
----- Message from Esther Slaughter sent at 11/13/2018  1:01 PM CST -----  Contact: self  Requesting call back regarding.please call back at [phone].      Thanks,.  Esther Slaughter

## 2018-11-15 ENCOUNTER — LAB VISIT (OUTPATIENT)
Dept: LAB | Facility: HOSPITAL | Age: 58
End: 2018-11-15
Attending: FAMILY MEDICINE
Payer: COMMERCIAL

## 2018-11-15 DIAGNOSIS — E80.6 HYPERBILIRUBINEMIA: ICD-10-CM

## 2018-11-15 LAB
ALBUMIN SERPL BCP-MCNC: 4.3 G/DL
ALP SERPL-CCNC: 62 U/L
ALT SERPL W/O P-5'-P-CCNC: 23 U/L
AST SERPL-CCNC: 24 U/L
BILIRUB DIRECT SERPL-MCNC: 0.5 MG/DL
BILIRUB SERPL-MCNC: 1.4 MG/DL
PROT SERPL-MCNC: 7.6 G/DL

## 2018-11-15 PROCEDURE — 80076 HEPATIC FUNCTION PANEL: CPT

## 2018-11-15 PROCEDURE — 36415 COLL VENOUS BLD VENIPUNCTURE: CPT | Mod: PO

## 2018-11-16 ENCOUNTER — OFFICE VISIT (OUTPATIENT)
Dept: INTERNAL MEDICINE | Facility: CLINIC | Age: 58
End: 2018-11-16
Payer: COMMERCIAL

## 2018-11-16 ENCOUNTER — PATIENT MESSAGE (OUTPATIENT)
Dept: INTERNAL MEDICINE | Facility: CLINIC | Age: 58
End: 2018-11-16

## 2018-11-16 VITALS
OXYGEN SATURATION: 96 % | SYSTOLIC BLOOD PRESSURE: 130 MMHG | BODY MASS INDEX: 29.22 KG/M2 | DIASTOLIC BLOOD PRESSURE: 84 MMHG | HEIGHT: 69 IN | WEIGHT: 197.31 LBS | TEMPERATURE: 98 F | HEART RATE: 81 BPM

## 2018-11-16 DIAGNOSIS — E11.9 TYPE 2 DIABETES MELLITUS WITHOUT COMPLICATION, WITHOUT LONG-TERM CURRENT USE OF INSULIN: Chronic | ICD-10-CM

## 2018-11-16 DIAGNOSIS — E11.69 DYSLIPIDEMIA ASSOCIATED WITH TYPE 2 DIABETES MELLITUS: Chronic | ICD-10-CM

## 2018-11-16 DIAGNOSIS — K21.9 GASTROESOPHAGEAL REFLUX DISEASE WITHOUT ESOPHAGITIS: Chronic | ICD-10-CM

## 2018-11-16 DIAGNOSIS — M1A.09X1 IDIOPATHIC CHRONIC GOUT OF MULTIPLE SITES WITH TOPHUS: Primary | Chronic | ICD-10-CM

## 2018-11-16 DIAGNOSIS — I10 BENIGN ESSENTIAL HYPERTENSION: Chronic | ICD-10-CM

## 2018-11-16 DIAGNOSIS — E78.5 DYSLIPIDEMIA ASSOCIATED WITH TYPE 2 DIABETES MELLITUS: Chronic | ICD-10-CM

## 2018-11-16 DIAGNOSIS — E80.6 HYPERBILIRUBINEMIA: ICD-10-CM

## 2018-11-16 PROCEDURE — 3008F BODY MASS INDEX DOCD: CPT | Mod: CPTII,S$GLB,, | Performed by: FAMILY MEDICINE

## 2018-11-16 PROCEDURE — 99999 PR PBB SHADOW E&M-EST. PATIENT-LVL IV: CPT | Mod: PBBFAC,,, | Performed by: FAMILY MEDICINE

## 2018-11-16 PROCEDURE — 3044F HG A1C LEVEL LT 7.0%: CPT | Mod: CPTII,S$GLB,, | Performed by: FAMILY MEDICINE

## 2018-11-16 PROCEDURE — 99214 OFFICE O/P EST MOD 30 MIN: CPT | Mod: S$GLB,,, | Performed by: FAMILY MEDICINE

## 2018-11-16 RX ORDER — ALLOPURINOL 300 MG/1
300 TABLET ORAL DAILY
Qty: 90 TABLET | Refills: 3 | Status: SHIPPED | OUTPATIENT
Start: 2018-11-16 | End: 2019-11-16

## 2018-11-16 RX ORDER — INDOMETHACIN 25 MG/1
25 CAPSULE ORAL 3 TIMES DAILY PRN
Qty: 30 CAPSULE | Refills: 1 | Status: SHIPPED | OUTPATIENT
Start: 2018-11-16 | End: 2019-02-12 | Stop reason: SDUPTHER

## 2018-11-16 NOTE — PROGRESS NOTES
CHIEF COMPLAINT: re-evaluate chronic condition(s)      --------------------------------  HISTORY OF PRESENT ILLNESS  --------------------------------  PROBLEM/CONDITION: He reports that his gout is significantly improved. I reviewed his recent labs, including elevated uric acid, improved from previous measurement on allopurinol 100 mg daily. He says he last needed indomethacin about a week ago. We discussed risks and benefits of treatment options. In addition to therapeutic lifestyle changes, we are going to increase his allopurinol to 300 mg daily.     PROBLEM/CONDITION: His type II diabetes mellitus is diet controlled based on hemoglobin A1c.     PROBLEM/CONDITION: He reports typical GERD symptoms, with reported history of prior benign upper endoscopy. He says he needs/uses proton pump inhibitor infrequently, typically no more than a couple of times per month. I instructed him to transition to over-the-counter Prilosec, and he will let me know if this fails to provide satisfactory symptom relief.     PROBLEM/CONDITION: He reports he is tolerating a tour statin will for his dyslipidemia.     PROBLEM/CONDITION: Direct hyperbilirubinemia noted on labs. We discussed differential diagnosis. He reports no hepatic specific symptoms. It was agreed to evaluate with liver ultrasound and repeat labs in six months.     PROBLEM/CONDITION: Hypertension appears well-controlled. No angina or angina equivalent symptoms reported.       --------------------------------  REVIEW OF SYSTEMS  --------------------------------  CONSTITUTIONAL: No fever reported.  CARDIOVASCULAR: No angina reported.  ENDOCRINE: No polydipsia reported.  HEMATOLOGIC: No bleeding problems reported.      --------------------------------  PHYSICAL EXAM  --------------------------------  CONSTITUTIONAL: Vital signs noted. No apparent distress. Does not appear acutely ill or septic. Appears adequately hydrated.  PULM: Breathing unlabored.  HEART: Regular.  DERM:  Skin normothermic with normal turgor.  NEURO: There are no gross focal motor deficits or gross deficits of cranial nerves III-XII.  PSYCHIATRIC: Alert and oriented x 3. Mood is grossly neutral. Affect appropriate. Judgment and insight not grossly compromised.        CHIEF COMPLAINT as recorded by staff: Follow-up      Problem List Items Addressed This Visit        Cardiac/Vascular    Benign essential hypertension (Chronic)    Dyslipidemia associated with type 2 diabetes mellitus (Chronic)    Overview     Lab Results   Component Value Date    CHOL 190 11/08/2018    CHOL 173 11/29/2017    CHOL 179 06/28/2017    TRIG 153 (H) 11/08/2018    TRIG 130 11/29/2017    TRIG 151 (H) 06/28/2017    HDL 34 (L) 11/08/2018    HDL 34 (L) 11/29/2017    HDL 29 (L) 06/28/2017    LDLCALC 125.4 11/08/2018    LDLCALC 113.0 11/29/2017    LDLCALC 119.8 06/28/2017    NONHDLCHOL 156 11/08/2018    NONHDLCHOL 139 11/29/2017    NONHDLCHOL 150 06/28/2017    AST 35 11/08/2018    ALT 30 11/08/2018     Wt Readings from Last 2 Encounters:   11/08/18 89.7 kg (197 lb 12 oz)   09/24/18 86.5 kg (190 lb 11.2 oz)     BMI Readings from Last 2 Encounters:   11/08/18 29.20 kg/m²   09/24/18 28.16 kg/m²     The 10-year ASCVD risk score (Gila BABCOCK Jr., et al., 2013) is: 16.8%    Values used to calculate the score:      Age: 58 years      Sex: Male      Is Non- : No      Diabetic: Yes      Tobacco smoker: No      Systolic Blood Pressure: 126 mmHg      Is BP treated: Yes      HDL Cholesterol: 34 mg/dL      Total Cholesterol: 190 mg/dL          Relevant Orders    Hepatic function panel       Endocrine    Type 2 diabetes mellitus without complication, without long-term current use of insulin (Chronic)       GI    Gastroesophageal reflux disease without esophagitis (Chronic)       Orthopedic    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    There are no Patient Instructions on file for this visit.    ABOUT THIS DOCUMENTATION:  · The order of the conditions listed in the HPI is one of convenience and does not necessarily reflect the chronology of the appointment, nor the relative importance of a condition.  · Documentation entered by me for this encounter was done in part using speech-recognition technology. Although I have made an effort to ensure accuracy, "sound like" errors may exist and should be interpreted in context.                        -PERRI Galvan MD      "

## 2018-11-20 ENCOUNTER — PATIENT MESSAGE (OUTPATIENT)
Dept: INTERNAL MEDICINE | Facility: CLINIC | Age: 58
End: 2018-11-20

## 2018-11-27 ENCOUNTER — TELEPHONE (OUTPATIENT)
Dept: INTERNAL MEDICINE | Facility: CLINIC | Age: 58
End: 2018-11-27

## 2018-11-27 ENCOUNTER — PATIENT MESSAGE (OUTPATIENT)
Dept: INTERNAL MEDICINE | Facility: CLINIC | Age: 58
End: 2018-11-27

## 2018-11-27 NOTE — TELEPHONE ENCOUNTER
----- Message from Yomaira Qureshi sent at 11/27/2018  9:13 AM CST -----  Contact: pt  He is calling in regards to his [facility] paperwork not being completed and the deadline is in two days for his employer,please advise [phone] (home) and if no answer please call and leave a voicemail on his home phone [phone]

## 2018-11-27 NOTE — TELEPHONE ENCOUNTER
Spoke with patient advised I have not received his paperwork that he discussed on date he was seen, he states he will fax it over, fax number given to him

## 2018-11-28 ENCOUNTER — TELEPHONE (OUTPATIENT)
Dept: INTERNAL MEDICINE | Facility: CLINIC | Age: 58
End: 2018-11-28

## 2018-11-28 NOTE — TELEPHONE ENCOUNTER
----- Message from Pooja Connelly sent at 11/28/2018  1:18 PM CST -----  Contact: self  Requesting call back regarding status of FMLA paperwork. Pt wants to know if paperwork has been received and faxed over. Please call back at [phone].      Thanks,  Pooja Connelly

## 2018-11-28 NOTE — TELEPHONE ENCOUNTER
Spoke with patient. Informed patient that its unsure that fax was received from workers comp. Patient stated that paperwork needs to be intermittent with 4 occurrences a month being allowed if needed. Also states paperwork will need to be dated 5/1/2018-5/1/2019.//ddw

## 2018-11-29 ENCOUNTER — PATIENT MESSAGE (OUTPATIENT)
Dept: INTERNAL MEDICINE | Facility: CLINIC | Age: 58
End: 2018-11-29

## 2018-11-29 NOTE — TELEPHONE ENCOUNTER
Spoke with patient on the phone. He also hand-delivered paperwork to me in [employer]. Informed him I would pass paperwork on to Dr. Galvan for review and also fax to company this evening. Further informed patient I would call him to confirm this was done. Patient verbalized understanding, thanked me, and ended call.

## 2018-11-29 NOTE — TELEPHONE ENCOUNTER
----- Message from Amy Butler sent at 11/29/2018 10:27 AM CST -----  Contact: self-[phone]  Would like to consult with  nurse.if paperwork ready to be . Are was it fax over. Please call back at  [phone]. Thanks janis

## 2018-11-29 NOTE — TELEPHONE ENCOUNTER
----- Message from Frances Curtis sent at 11/29/2018  1:58 PM CST -----  Contact: patient  Calling concerning the status of his paperwork. Please call patient @ [phone]. thanks,kavita

## 2018-11-30 ENCOUNTER — OFFICE VISIT (OUTPATIENT)
Dept: INTERNAL MEDICINE | Facility: CLINIC | Age: 58
End: 2018-11-30
Payer: COMMERCIAL

## 2018-11-30 VITALS
DIASTOLIC BLOOD PRESSURE: 74 MMHG | HEART RATE: 89 BPM | HEIGHT: 70 IN | SYSTOLIC BLOOD PRESSURE: 132 MMHG | TEMPERATURE: 98 F | OXYGEN SATURATION: 96 % | WEIGHT: 204.13 LBS | BODY MASS INDEX: 29.22 KG/M2

## 2018-11-30 DIAGNOSIS — E11.9 TYPE 2 DIABETES MELLITUS WITHOUT COMPLICATION, WITHOUT LONG-TERM CURRENT USE OF INSULIN: Chronic | ICD-10-CM

## 2018-11-30 DIAGNOSIS — E80.6 HYPERBILIRUBINEMIA: ICD-10-CM

## 2018-11-30 DIAGNOSIS — F41.9 ANXIETY: Chronic | ICD-10-CM

## 2018-11-30 DIAGNOSIS — M10.062 ACUTE IDIOPATHIC GOUT OF LEFT KNEE: Primary | ICD-10-CM

## 2018-11-30 DIAGNOSIS — I10 BENIGN ESSENTIAL HYPERTENSION: Chronic | ICD-10-CM

## 2018-11-30 PROCEDURE — 3044F HG A1C LEVEL LT 7.0%: CPT | Mod: CPTII,S$GLB,, | Performed by: FAMILY MEDICINE

## 2018-11-30 PROCEDURE — 99214 OFFICE O/P EST MOD 30 MIN: CPT | Mod: S$GLB,,, | Performed by: FAMILY MEDICINE

## 2018-11-30 PROCEDURE — 99999 PR PBB SHADOW E&M-EST. PATIENT-LVL IV: CPT | Mod: PBBFAC,,, | Performed by: FAMILY MEDICINE

## 2018-11-30 PROCEDURE — 3008F BODY MASS INDEX DOCD: CPT | Mod: CPTII,S$GLB,, | Performed by: FAMILY MEDICINE

## 2018-11-30 RX ORDER — PROBENECID AND COLCHICINE .5; 5 MG/1; MG/1
1 TABLET ORAL DAILY
Qty: 20 TABLET | Refills: 1 | OUTPATIENT
Start: 2018-11-30

## 2018-11-30 RX ORDER — ALPRAZOLAM 0.5 MG/1
.25-.5 TABLET ORAL 2 TIMES DAILY PRN
Qty: 30 TABLET | Refills: 2 | Status: SHIPPED | OUTPATIENT
Start: 2018-11-30 | End: 2019-02-12 | Stop reason: SDUPTHER

## 2018-11-30 RX ORDER — PREDNISONE 20 MG/1
60 TABLET ORAL DAILY
Qty: 6 TABLET | Refills: 0 | Status: SHIPPED | OUTPATIENT
Start: 2018-11-30 | End: 2018-12-02

## 2018-11-30 NOTE — PROGRESS NOTES
CHIEF COMPLAINT: Gout      --------------------------------  HISTORY OF PRESENT ILLNESS  --------------------------------  PROBLEM/CONDITION: NEW PROBLEM is acute gout attack. LOCATION is left knee. QUALITY described as painful inflammation. ONSET reported as over the last day or two. SEVERITY of symptoms described as MODERATELY SEVERE. EXACERBATING FACTORS include ambulation and range of motion of joint. Symptoms occur in the CONTEXT of underlying chronic recurrent gout of multiple sites, and he reports that his current symptoms are consistent with previous gout attacks. He has prescriptions for indomethacin and colchicine, and he has tried a dose of indomethacin but has not used the colchicine. He says that corticosteroids have worked well in the past. We discussed treatment options, and it was agreed to give him a short course of systemic corticosteroids, and I told him he could augment this with the colchicine already prescribed.     PROBLEM/CONDITION: Type 2 diabetes mellitus appears well-controlled. No symptomatic hypoglycemia or hyperglycemia reported.     PROBLEM/CONDITION: Hypertension appears well-controlled. No angina or angina equivalent symptoms reported.     PROBLEM/CONDITION: Recent labs showed elevated bilirubin, undetermined etiology. I have already ordered for him to have liver ultrasound and hepatic function panel labs. He says he will get these done soon. He reports no hepatic specific symptoms, and demonstrates no jaundice.     PROBLEM/CONDITION: Worsening problem is anxiety, EXACERBATED by stressors relating to his divorce and excess work hours. He describes MODERATE to MODERATELY SEVERE anxiety symptoms, but only MILD depression symptoms. EXACERBATING FACTORS are circumstantial and he anticipates things improving after the holidays. We discussed treatment options. He has no history of chemical dependency. It was agreed to begin a SHORT TERM treatment with alprazolam AS NEEDED. I encouraged him  to use the lowest effective dose. He understands that he will need to return for reevaluation prior to receiving any additional refills.       --------------------------------  REVIEW OF SYSTEMS  --------------------------------  CONSTITUTIONAL: No fever reported.  CARDIOVASCULAR: No angina reported.  PSYCHIATRIC: No mood instability reported.  NEUROLOGIC: No seizures reported.  ENDOCRINE: No polydipsia reported.      --------------------------------  PHYSICAL EXAM  --------------------------------  CONSTITUTIONAL: Vital signs noted. No apparent distress. Does not appear acutely ill or septic. Appears adequately hydrated.  HEENT: External ENT grossly unremarkable. Hearing grossly intact. Oropharynx moist.  PULM: Lungs clear. Breathing unlabored.  HEART: Auscultation reveals regular rate and rhythm.  DERM: Skin warm and moist with normal turgor.  NEURO: There are no gross focal motor deficits or gross deficits of cranial nerves III-XII.  PSYCHIATRIC: Alert and oriented x 3. Mood is grossly neutral. Affect appropriate. Judgment and insight not grossly compromised.  MUSCULOSKELETAL: Mild erythema and hyperthermia and tenderness over the anterior and lateral aspect of the left knee, with pain reported on range of motion.    CHIEF COMPLAINT as recorded by staff: Gout (LLE (mostly left knee))      Problem List Items Addressed This Visit        Unprioritized    Hyperbilirubinemia    Acute idiopathic gout of left knee - Primary    Type 2 diabetes mellitus without complication, without long-term current use of insulin (Chronic)    Current Assessment & Plan     Lab Results   Component Value Date    HGBA1C 6.2 (H) 11/08/2018    HGBA1C 6.1 (H) 11/29/2017    HGBA1C 6.8 (H) 06/28/2017    ESTGFRAFRICA >60.0 11/08/2018    EGFRNONAA >60.0 11/08/2018    MICALBCREAT 6.8 11/08/2018     BP Readings from Last 1 Encounters:   11/30/18 132/74     Wt Readings from Last 2 Encounters:   11/30/18 92.6 kg (204 lb 2.3 oz)   11/16/18 89.5 kg (197  "lb 5 oz)     BMI Readings from Last 2 Encounters:   11/30/18 29.71 kg/m²   11/16/18 29.14 kg/m²            Benign essential hypertension (Chronic)    Anxiety (Chronic)    Relevant Medications    ALPRAZolam (XANAX) 0.5 MG tablet          Follow-up in about 6 weeks (around 1/11/2019) for review test results and discuss treatment plan, re-evaluate problem(s) discussed today.    There are no Patient Instructions on file for this visit.    ABOUT THIS DOCUMENTATION:  · The order of the conditions listed in the HPI is one of convenience and does not necessarily reflect the chronology of the appointment, nor the relative importance of a condition.  · Documentation entered by me for this encounter was done in part using speech-recognition technology. Although I have made an effort to ensure accuracy, "sound like" errors may exist and should be interpreted in context.                        -PERRI Galvan MD      "

## 2018-11-30 NOTE — ASSESSMENT & PLAN NOTE
Lab Results   Component Value Date    HGBA1C 6.2 (H) 11/08/2018    HGBA1C 6.1 (H) 11/29/2017    HGBA1C 6.8 (H) 06/28/2017    ESTGFRAFRICA >60.0 11/08/2018    EGFRNONAA >60.0 11/08/2018    MICALBCREAT 6.8 11/08/2018     BP Readings from Last 1 Encounters:   11/30/18 132/74     Wt Readings from Last 2 Encounters:   11/30/18 92.6 kg (204 lb 2.3 oz)   11/16/18 89.5 kg (197 lb 5 oz)     BMI Readings from Last 2 Encounters:   11/30/18 29.71 kg/m²   11/16/18 29.14 kg/m²

## 2018-12-04 ENCOUNTER — LAB VISIT (OUTPATIENT)
Dept: LAB | Facility: HOSPITAL | Age: 58
End: 2018-12-04
Attending: FAMILY MEDICINE
Payer: COMMERCIAL

## 2018-12-04 ENCOUNTER — TELEPHONE (OUTPATIENT)
Dept: INTERNAL MEDICINE | Facility: CLINIC | Age: 58
End: 2018-12-04

## 2018-12-04 DIAGNOSIS — E78.5 DYSLIPIDEMIA ASSOCIATED WITH TYPE 2 DIABETES MELLITUS: Chronic | ICD-10-CM

## 2018-12-04 DIAGNOSIS — E11.69 DYSLIPIDEMIA ASSOCIATED WITH TYPE 2 DIABETES MELLITUS: Chronic | ICD-10-CM

## 2018-12-04 DIAGNOSIS — E80.6 HYPERBILIRUBINEMIA: ICD-10-CM

## 2018-12-04 DIAGNOSIS — M1A.09X1 IDIOPATHIC CHRONIC GOUT OF MULTIPLE SITES WITH TOPHUS: Chronic | ICD-10-CM

## 2018-12-04 LAB
ALBUMIN SERPL BCP-MCNC: 3.8 G/DL
ALP SERPL-CCNC: 105 U/L
ALT SERPL W/O P-5'-P-CCNC: 31 U/L
AST SERPL-CCNC: 24 U/L
BILIRUB DIRECT SERPL-MCNC: 0.2 MG/DL
BILIRUB SERPL-MCNC: 0.5 MG/DL
PROT SERPL-MCNC: 7.1 G/DL
URATE SERPL-MCNC: 3.2 MG/DL

## 2018-12-04 PROCEDURE — 80076 HEPATIC FUNCTION PANEL: CPT

## 2018-12-04 PROCEDURE — 84550 ASSAY OF BLOOD/URIC ACID: CPT

## 2018-12-04 PROCEDURE — 36415 COLL VENOUS BLD VENIPUNCTURE: CPT | Mod: PO

## 2018-12-04 NOTE — TELEPHONE ENCOUNTER
----- Message from Elana Hernandez sent at 12/3/2018  2:39 PM CST -----  Contact: Adam Fernandez is calling on be half of Pt. Rep is calling regarding [facility] paper work. ..[phone] Ext#[id number] Hit #[phone] if can not  reach Macie and anyone should be able to assist you.

## 2018-12-06 ENCOUNTER — TELEPHONE (OUTPATIENT)
Dept: RADIOLOGY | Facility: HOSPITAL | Age: 58
End: 2018-12-06

## 2018-12-07 ENCOUNTER — HOSPITAL ENCOUNTER (OUTPATIENT)
Dept: RADIOLOGY | Facility: HOSPITAL | Age: 58
Discharge: HOME | End: 2018-12-07
Attending: FAMILY MEDICINE
Payer: COMMERCIAL

## 2018-12-07 DIAGNOSIS — E80.6 HYPERBILIRUBINEMIA: ICD-10-CM

## 2018-12-07 PROCEDURE — 76705 ECHO EXAM OF ABDOMEN: CPT | Mod: TC,PO

## 2018-12-07 PROCEDURE — 76705 ECHO EXAM OF ABDOMEN: CPT | Mod: 26,,, | Performed by: RADIOLOGY

## 2018-12-14 ENCOUNTER — PATIENT MESSAGE (OUTPATIENT)
Dept: INTERNAL MEDICINE | Facility: CLINIC | Age: 58
End: 2018-12-14

## 2018-12-14 ENCOUNTER — TELEPHONE (OUTPATIENT)
Dept: INTERNAL MEDICINE | Facility: CLINIC | Age: 58
End: 2018-12-14

## 2018-12-14 NOTE — TELEPHONE ENCOUNTER
----- Message from Frances Curtis sent at 12/14/2018  2:37 PM CST -----  Contact: patient  Calling concerning the status of his FMLA paperwork. Please call patient ASAP today @ [phone]. Thanks, kavita

## 2018-12-17 ENCOUNTER — PATIENT MESSAGE (OUTPATIENT)
Dept: INTERNAL MEDICINE | Facility: CLINIC | Age: 58
End: 2018-12-17

## 2018-12-19 ENCOUNTER — PATIENT MESSAGE (OUTPATIENT)
Dept: INTERNAL MEDICINE | Facility: CLINIC | Age: 58
End: 2018-12-19

## 2018-12-21 ENCOUNTER — TELEPHONE (OUTPATIENT)
Dept: INTERNAL MEDICINE | Facility: CLINIC | Age: 58
End: 2018-12-21

## 2018-12-21 NOTE — TELEPHONE ENCOUNTER
----- Message from Uriah Woods sent at 12/21/2018  9:59 AM CST -----  Contact: Macie from [facility]  States she's calling to discuss the start and end date for the claim and can be reached at [phone]//thanks/dbw

## 2018-12-27 ENCOUNTER — TELEPHONE (OUTPATIENT)
Dept: INTERNAL MEDICINE | Facility: CLINIC | Age: 58
End: 2018-12-27

## 2018-12-27 NOTE — TELEPHONE ENCOUNTER
Returned call. Left message for Macie to return call. She has wanted to ask questions about patients [place] paperwork.

## 2018-12-27 NOTE — TELEPHONE ENCOUNTER
----- Message from Aruna Sorensen sent at 12/27/2018  9:55 AM CST -----  Contact: Macie Quijano called and stated she was calling to speak to the nurse regarding the pt's FMLA paperwork. She can be reached at [phone].    Thanks,  TF

## 2019-02-12 DIAGNOSIS — F41.9 ANXIETY: Chronic | ICD-10-CM

## 2019-02-12 DIAGNOSIS — M1A.09X1 IDIOPATHIC CHRONIC GOUT OF MULTIPLE SITES WITH TOPHUS: Chronic | ICD-10-CM

## 2019-02-12 RX ORDER — INDOMETHACIN 25 MG/1
25 CAPSULE ORAL 3 TIMES DAILY PRN
Qty: 30 CAPSULE | Refills: 0 | Status: SHIPPED | OUTPATIENT
Start: 2019-02-12 | End: 2020-03-19 | Stop reason: SDUPTHER

## 2019-02-12 RX ORDER — ALPRAZOLAM 0.5 MG/1
.25-.5 TABLET ORAL 2 TIMES DAILY PRN
Qty: 60 TABLET | Refills: 0 | Status: SHIPPED | OUTPATIENT
Start: 2019-02-12 | End: 2020-03-19 | Stop reason: SDUPTHER

## 2019-02-13 PROBLEM — K76.0 NON-ALCOHOLIC FATTY LIVER DISEASE: Chronic | Status: ACTIVE | Noted: 2019-02-13

## 2019-02-13 NOTE — TELEPHONE ENCOUNTER
Spoke with patients wife and advised her of refill appointment needed for patient. She advised she will notify him and have him call us. I verbalized understanding

## 2019-02-13 NOTE — TELEPHONE ENCOUNTER
TASK: Please let him know that I sent eRx refill for the requested medications, but I will need to see him for re-evaluation prior to giving additional refills. If he doesn't already have a timely follow-up appointment with me scheduled, please offer to help schedule one, anytime prior to needing another refill.

## 2019-03-19 ENCOUNTER — PATIENT MESSAGE (OUTPATIENT)
Dept: DIABETES | Facility: CLINIC | Age: 59
End: 2019-03-19

## 2019-04-05 LAB
LEFT EYE DM RETINOPATHY: NEGATIVE
RIGHT EYE DM RETINOPATHY: NEGATIVE

## 2019-05-03 ENCOUNTER — PATIENT MESSAGE (OUTPATIENT)
Dept: ADMINISTRATIVE | Facility: OTHER | Age: 59
End: 2019-05-03

## 2019-05-03 ENCOUNTER — OFFICE VISIT (OUTPATIENT)
Dept: INTERNAL MEDICINE | Facility: CLINIC | Age: 59
End: 2019-05-03
Payer: COMMERCIAL

## 2019-05-03 ENCOUNTER — LAB VISIT (OUTPATIENT)
Dept: LAB | Facility: HOSPITAL | Age: 59
End: 2019-05-03
Attending: FAMILY MEDICINE
Payer: COMMERCIAL

## 2019-05-03 VITALS
DIASTOLIC BLOOD PRESSURE: 94 MMHG | SYSTOLIC BLOOD PRESSURE: 146 MMHG | HEART RATE: 74 BPM | HEIGHT: 69 IN | RESPIRATION RATE: 18 BRPM | WEIGHT: 209 LBS | TEMPERATURE: 97 F | OXYGEN SATURATION: 98 % | BODY MASS INDEX: 30.96 KG/M2

## 2019-05-03 DIAGNOSIS — E11.69 TYPE 2 DIABETES MELLITUS WITH OTHER SPECIFIED COMPLICATION, WITHOUT LONG-TERM CURRENT USE OF INSULIN: ICD-10-CM

## 2019-05-03 DIAGNOSIS — J30.1 SEASONAL ALLERGIC RHINITIS DUE TO POLLEN: Chronic | ICD-10-CM

## 2019-05-03 DIAGNOSIS — I10 BENIGN ESSENTIAL HYPERTENSION: Chronic | ICD-10-CM

## 2019-05-03 DIAGNOSIS — E78.5 DYSLIPIDEMIA ASSOCIATED WITH TYPE 2 DIABETES MELLITUS: ICD-10-CM

## 2019-05-03 DIAGNOSIS — Z80.42 FAMILY HISTORY OF PROSTATE CANCER: ICD-10-CM

## 2019-05-03 DIAGNOSIS — Z23 NEED FOR DIPHTHERIA, TETANUS, PERTUSSIS, AND HIB VACCINATION: ICD-10-CM

## 2019-05-03 DIAGNOSIS — Z23 NEED FOR PNEUMOCOCCAL VACCINATION: ICD-10-CM

## 2019-05-03 DIAGNOSIS — F41.8 ANXIETY WITH DEPRESSION: ICD-10-CM

## 2019-05-03 DIAGNOSIS — E11.69 TYPE 2 DIABETES MELLITUS WITH OTHER SPECIFIED COMPLICATION, WITHOUT LONG-TERM CURRENT USE OF INSULIN: Primary | ICD-10-CM

## 2019-05-03 DIAGNOSIS — M1A.09X1 IDIOPATHIC CHRONIC GOUT OF MULTIPLE SITES WITH TOPHUS: Chronic | ICD-10-CM

## 2019-05-03 DIAGNOSIS — E11.69 DYSLIPIDEMIA ASSOCIATED WITH TYPE 2 DIABETES MELLITUS: ICD-10-CM

## 2019-05-03 DIAGNOSIS — K21.9 GASTROESOPHAGEAL REFLUX DISEASE WITHOUT ESOPHAGITIS: Chronic | ICD-10-CM

## 2019-05-03 DIAGNOSIS — E80.6 HYPERBILIRUBINEMIA: ICD-10-CM

## 2019-05-03 DIAGNOSIS — K76.0 NON-ALCOHOLIC FATTY LIVER DISEASE: Chronic | ICD-10-CM

## 2019-05-03 LAB
CHOLEST SERPL-MCNC: 194 MG/DL (ref 120–199)
CHOLEST/HDLC SERPL: 6.5 {RATIO} (ref 2–5)
ESTIMATED AVG GLUCOSE: 169 MG/DL (ref 68–131)
GLUCOSE SERPL-MCNC: 130 MG/DL (ref 70–110)
HBA1C MFR BLD HPLC: 7.5 % (ref 4–5.6)
HDLC SERPL-MCNC: 30 MG/DL (ref 40–75)
HDLC SERPL: 15.5 % (ref 20–50)
LDLC SERPL CALC-MCNC: 133.8 MG/DL (ref 63–159)
NONHDLC SERPL-MCNC: 164 MG/DL
TRIGL SERPL-MCNC: 151 MG/DL (ref 30–150)

## 2019-05-03 PROCEDURE — 3077F PR MOST RECENT SYSTOLIC BLOOD PRESSURE >= 140 MM HG: ICD-10-PCS | Mod: CPTII,S$GLB,, | Performed by: FAMILY MEDICINE

## 2019-05-03 PROCEDURE — 3045F PR MOST RECENT HEMOGLOBIN A1C LEVEL 7.0-9.0%: ICD-10-PCS | Mod: CPTII,S$GLB,, | Performed by: FAMILY MEDICINE

## 2019-05-03 PROCEDURE — 3080F DIAST BP >= 90 MM HG: CPT | Mod: CPTII,S$GLB,, | Performed by: FAMILY MEDICINE

## 2019-05-03 PROCEDURE — 3008F BODY MASS INDEX DOCD: CPT | Mod: CPTII,S$GLB,, | Performed by: FAMILY MEDICINE

## 2019-05-03 PROCEDURE — 82947 ASSAY GLUCOSE BLOOD QUANT: CPT

## 2019-05-03 PROCEDURE — 99999 PR PBB SHADOW E&M-EST. PATIENT-LVL V: CPT | Mod: PBBFAC,,, | Performed by: FAMILY MEDICINE

## 2019-05-03 PROCEDURE — 80061 LIPID PANEL: CPT

## 2019-05-03 PROCEDURE — 99214 PR OFFICE/OUTPT VISIT, EST, LEVL IV, 30-39 MIN: ICD-10-PCS | Mod: S$GLB,,, | Performed by: FAMILY MEDICINE

## 2019-05-03 PROCEDURE — 3080F PR MOST RECENT DIASTOLIC BLOOD PRESSURE >= 90 MM HG: ICD-10-PCS | Mod: CPTII,S$GLB,, | Performed by: FAMILY MEDICINE

## 2019-05-03 PROCEDURE — 99214 OFFICE O/P EST MOD 30 MIN: CPT | Mod: S$GLB,,, | Performed by: FAMILY MEDICINE

## 2019-05-03 PROCEDURE — 83036 HEMOGLOBIN GLYCOSYLATED A1C: CPT

## 2019-05-03 PROCEDURE — 3008F PR BODY MASS INDEX (BMI) DOCUMENTED: ICD-10-PCS | Mod: CPTII,S$GLB,, | Performed by: FAMILY MEDICINE

## 2019-05-03 PROCEDURE — 3045F PR MOST RECENT HEMOGLOBIN A1C LEVEL 7.0-9.0%: CPT | Mod: CPTII,S$GLB,, | Performed by: FAMILY MEDICINE

## 2019-05-03 PROCEDURE — 99999 PR PBB SHADOW E&M-EST. PATIENT-LVL V: ICD-10-PCS | Mod: PBBFAC,,, | Performed by: FAMILY MEDICINE

## 2019-05-03 PROCEDURE — 36415 COLL VENOUS BLD VENIPUNCTURE: CPT

## 2019-05-03 PROCEDURE — 3077F SYST BP >= 140 MM HG: CPT | Mod: CPTII,S$GLB,, | Performed by: FAMILY MEDICINE

## 2019-05-03 RX ORDER — COLCHICINE 0.6 MG/1
1 CAPSULE ORAL
COMMUNITY
Start: 2019-02-22 | End: 2020-03-19 | Stop reason: SDUPTHER

## 2019-05-03 RX ORDER — MONTELUKAST SODIUM 10 MG/1
10 TABLET ORAL NIGHTLY
Qty: 90 TABLET | Refills: 3 | Status: SHIPPED | OUTPATIENT
Start: 2019-05-03 | End: 2020-03-19 | Stop reason: SDUPTHER

## 2019-05-03 RX ORDER — LOSARTAN POTASSIUM 50 MG/1
50 TABLET ORAL DAILY
Qty: 30 TABLET | Refills: 0 | Status: SHIPPED | OUTPATIENT
Start: 2019-05-03 | End: 2019-05-27 | Stop reason: DRUGHIGH

## 2019-05-03 NOTE — PROGRESS NOTES
CHIEF COMPLAINT  Annual Exam      HISTORY, ASSESSMENT, and PLAN    Problem List Items Addressed This Visit        Psychiatric    Anxiety with depression  ASSESSMENT:  Overall worse.  Severity described as moderate.  No suicidal thoughts or disordered thinking.  We discussed treatment options.  He is averse to starting antidepressant medicine at this point.      Relevant Orders    Ambulatory referral to Psychiatry    Ambulatory referral to Psychiatry       Cardiac/Vascular    Benign essential hypertension (Chronic)  ASSESSMENT:  Asymptomatic.  NOT controlled.      Relevant Medications    losartan (COZAAR) 50 MG tablet    Other Relevant Orders    Hypertension Third Age Medicine (HDMP) Enrollment Order (Completed)    Hypertension Digital Medicine (HDMP): Assign Onboarding Questionnaires (Completed)    Dyslipidemia associated with type 2 diabetes mellitus (Chronic)  ASSESSMENT: He appears to be tolerating statin for dyslipidemia without apparent symptoms of myositis or hepatic dysfunction.       Overview     Lab Results   Component Value Date    CHOL 190 11/08/2018    CHOL 173 11/29/2017    CHOL 179 06/28/2017    TRIG 153 (H) 11/08/2018    TRIG 130 11/29/2017    TRIG 151 (H) 06/28/2017    HDL 34 (L) 11/08/2018    HDL 34 (L) 11/29/2017    HDL 29 (L) 06/28/2017    LDLCALC 125.4 11/08/2018    LDLCALC 113.0 11/29/2017    LDLCALC 119.8 06/28/2017    NONHDLCHOL 156 11/08/2018    NONHDLCHOL 139 11/29/2017    NONHDLCHOL 150 06/28/2017    AST 35 11/08/2018    ALT 30 11/08/2018     Wt Readings from Last 2 Encounters:   11/08/18 89.7 kg (197 lb 12 oz)   09/24/18 86.5 kg (190 lb 11.2 oz)     BMI Readings from Last 2 Encounters:   11/08/18 29.20 kg/m²   09/24/18 28.16 kg/m²     The 10-year ASCVD risk score (Madeliadeo BABCOCK Jr., et al., 2013) is: 16.8%    Values used to calculate the score:      Age: 58 years      Sex: Male      Is Non- : No      Diabetic: Yes      Tobacco smoker: No      Systolic Blood Pressure: 126  mmHg      Is BP treated: Yes      HDL Cholesterol: 34 mg/dL      Total Cholesterol: 190 mg/dL          Relevant Orders    Lipid panel (Completed)       Endocrine    Type 2 diabetes mellitus with other specified complication - Primary  ASSESSMENT:  Appears controlled. He is due for follow-up labs regarding this problem.     Lab Results   Component Value Date    HGBA1C 6.2 (H) 11/08/2018    HGBA1C 6.1 (H) 11/29/2017    ESTGFRAFRICA >60.0 11/08/2018    EGFRNONAA >60.0 11/08/2018    MICALBCREAT 6.8 11/08/2018         Relevant Orders    Hemoglobin A1c (Completed)    Glucose, random (Completed)       GI    Gastroesophageal reflux disease without esophagitis (Chronic)  ASSESSMENT:  Appears controlled with over-the-counter medicines.      Non-alcoholic fatty liver disease (Chronic)  ASSESSMENT:  Clinically stable.      Overview     US Abdomen Limited  Narrative: EXAMINATION:  US ABDOMEN LIMITED    CLINICAL HISTORY:  Other disorders of bilirubin metabolism    TECHNIQUE:  Limited ultrasound of the right upper quadrant of the abdomen (including pancreas, liver, gallbladder, common bile duct, and right kidney) was performed.    COMPARISON:  None    FINDINGS:  Liver: Normal in size measuring 20.3 cm. The liver demonstrates homogeneously increased in echotexture most consistent with diffuse fatty infiltration.  No focal hepatic lesions are seen.    Biliary system: The gallbladder demonstrates no evidence of calculi. No gallbladder wall thickening.  No sonographic Beyer sign. No pericholecystic fluid. The common duct is not dilated, measuring 3.2 mm. No intrahepatic ductal dilatation.    Pancreas: The visualized portions of pancreas appear normal    Right kidney: Normal in size measuring 11.6 cm with no hydronephrosis.    Miscellaneous: No ascites.  Impression: 1. Hepatomegaly with increased echotexture of the liver suggesting diffuse fatty infiltration.  .    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  Lab Results   Component Value Date    URICACID 3.2 (L) 12/04/2018    URICACID 8.6 (H) 11/08/2018    URICACID 9.7 (H) 09/24/2018    ESTGFRAFRICA >60.0 11/08/2018    EGFRNONAA >60.0 11/08/2018    CREATININE 1.1 11/08/2018    BUN 11 11/08/2018          Other    Family history of prostate cancer  ASSESSMENT:  I offered to order PSA and/or perform prostate exam, but he expressed preference to be referred to Urology.      Relevant Orders    Ambulatory Referral to Urology    Hyperbilirubinemia  ASSESSMENT:  This problem is asymptomatic.  No jaundice.      Seasonal allergic rhinitis due to pollen (Chronic)  ASSESSMENT:  He reports good control with montelukast monotherapy.  He says he did not do well with antihistamines or nasal corticosteroids any does not want to try these.      Relevant Medications    montelukast (SINGULAIR) 10 mg tablet      Other Visit Diagnoses     Need for diphtheria, tetanus, pertussis, and Hib vaccination        Need for pneumococcal vaccination               Outpatient Medications Prior to Visit   Medication Sig Dispense Refill    allopurinol (ZYLOPRIM) 300 MG tablet Take 1 tablet (300 mg total) by mouth once daily. (FOR GOUT PREVENTION) 90 tablet 3    ALPRAZolam (XANAX) 0.5 MG tablet Take 0.5-1 tablets (0.25-0.5 mg total) by mouth 2 (two) times daily as needed for Anxiety. - APPOINTMENT REQUIRED FOR MORE REFILLS 60 tablet 0    ASCORBATE CALCIUM (VITAMIN C ORAL) Take by mouth every other day.      atorvastatin (LIPITOR) 20 MG tablet Take 1 tablet (20 mg total) by mouth every evening. - FOR HEART HEALTH 90 tablet 3    colchicine-probenecid 0.5-500 mg (CO-BENEMID) 500-0.5 mg Tab Take 1 tablet by mouth once daily. (Patient taking differently: Take 1 tablet by mouth as needed. ) 20 tablet 1    indomethacin (INDOCIN) 25 MG capsule  "Take 1 capsule (25 mg total) by mouth 3 (three) times daily as needed (ACUTE GOUT). - APPOINTMENT REQUIRED FOR MORE REFILLS 30 capsule 0    LACTOBAC NO.41/BIFIDOBACT NO.7 (PROBIOTIC-10 ORAL) Take by mouth.      methocarbamol (ROBAXIN) 500 MG Tab Take 1 tablet (500 mg total) by mouth 2 (two) times daily as needed (muscle spasm). - DISCUSS MORE REFILLS AT APPOINTMENT 10AM FRI, 11/16/18 10 tablet 0    montelukast (SINGULAIR) 10 mg tablet TAKE 1 TABLET BY MOUTH EVERY EVENING 30 tablet 0    MITIGARE 0.6 mg Cap Take 1 capsule by mouth.      losartan (COZAAR) 50 MG tablet TAKE ONE TABLET BY MOUTH ONCE DAILY 90 tablet 3     No facility-administered medications prior to visit.         Medications Ordered This Encounter   Medications    losartan (COZAAR) 50 MG tablet     Sig: Take 1 tablet (50 mg total) by mouth once daily.     Dispense:  30 tablet     Refill:  0    montelukast (SINGULAIR) 10 mg tablet     Sig: Take 1 tablet (10 mg total) by mouth every evening.     Dispense:  90 tablet     Refill:  3     Please consider 90 day supplies to promote better adherence       Medications Discontinued During This Encounter   Medication Reason    losartan (COZAAR) 50 MG tablet Reorder    montelukast (SINGULAIR) 10 mg tablet Reorder        Follow up in about 2 weeks (around 5/17/2019) for re-evaluate problem(s) discussed today.    REVIEW OF SYSTEMS  CONSTITUTIONAL: No fever reported.  CARDIOVASCULAR: No chest pain reported.  PULMONARY: No trouble breathing reported.   ENDOCRINE: No polyuria or polydipsia reported.     PHYSICAL EXAM  Vitals:    05/03/19 1318   BP: (!) 146/94   BP Location: Right arm   Patient Position: Sitting   BP Method: Large (Manual)   Pulse: 74   Resp: 18   Temp: 97.2 °F (36.2 °C)   TempSrc: Tympanic   SpO2: 98%   Weight: 94.8 kg (208 lb 15.9 oz)   Height: 5' 9" (1.753 m)     CONSTITUTIONAL: Vital signs noted. No apparent distress. Does not appear acutely ill or septic. Appears adequately " "hydrated.  HEENT: External ENT grossly unremarkable. Hearing grossly intact. Oropharynx moist.  PULM: Lungs clear. Breathing unlabored.  HEART: Auscultation reveals regular rate and rhythm without murmur, gallop or rub.  DERM: Skin warm and moist with normal turgor.  NEURO: There are no gross focal motor deficits or gross deficits of cranial nerves III-XII.  PSYCHIATRIC: Alert and conversant. Mood grossly neutral. Judgment and insight not grossly compromised.     Documentation entered by me for this encounter may have been done in part using speech-recognition technology. Although I have made an effort to ensure accuracy, "sound like" errors may exist and should be interpreted in context. -PERRI Galvan MD   "

## 2019-05-03 NOTE — PATIENT INSTRUCTIONS
Telemedicine Virtual Visits    What is a Virtual Visit?  A virtual visit is a secure video appointment with your provider via your smartphone or tablet.  This allows you to conduct a traditional office visit with your provider electronically through your [employer] aubrey without leaving home or work.    How much is a Virtual Visit?  If you have health insurance, your insurance will be billed for the virtual visit. If your insurance does not cover a virtual visit (or if you do not have health insurance), you will be responsible for a $54 fee. If your insurance covers the virtual visit, you will be responsible for your co-pay, like when you come in for an office visit. (More and more insurances are covering virtual visits. If you have questions about your insurance coverage for virtual visits, you will need to contact your health insurance provider.)    Preparing for your Virtual Visit  · You must have a mobile phone or tablet with iOS or Android operating system.  · Your device must have a front-facing camera.  · You must have the ProteoSense aubrey installed and [facility] selected as your healthcare provider.  · You can find the ProteoSense aubrey in the [employer] Store (Dimdim) and Google Play Store (Templafy).  · If you need help with ProteoSense, help is available:  · online at [email or web address]   at the O[facility]bar in the 1st floor lobby of [facility]  · or by phone at [phone]    E-Pre-Check  · Once your Virtual Visit is scheduled you will need to complete the ePre-Check in the [employer] aubrey before your visit.   · During ePre-Check you will verify your insurance, demographics and sign the Telehealth Consent form.        How to Start Your Virtual Visit  Once your ePre-check is complete, you will need to test your hardware prior to your scheduled appointment.    1. Select the box that has your upcoming Virtual Visit appointment.  2.  On the next screen select the 'Test Video' button on the bottom of  "the screen.  3.  If successful, you will receive a pop-up saying "You're all set."          Ready for your Virtual Visit Appointment   · Select 'Appointments' from the [employer] home screen.  · Find and select today's Virtual Visit Appointment.  · Select 'Begin Visit'. Once selected you will enter a virtual visit waiting room until your provider arrives.       "

## 2019-05-16 NOTE — PROGRESS NOTES
Please contact him and let him know that his labs show that his diabetes is worse and we need to make adjustments in his treatment to get his blood sugar under control and lower his risk for kidney problems and heart problems.  Remind him of his upcoming appointment with me, and tell him that we will discuss his treatment options.  Thanks.    Future Appointments  5/23/2019  8:00 AM    PERRI Galvan MD      [facility]

## 2019-05-20 ENCOUNTER — PATIENT MESSAGE (OUTPATIENT)
Dept: INTERNAL MEDICINE | Facility: CLINIC | Age: 59
End: 2019-05-20

## 2019-05-20 ENCOUNTER — PATIENT OUTREACH (OUTPATIENT)
Dept: ADMINISTRATIVE | Facility: HOSPITAL | Age: 59
End: 2019-05-20

## 2019-05-27 ENCOUNTER — OFFICE VISIT (OUTPATIENT)
Dept: INTERNAL MEDICINE | Facility: CLINIC | Age: 59
End: 2019-05-27
Payer: COMMERCIAL

## 2019-05-27 VITALS
HEART RATE: 99 BPM | WEIGHT: 214.5 LBS | OXYGEN SATURATION: 95 % | SYSTOLIC BLOOD PRESSURE: 158 MMHG | TEMPERATURE: 97 F | BODY MASS INDEX: 31.77 KG/M2 | HEIGHT: 69 IN | DIASTOLIC BLOOD PRESSURE: 92 MMHG

## 2019-05-27 DIAGNOSIS — E11.69 TYPE 2 DIABETES MELLITUS WITH OTHER SPECIFIED COMPLICATION, WITHOUT LONG-TERM CURRENT USE OF INSULIN: ICD-10-CM

## 2019-05-27 DIAGNOSIS — I10 BENIGN ESSENTIAL HYPERTENSION: Primary | Chronic | ICD-10-CM

## 2019-05-27 DIAGNOSIS — E78.5 DYSLIPIDEMIA ASSOCIATED WITH TYPE 2 DIABETES MELLITUS: Chronic | ICD-10-CM

## 2019-05-27 DIAGNOSIS — E11.69 DYSLIPIDEMIA ASSOCIATED WITH TYPE 2 DIABETES MELLITUS: Chronic | ICD-10-CM

## 2019-05-27 DIAGNOSIS — F41.8 ANXIETY WITH DEPRESSION: ICD-10-CM

## 2019-05-27 PROCEDURE — 3080F DIAST BP >= 90 MM HG: CPT | Mod: CPTII,S$GLB,, | Performed by: FAMILY MEDICINE

## 2019-05-27 PROCEDURE — 3008F BODY MASS INDEX DOCD: CPT | Mod: CPTII,S$GLB,, | Performed by: FAMILY MEDICINE

## 2019-05-27 PROCEDURE — 3045F PR MOST RECENT HEMOGLOBIN A1C LEVEL 7.0-9.0%: CPT | Mod: CPTII,S$GLB,, | Performed by: FAMILY MEDICINE

## 2019-05-27 PROCEDURE — 99999 PR PBB SHADOW E&M-EST. PATIENT-LVL IV: CPT | Mod: PBBFAC,,, | Performed by: FAMILY MEDICINE

## 2019-05-27 PROCEDURE — 99999 PR PBB SHADOW E&M-EST. PATIENT-LVL IV: ICD-10-PCS | Mod: PBBFAC,,, | Performed by: FAMILY MEDICINE

## 2019-05-27 PROCEDURE — 3008F PR BODY MASS INDEX (BMI) DOCUMENTED: ICD-10-PCS | Mod: CPTII,S$GLB,, | Performed by: FAMILY MEDICINE

## 2019-05-27 PROCEDURE — 3045F PR MOST RECENT HEMOGLOBIN A1C LEVEL 7.0-9.0%: ICD-10-PCS | Mod: CPTII,S$GLB,, | Performed by: FAMILY MEDICINE

## 2019-05-27 PROCEDURE — 3077F PR MOST RECENT SYSTOLIC BLOOD PRESSURE >= 140 MM HG: ICD-10-PCS | Mod: CPTII,S$GLB,, | Performed by: FAMILY MEDICINE

## 2019-05-27 PROCEDURE — 3080F PR MOST RECENT DIASTOLIC BLOOD PRESSURE >= 90 MM HG: ICD-10-PCS | Mod: CPTII,S$GLB,, | Performed by: FAMILY MEDICINE

## 2019-05-27 PROCEDURE — 99214 PR OFFICE/OUTPT VISIT, EST, LEVL IV, 30-39 MIN: ICD-10-PCS | Mod: S$GLB,,, | Performed by: FAMILY MEDICINE

## 2019-05-27 PROCEDURE — 99214 OFFICE O/P EST MOD 30 MIN: CPT | Mod: S$GLB,,, | Performed by: FAMILY MEDICINE

## 2019-05-27 PROCEDURE — 3077F SYST BP >= 140 MM HG: CPT | Mod: CPTII,S$GLB,, | Performed by: FAMILY MEDICINE

## 2019-05-27 RX ORDER — LOSARTAN POTASSIUM 100 MG/1
100 TABLET ORAL DAILY
Qty: 30 TABLET | Refills: 1 | Status: SHIPPED | OUTPATIENT
Start: 2019-05-27 | End: 2019-07-19 | Stop reason: ALTCHOICE

## 2019-05-27 RX ORDER — METFORMIN HYDROCHLORIDE 500 MG/1
500 TABLET, EXTENDED RELEASE ORAL 2 TIMES DAILY WITH MEALS
Qty: 180 TABLET | Refills: 1 | Status: SHIPPED | OUTPATIENT
Start: 2019-05-27 | End: 2019-08-08 | Stop reason: DRUGHIGH

## 2019-05-27 NOTE — PATIENT INSTRUCTIONS
It is important that act soon and call [phone] to schedule your appointment with the mental health specialist as we discussed today.    Call [employer] support ([phone]) and ask their help to sync your blood pressure readings with [employer].

## 2019-05-28 ENCOUNTER — TELEPHONE (OUTPATIENT)
Dept: PSYCHIATRY | Facility: CLINIC | Age: 59
End: 2019-05-28

## 2019-05-29 ENCOUNTER — PATIENT MESSAGE (OUTPATIENT)
Dept: OTHER | Facility: OTHER | Age: 59
End: 2019-05-29

## 2019-05-30 ENCOUNTER — PATIENT MESSAGE (OUTPATIENT)
Dept: ADMINISTRATIVE | Facility: OTHER | Age: 59
End: 2019-05-30

## 2019-06-08 ENCOUNTER — PATIENT MESSAGE (OUTPATIENT)
Dept: INTERNAL MEDICINE | Facility: CLINIC | Age: 59
End: 2019-06-08

## 2019-06-09 NOTE — ASSESSMENT & PLAN NOTE
Lab Results   Component Value Date    HGBA1C 7.5 (H) 05/03/2019    HGBA1C 6.2 (H) 11/08/2018    HGBA1C 6.1 (H) 11/29/2017    ESTGFRAFRICA >60.0 11/08/2018    EGFRNONAA >60.0 11/08/2018    MICALBCREAT 6.8 11/08/2018     BP Readings from Last 1 Encounters:   05/27/19 (!) 158/92     Wt Readings from Last 3 Encounters:   05/27/19 97.3 kg (214 lb 8.1 oz)   05/03/19 94.8 kg (208 lb 15.9 oz)   11/30/18 92.6 kg (204 lb 2.3 oz)     Body mass index is 31.68 kg/m².    HEALTH MAINTENANCE: Diabetic health maintenance interventions reviewed and are up to date except for:  There are no preventive care reminders to display for this patient.    Worse.  Plan:  Therapeutic lifestyle changes.  Begin metformin.

## 2019-06-09 NOTE — PROGRESS NOTES
CHIEF COMPLAINT  Follow-up (2wk )      HISTORY, ASSESSMENT, and PLAN    1. Benign essential hypertension    2. Type 2 diabetes mellitus with other specified complication, without long-term current use of insulin    3. Anxiety with depression    4. Dyslipidemia associated with type 2 diabetes mellitus      Orders Placed This Encounter    Hemoglobin A1c    Hypertension Digital Medicine ([facility]) Enrollment Order    losartan (COZAAR) 100 MG tablet    metFORMIN (GLUCOPHAGE-XR) 500 MG 24 hr tablet      Problem List Items Addressed This Visit        Psychiatric    Anxiety with depression    Current Assessment & Plan     Controlled/stable/compensated.            Cardiac/Vascular    Benign essential hypertension - Primary (Chronic)    Current Assessment & Plan     BP Readings from Last 5 Encounters:   05/27/19 (!) 158/92   05/03/19 (!) 146/94   11/30/18 132/74   11/16/18 130/84   11/08/18 126/82    Asymptomatic.  Uncontrolled.  It was agreed to increase his losartan dose and he would enroll in digital medicine hypertension program.         Relevant Medications    losartan (COZAAR) 100 MG tablet    Other Relevant Orders    Hypertension Digital Medicine ([facility]) Enrollment Order (Completed)    Dyslipidemia associated with type 2 diabetes mellitus (Chronic)    Current Assessment & Plan     He appears to be tolerating statin for dyslipidemia without apparent symptoms of myositis or hepatic dysfunction.          Relevant Medications    metFORMIN (GLUCOPHAGE-XR) 500 MG 24 hr tablet       Endocrine    Type 2 diabetes mellitus with other specified complication    Current Assessment & Plan     Lab Results   Component Value Date    HGBA1C 7.5 (H) 05/03/2019    HGBA1C 6.2 (H) 11/08/2018    HGBA1C 6.1 (H) 11/29/2017    ESTGFRAFRICA >60.0 11/08/2018    EGFRNONAA >60.0 11/08/2018    MICALBCREAT 6.8 11/08/2018     BP Readings from Last 1 Encounters:   05/27/19 (!) 158/92     Wt Readings from Last 3 Encounters:   05/27/19 97.3 kg (214 lb  8.1 oz)   05/03/19 94.8 kg (208 lb 15.9 oz)   11/30/18 92.6 kg (204 lb 2.3 oz)     Body mass index is 31.68 kg/m².    HEALTH MAINTENANCE: Diabetic health maintenance interventions reviewed and are up to date except for:  There are no preventive care reminders to display for this patient.    Worse.  Plan:  Therapeutic lifestyle changes.  Begin metformin.         Relevant Medications    metFORMIN (GLUCOPHAGE-XR) 500 MG 24 hr tablet    Other Relevant Orders    Hemoglobin A1c           Outpatient Medications Prior to Visit   Medication Sig Dispense Refill    allopurinol (ZYLOPRIM) 300 MG tablet Take 1 tablet (300 mg total) by mouth once daily. (FOR GOUT PREVENTION) 90 tablet 3    LACTOBAC NO.41/BIFIDOBACT NO.7 (PROBIOTIC-10 ORAL) Take by mouth.      methocarbamol (ROBAXIN) 500 MG Tab Take 1 tablet (500 mg total) by mouth 2 (two) times daily as needed (muscle spasm). - DISCUSS MORE REFILLS AT APPOINTMENT 10AM FRI, 11/16/18 10 tablet 0    montelukast (SINGULAIR) 10 mg tablet Take 1 tablet (10 mg total) by mouth every evening. 90 tablet 3    losartan (COZAAR) 50 MG tablet Take 1 tablet (50 mg total) by mouth once daily. 30 tablet 0    ALPRAZolam (XANAX) 0.5 MG tablet Take 0.5-1 tablets (0.25-0.5 mg total) by mouth 2 (two) times daily as needed for Anxiety. - APPOINTMENT REQUIRED FOR MORE REFILLS 60 tablet 0    ASCORBATE CALCIUM (VITAMIN C ORAL) Take by mouth every other day.      atorvastatin (LIPITOR) 20 MG tablet Take 1 tablet (20 mg total) by mouth every evening. - FOR HEART HEALTH 90 tablet 3    colchicine-probenecid 0.5-500 mg (CO-BENEMID) 500-0.5 mg Tab Take 1 tablet by mouth once daily. (Patient taking differently: Take 1 tablet by mouth as needed. ) 20 tablet 1    indomethacin (INDOCIN) 25 MG capsule Take 1 capsule (25 mg total) by mouth 3 (three) times daily as needed (ACUTE GOUT). - APPOINTMENT REQUIRED FOR MORE REFILLS 30 capsule 0    MITIGARE 0.6 mg Cap Take 1 capsule by mouth.       No  "facility-administered medications prior to visit.         Medications Ordered This Encounter   Medications    losartan (COZAAR) 100 MG tablet     Sig: Take 1 tablet (100 mg total) by mouth once daily.     Dispense:  30 tablet     Refill:  1     NOTE DOSE CHANGE. This REPLACES any prior prescription for this drug. DISCONTINUE any prior prescription for this drug.    metFORMIN (GLUCOPHAGE-XR) 500 MG 24 hr tablet     Sig: Take 1 tablet (500 mg total) by mouth 2 (two) times daily with meals.     Dispense:  180 tablet     Refill:  1       Medications Discontinued During This Encounter   Medication Reason    losartan (COZAAR) 50 MG tablet Dose adjustment        Follow up in about 2 weeks (around 6/10/2019) for re-check blood pressure.    REVIEW OF SYSTEMS  CARDIOVASCULAR: No angina or orthopnea reported.   ENDOCRINE: No polyuria or polydipsia reported.     PHYSICAL EXAM  Vitals:    05/27/19 1603   BP: (!) 158/92   BP Location: Right arm   Patient Position: Sitting   BP Method: Medium (Manual)   Pulse: 99   Temp: 97.2 °F (36.2 °C)   TempSrc: Tympanic   SpO2: 95%   Weight: 97.3 kg (214 lb 8.1 oz)   Height: 5' 9" (1.753 m)     CONSTITUTIONAL: Vital signs noted. No apparent distress. Does not appear acutely ill or septic. Appears adequately hydrated.  ENT: Oropharynx moist.  PULM: Breathing unlabored.  CV: Heart regular.  DERM: Skin normothermic.  PSYCHIATRIC: Alert and conversant. Grossly oriented. Mood is grossly neutral. Affect appropriate. Judgment and insight not grossly compromised.     Documentation entered by me for this encounter may have been done in part using speech-recognition technology. Although I have made an effort to ensure accuracy, "sound like" errors may exist and should be interpreted in context. -PERRI Galvan MD.   "

## 2019-06-09 NOTE — ASSESSMENT & PLAN NOTE
BP Readings from Last 5 Encounters:   05/27/19 (!) 158/92   05/03/19 (!) 146/94   11/30/18 132/74   11/16/18 130/84   11/08/18 126/82    Asymptomatic.  Uncontrolled.  It was agreed to increase his losartan dose and he would enroll in digital medicine hypertension program.

## 2019-06-10 ENCOUNTER — PATIENT OUTREACH (OUTPATIENT)
Dept: OTHER | Facility: OTHER | Age: 59
End: 2019-06-10

## 2019-06-10 NOTE — PROGRESS NOTES
1st attempt to complete enrollment call. No answer, left voicemail.       Last 5 Patient Entered Readings                                      Current 30 Day Average: 149/87     Recent Readings 6/7/2019 6/6/2019    SBP (mmHg) 136 161    DBP (mmHg) 85 88    Pulse 69 86

## 2019-06-10 NOTE — LETTER
July 16, 2019     Shelton Young  [id number] [place] [address]       Dear Shelton,    Welcome to [facility] Our goal is to make care effective, proactive and convenient by using data you send us from home to better treat your chronic conditions.          My name is Bernadette Parrish, and I am your dedicated Digital Medicine clinician. As an expert in medication management, I will help ensure that the medications you are taking continue to provide the intended benefits and help you reach your goals. You can reach me directly at [phone] or by sending me a message directly through your [facility] account.      I am Sharmin Jones and I will be your health . My job is to help you identify lifestyle changes to improve your disease control. We will talk about nutrition, exercise, and other ways you may be able to adjust your current habits to better your health. Additionally, we will help ensure you are completing the tests and screenings that are necessary to help manage your conditions. You can reach me directly at [phone] or by sending me a message directly through your [facility] account.    Most importantly, YOU are at the center of this team. Together, we will work to improve your overall health and encourage you to meet your goals for a healthier lifestyle.     What we expect from YOU:  · Please take frequent home blood pressure measurements. We ask that you take at least 1 blood pressure reading per week, but more information will better help us get you know you. Be sure you rest for a few minutes before taking the reading in a quiet, comfortable place.     Be available to receive phone calls or [facility] messages, when appropriate, from your care team. Please let us know if there are any specific days or times that work best for us to reach you via phone.     Complete routine tests and screenings. Dont worry, we will help keep you on track!           What you should  expect from your Digital Medicine Care Team:   We will work with you to create a personalized plan of care and provide you with encouragement and education, including regarding lifestyle changes, that could help you manage your disease states.     We will adjust your current medications, if needed, and continue to monitor your long-term progress.     We will provide you and your physician with monthly progress reports after you have been in the program for more than 30 days.     We will send you reminders through [facility] and text messages to help ensure you do not miss any testing deadlines to help manage your disease states.    You will be able to reach us by phone or through your [facility] account by clicking our names under Care Team on the right side of the home screen.    I look forward to working with you to achieve your blood pressure goals!    We look forward to working with you to help manage your health,    Sincerely,    Your Digital Medicine Team    Please visit our websites to learn more:   · Hypertension: [email or web address]      Remember, we are not available for emergencies. If you have an emergency, please contact your doctors office directly or call [facility] on-call ([phone] or [phone]) or [phone].

## 2019-06-11 ENCOUNTER — PATIENT MESSAGE (OUTPATIENT)
Dept: INTERNAL MEDICINE | Facility: CLINIC | Age: 59
End: 2019-06-11

## 2019-06-17 ENCOUNTER — PATIENT MESSAGE (OUTPATIENT)
Dept: ADMINISTRATIVE | Facility: OTHER | Age: 59
End: 2019-06-17

## 2019-06-18 DIAGNOSIS — E11.9 TYPE 2 DIABETES MELLITUS: ICD-10-CM

## 2019-07-02 ENCOUNTER — PATIENT MESSAGE (OUTPATIENT)
Dept: ADMINISTRATIVE | Facility: OTHER | Age: 59
End: 2019-07-02

## 2019-07-02 NOTE — PROGRESS NOTES
2nd attempt for enrollment call. Family member answered and advised to try Pt back in about an hour.  Sent my portal message.        Last 5 Patient Entered Readings                                      Current 30 Day Average: 149/86     Recent Readings 6/29/2019 6/26/2019 6/22/2019 6/21/2019 6/21/2019    SBP (mmHg) 153 170 144 163 144    DBP (mmHg) 90 85 89 88 88    Pulse 76 83 72 80 69

## 2019-07-02 NOTE — PROGRESS NOTES
4th attempt for enrollment call.  No answer, left voicemail.        Last 5 Patient Entered Readings                                      Current 30 Day Average: 149/86     Recent Readings 6/29/2019 6/26/2019 6/22/2019 6/21/2019 6/21/2019    SBP (mmHg) 153 170 144 163 144    DBP (mmHg) 90 85 89 88 88    Pulse 76 83 72 80 69

## 2019-07-03 ENCOUNTER — PATIENT MESSAGE (OUTPATIENT)
Dept: INTERNAL MEDICINE | Facility: CLINIC | Age: 59
End: 2019-07-03

## 2019-07-15 NOTE — PROGRESS NOTES
Alert Pt .1st attempt for enrollment call.  No answer, left voicemail.      Last 5 Patient Entered Readings                                      Current 30 Day Average: 154/88     Recent Readings 7/9/2019 7/3/2019 6/29/2019 6/26/2019 6/22/2019    SBP (mmHg) 165 152 153 170 144    DBP (mmHg) 99 89 90 85 89    Pulse 73 68 76 83 72

## 2019-07-16 ENCOUNTER — PATIENT OUTREACH (OUTPATIENT)
Dept: OTHER | Facility: OTHER | Age: 59
End: 2019-07-16

## 2019-07-16 NOTE — PROGRESS NOTES
Last 5 Patient Entered Readings                                      Current 30 Day Average: 155/89     Recent Readings 7/15/2019 7/9/2019 7/3/2019 6/29/2019 6/26/2019    SBP (mmHg) 155 165 152 153 170    DBP (mmHg) 93 99 89 90 85    Pulse 73 73 68 76 83        Hypertension Medications     losartan (COZAAR) 100 MG tablet Take 1 tablet (100 mg total) by mouth once daily.     I reviewed elevated BP readings submitted through digital medicine program.  has attempted to reach patient to complete enrollment in the digital medicine program. Patient has not answered phone calls and  was told that it is not a good time to contact the patient. I will follow up with the patient via [employer] message.       Bernadette Parrish, Pharm.D.  [facility] Clinical Pharmacist  [phone]

## 2019-07-16 NOTE — PROGRESS NOTES
Pt has been referred to Clinician at this time as he does not want any contact at this time.     Last 5 Patient Entered Readings                                      Current 30 Day Average: 155/89     Recent Readings 7/15/2019 7/9/2019 7/3/2019 6/29/2019 6/26/2019    SBP (mmHg) 155 165 152 153 170    DBP (mmHg) 93 99 89 90 85    Pulse 73 73 68 76 83

## 2019-07-17 ENCOUNTER — PATIENT MESSAGE (OUTPATIENT)
Dept: INTERNAL MEDICINE | Facility: CLINIC | Age: 59
End: 2019-07-17

## 2019-07-18 NOTE — TELEPHONE ENCOUNTER
His blood pressure is uncontrolled.  He was supposed to follow up month ago to recheck his blood pressure.    ACTION NEEDED: Please schedule him for appointment within the next 1-2  weeks.  Tell him that we can address his Urology referral when he returns for that appointment.

## 2019-07-19 ENCOUNTER — OFFICE VISIT (OUTPATIENT)
Dept: INTERNAL MEDICINE | Facility: CLINIC | Age: 59
End: 2019-07-19
Payer: COMMERCIAL

## 2019-07-19 VITALS
TEMPERATURE: 98 F | OXYGEN SATURATION: 97 % | HEART RATE: 70 BPM | BODY MASS INDEX: 31.71 KG/M2 | HEIGHT: 69 IN | DIASTOLIC BLOOD PRESSURE: 96 MMHG | SYSTOLIC BLOOD PRESSURE: 162 MMHG | WEIGHT: 214.06 LBS

## 2019-07-19 DIAGNOSIS — E11.69 TYPE 2 DIABETES MELLITUS WITH OTHER SPECIFIED COMPLICATION, WITHOUT LONG-TERM CURRENT USE OF INSULIN: ICD-10-CM

## 2019-07-19 DIAGNOSIS — E78.5 DYSLIPIDEMIA ASSOCIATED WITH TYPE 2 DIABETES MELLITUS: Chronic | ICD-10-CM

## 2019-07-19 DIAGNOSIS — K09.8 MUCOUS CYST OF MOUTH: ICD-10-CM

## 2019-07-19 DIAGNOSIS — E11.69 DYSLIPIDEMIA ASSOCIATED WITH TYPE 2 DIABETES MELLITUS: Chronic | ICD-10-CM

## 2019-07-19 DIAGNOSIS — I10 BENIGN ESSENTIAL HYPERTENSION: Primary | Chronic | ICD-10-CM

## 2019-07-19 DIAGNOSIS — Z12.5 SCREENING PSA (PROSTATE SPECIFIC ANTIGEN): ICD-10-CM

## 2019-07-19 PROCEDURE — 3077F SYST BP >= 140 MM HG: CPT | Mod: CPTII,S$GLB,, | Performed by: FAMILY MEDICINE

## 2019-07-19 PROCEDURE — 3080F PR MOST RECENT DIASTOLIC BLOOD PRESSURE >= 90 MM HG: ICD-10-PCS | Mod: CPTII,S$GLB,, | Performed by: FAMILY MEDICINE

## 2019-07-19 PROCEDURE — 3008F BODY MASS INDEX DOCD: CPT | Mod: CPTII,S$GLB,, | Performed by: FAMILY MEDICINE

## 2019-07-19 PROCEDURE — 99999 PR PBB SHADOW E&M-EST. PATIENT-LVL IV: CPT | Mod: PBBFAC,,, | Performed by: FAMILY MEDICINE

## 2019-07-19 PROCEDURE — 3077F PR MOST RECENT SYSTOLIC BLOOD PRESSURE >= 140 MM HG: ICD-10-PCS | Mod: CPTII,S$GLB,, | Performed by: FAMILY MEDICINE

## 2019-07-19 PROCEDURE — 99999 PR PBB SHADOW E&M-EST. PATIENT-LVL IV: ICD-10-PCS | Mod: PBBFAC,,, | Performed by: FAMILY MEDICINE

## 2019-07-19 PROCEDURE — 3080F DIAST BP >= 90 MM HG: CPT | Mod: CPTII,S$GLB,, | Performed by: FAMILY MEDICINE

## 2019-07-19 PROCEDURE — 99214 PR OFFICE/OUTPT VISIT, EST, LEVL IV, 30-39 MIN: ICD-10-PCS | Mod: S$GLB,,, | Performed by: FAMILY MEDICINE

## 2019-07-19 PROCEDURE — 3045F PR MOST RECENT HEMOGLOBIN A1C LEVEL 7.0-9.0%: CPT | Mod: CPTII,S$GLB,, | Performed by: FAMILY MEDICINE

## 2019-07-19 PROCEDURE — 3008F PR BODY MASS INDEX (BMI) DOCUMENTED: ICD-10-PCS | Mod: CPTII,S$GLB,, | Performed by: FAMILY MEDICINE

## 2019-07-19 PROCEDURE — 99214 OFFICE O/P EST MOD 30 MIN: CPT | Mod: S$GLB,,, | Performed by: FAMILY MEDICINE

## 2019-07-19 PROCEDURE — 3045F PR MOST RECENT HEMOGLOBIN A1C LEVEL 7.0-9.0%: ICD-10-PCS | Mod: CPTII,S$GLB,, | Performed by: FAMILY MEDICINE

## 2019-07-19 RX ORDER — AMLODIPINE AND VALSARTAN 5; 320 MG/1; MG/1
1 TABLET ORAL DAILY
Qty: 30 TABLET | Refills: 11 | Status: SHIPPED | OUTPATIENT
Start: 2019-07-19 | End: 2019-08-08 | Stop reason: ALTCHOICE

## 2019-08-01 ENCOUNTER — PATIENT OUTREACH (OUTPATIENT)
Dept: ADMINISTRATIVE | Facility: HOSPITAL | Age: 59
End: 2019-08-01

## 2019-08-02 ENCOUNTER — LAB VISIT (OUTPATIENT)
Dept: LAB | Facility: HOSPITAL | Age: 59
End: 2019-08-02
Attending: FAMILY MEDICINE
Payer: COMMERCIAL

## 2019-08-02 DIAGNOSIS — E11.69 DYSLIPIDEMIA ASSOCIATED WITH TYPE 2 DIABETES MELLITUS: Chronic | ICD-10-CM

## 2019-08-02 DIAGNOSIS — I10 BENIGN ESSENTIAL HYPERTENSION: Chronic | ICD-10-CM

## 2019-08-02 DIAGNOSIS — E11.69 TYPE 2 DIABETES MELLITUS WITH OTHER SPECIFIED COMPLICATION, WITHOUT LONG-TERM CURRENT USE OF INSULIN: ICD-10-CM

## 2019-08-02 DIAGNOSIS — Z12.5 SCREENING PSA (PROSTATE SPECIFIC ANTIGEN): ICD-10-CM

## 2019-08-02 DIAGNOSIS — E78.5 DYSLIPIDEMIA ASSOCIATED WITH TYPE 2 DIABETES MELLITUS: Chronic | ICD-10-CM

## 2019-08-02 LAB
ALBUMIN SERPL BCP-MCNC: 4 G/DL (ref 3.5–5.2)
ALP SERPL-CCNC: 74 U/L (ref 55–135)
ALT SERPL W/O P-5'-P-CCNC: 48 U/L (ref 10–44)
ANION GAP SERPL CALC-SCNC: 7 MMOL/L (ref 8–16)
AST SERPL-CCNC: 33 U/L (ref 10–40)
BILIRUB SERPL-MCNC: 0.8 MG/DL (ref 0.1–1)
BUN SERPL-MCNC: 15 MG/DL (ref 6–20)
CALCIUM SERPL-MCNC: 9 MG/DL (ref 8.7–10.5)
CHLORIDE SERPL-SCNC: 103 MMOL/L (ref 95–110)
CO2 SERPL-SCNC: 28 MMOL/L (ref 23–29)
COMPLEXED PSA SERPL-MCNC: 1.4 NG/ML (ref 0–4)
CREAT SERPL-MCNC: 1.3 MG/DL (ref 0.5–1.4)
EST. GFR  (AFRICAN AMERICAN): >60 ML/MIN/1.73 M^2
EST. GFR  (NON AFRICAN AMERICAN): >60 ML/MIN/1.73 M^2
GLUCOSE SERPL-MCNC: 181 MG/DL (ref 70–110)
POTASSIUM SERPL-SCNC: 4.7 MMOL/L (ref 3.5–5.1)
PROT SERPL-MCNC: 7.3 G/DL (ref 6–8.4)
SODIUM SERPL-SCNC: 138 MMOL/L (ref 136–145)

## 2019-08-02 PROCEDURE — 80053 COMPREHEN METABOLIC PANEL: CPT

## 2019-08-02 PROCEDURE — 84153 ASSAY OF PSA TOTAL: CPT

## 2019-08-04 NOTE — PROGRESS NOTES
"Shelton Gamble.    Your hemoglobin A1c ("A1c" - a test used to evaluate for diabetes) is greater than 9, which means that your DIABETES IS UNCONTROLLED, putting you at increased risk for problems such as heart disease, stroke, kidney failure, and blindness. I look forward to seeing you at your next appointment (8/8/2019  At 9:40 AM) so we can discuss treatment options to lower your risk for these problems.    Want to learn more about your test results and what they mean? It's as simple as 1, 2, 3.     (1) Log in to your [facility] account at [email or web address]     (2) From the "View test results" tab, click on the test you want to know more about.     (3) Click on the "About This Test" link.    If you have any additional questions about your test results, be sure to write them down and we can discuss them face-to-face at your next appointment.  If you have any urgent questions in the meantime, please send me a message using [facility], or you can call my office at [phone].    Thanks for letting me care for you, thanks for trusting us with your healthcare needs, and thanks for using [facility].    Sincerely,    PERRI Galvan MD"

## 2019-08-04 NOTE — PROGRESS NOTES
CHIEF COMPLAINT  Mass (in bottom lip) and Hypertension      HISTORY, ASSESSMENT, and PLAN    1. Benign essential hypertension    2. Type 2 diabetes mellitus with other specified complication, without long-term current use of insulin    3. Dyslipidemia associated with type 2 diabetes mellitus    4. Mucous cyst of mouth    5. Screening PSA (prostate specific antigen)      Hypertension is asymptomatic but uncontrolled.    Most recent hemoglobin A1C reflects sub-optimal control of diabetes. This condition continues to be not well controlled based on his verbal report of sporadic home glycemic readings.    We discussed benefits of statin therapy for diabetes. He is adverse to initiating that treatment at present. It was agreed to discuss further at future appointments.    He has painless uncomplicated mucous cyst of his lower lip, onset over the last few days. I told him to expect spontaneous resolution, and I instructed him to let me know if this were not the case.  Orders Placed This Encounter    Comprehensive metabolic panel    PSA, Screening    Diabetes Digital Medicine (DDMP) Enrollment Order    Diabetes Digital Medicine (DDMP) Enrollment Order    amlodipine-valsartan (EXFORGE) 5-320 mg per tablet       Problem List Items Addressed This Visit        Cardiac/Vascular    Benign essential hypertension - Primary (Chronic)    Relevant Medications    amlodipine-valsartan (EXFORGE) 5-320 mg per tablet    Other Relevant Orders    Comprehensive metabolic panel (Completed)    Dyslipidemia associated with type 2 diabetes mellitus (Chronic)    Relevant Orders    Comprehensive metabolic panel (Completed)       Endocrine    Type 2 diabetes mellitus with other specified complication    Relevant Orders    Diabetes Digital Medicine (DDMP) Enrollment Order (Completed)    Diabetes Digital Medicine (DDMP) Enrollment Order (Completed)    Comprehensive metabolic panel (Completed)      Other Visit Diagnoses     Mucous cyst of mouth         Screening PSA (prostate specific antigen)        Relevant Orders    PSA, Screening (Completed)           Outpatient Medications Prior to Visit   Medication Sig Dispense Refill    allopurinol (ZYLOPRIM) 300 MG tablet Take 1 tablet (300 mg total) by mouth once daily. (FOR GOUT PREVENTION) 90 tablet 3    metFORMIN (GLUCOPHAGE-XR) 500 MG 24 hr tablet Take 1 tablet (500 mg total) by mouth 2 (two) times daily with meals. 180 tablet 1    methocarbamol (ROBAXIN) 500 MG Tab Take 1 tablet (500 mg total) by mouth 2 (two) times daily as needed (muscle spasm). - DISCUSS MORE REFILLS AT APPOINTMENT 10AM FRI, 11/16/18 10 tablet 0    montelukast (SINGULAIR) 10 mg tablet Take 1 tablet (10 mg total) by mouth every evening. 90 tablet 3    losartan (COZAAR) 100 MG tablet Take 1 tablet (100 mg total) by mouth once daily. 30 tablet 1    ALPRAZolam (XANAX) 0.5 MG tablet Take 0.5-1 tablets (0.25-0.5 mg total) by mouth 2 (two) times daily as needed for Anxiety. - APPOINTMENT REQUIRED FOR MORE REFILLS 60 tablet 0    ASCORBATE CALCIUM (VITAMIN C ORAL) Take by mouth every other day.      atorvastatin (LIPITOR) 20 MG tablet Take 1 tablet (20 mg total) by mouth every evening. - FOR HEART HEALTH 90 tablet 3    colchicine-probenecid 0.5-500 mg (CO-BENEMID) 500-0.5 mg Tab Take 1 tablet by mouth once daily. (Patient taking differently: Take 1 tablet by mouth as needed. ) 20 tablet 1    indomethacin (INDOCIN) 25 MG capsule Take 1 capsule (25 mg total) by mouth 3 (three) times daily as needed (ACUTE GOUT). - APPOINTMENT REQUIRED FOR MORE REFILLS 30 capsule 0    LACTOBAC NO.41/BIFIDOBACT NO.7 (PROBIOTIC-10 ORAL) Take by mouth.      MITIGARE 0.6 mg Cap Take 1 capsule by mouth.       No facility-administered medications prior to visit.         Medications Ordered This Encounter   Medications    amlodipine-valsartan (EXFORGE) 5-320 mg per tablet     Sig: Take 1 tablet by mouth once daily.     Dispense:  30 tablet     Refill:  11      "IMPORTANT!  This REPLACES losartan. DISCONTINUE any existing prescription for losartan. MAY DISPENSE IN 90-DAY QUANTITIES if insurance allows and/or if patient requests.       Medications Discontinued During This Encounter   Medication Reason    losartan (COZAAR) 100 MG tablet Alternate therapy        Follow up in about 25 days (around 8/13/2019) for review test results and discuss treatment plan, re-evaluate problem(s) discussed today.    REVIEW OF SYSTEMS  ENDOCRINE: No polyuria or polydipsia reported.   ENT: No otalgia or sore throat reported.     PHYSICAL EXAM  Vitals:    07/19/19 1614   BP: (!) 162/96   BP Location: Right arm   Patient Position: Sitting   Pulse: 70   Temp: 97.7 °F (36.5 °C)   TempSrc: Tympanic   SpO2: 97%   Weight: 97.1 kg (214 lb 1.1 oz)   Height: 5' 9" (1.753 m)     CONSTITUTIONAL: Vital signs noted. No apparent distress. Does not appear acutely ill or septic. Appears adequately hydrated.  HEENT: External ENT grossly unremarkable. Hearing grossly intact. Oropharynx moist. Uncomplicated mucous cyst about the size of a small cashew noted on his lower lip.  PULM: Lungs clear. Breathing unlabored.  HEART: Auscultation reveals regular rate and rhythm without murmur, gallop or rub.  DERM: Skin warm and moist with normal turgor.  NEURO: There are no gross focal motor deficits or gross deficits of cranial nerves III-XII.  PSYCHIATRIC: Alert and conversant. Mood grossly neutral. Judgment and insight not grossly compromised.     Documentation entered by me for this encounter may have been done in part using speech-recognition technology. Although I have made an effort to ensure accuracy, "sound like" errors may exist and should be interpreted in context. -PERRI Galvan MD.   "

## 2019-08-08 ENCOUNTER — OFFICE VISIT (OUTPATIENT)
Dept: INTERNAL MEDICINE | Facility: CLINIC | Age: 59
End: 2019-08-08
Payer: COMMERCIAL

## 2019-08-08 VITALS
OXYGEN SATURATION: 98 % | DIASTOLIC BLOOD PRESSURE: 78 MMHG | WEIGHT: 215.38 LBS | HEART RATE: 73 BPM | HEIGHT: 69 IN | SYSTOLIC BLOOD PRESSURE: 116 MMHG | BODY MASS INDEX: 31.9 KG/M2 | TEMPERATURE: 97 F

## 2019-08-08 DIAGNOSIS — I10 BENIGN ESSENTIAL HYPERTENSION: Chronic | ICD-10-CM

## 2019-08-08 DIAGNOSIS — E11.69 DYSLIPIDEMIA ASSOCIATED WITH TYPE 2 DIABETES MELLITUS: Chronic | ICD-10-CM

## 2019-08-08 DIAGNOSIS — E78.5 DYSLIPIDEMIA ASSOCIATED WITH TYPE 2 DIABETES MELLITUS: Chronic | ICD-10-CM

## 2019-08-08 DIAGNOSIS — E11.65 TYPE 2 DIABETES MELLITUS WITH HYPERGLYCEMIA, WITHOUT LONG-TERM CURRENT USE OF INSULIN: Primary | Chronic | ICD-10-CM

## 2019-08-08 PROCEDURE — 3008F PR BODY MASS INDEX (BMI) DOCUMENTED: ICD-10-PCS | Mod: CPTII,S$GLB,, | Performed by: FAMILY MEDICINE

## 2019-08-08 PROCEDURE — 3078F PR MOST RECENT DIASTOLIC BLOOD PRESSURE < 80 MM HG: ICD-10-PCS | Mod: CPTII,S$GLB,, | Performed by: FAMILY MEDICINE

## 2019-08-08 PROCEDURE — 3078F DIAST BP <80 MM HG: CPT | Mod: CPTII,S$GLB,, | Performed by: FAMILY MEDICINE

## 2019-08-08 PROCEDURE — 3008F BODY MASS INDEX DOCD: CPT | Mod: CPTII,S$GLB,, | Performed by: FAMILY MEDICINE

## 2019-08-08 PROCEDURE — 99999 PR PBB SHADOW E&M-EST. PATIENT-LVL IV: ICD-10-PCS | Mod: PBBFAC,,, | Performed by: FAMILY MEDICINE

## 2019-08-08 PROCEDURE — 3074F PR MOST RECENT SYSTOLIC BLOOD PRESSURE < 130 MM HG: ICD-10-PCS | Mod: CPTII,S$GLB,, | Performed by: FAMILY MEDICINE

## 2019-08-08 PROCEDURE — 99214 OFFICE O/P EST MOD 30 MIN: CPT | Mod: S$GLB,,, | Performed by: FAMILY MEDICINE

## 2019-08-08 PROCEDURE — 99214 PR OFFICE/OUTPT VISIT, EST, LEVL IV, 30-39 MIN: ICD-10-PCS | Mod: S$GLB,,, | Performed by: FAMILY MEDICINE

## 2019-08-08 PROCEDURE — 3046F PR MOST RECENT HEMOGLOBIN A1C LEVEL > 9.0%: ICD-10-PCS | Mod: CPTII,S$GLB,, | Performed by: FAMILY MEDICINE

## 2019-08-08 PROCEDURE — 99999 PR PBB SHADOW E&M-EST. PATIENT-LVL IV: CPT | Mod: PBBFAC,,, | Performed by: FAMILY MEDICINE

## 2019-08-08 PROCEDURE — 3074F SYST BP LT 130 MM HG: CPT | Mod: CPTII,S$GLB,, | Performed by: FAMILY MEDICINE

## 2019-08-08 PROCEDURE — 3046F HEMOGLOBIN A1C LEVEL >9.0%: CPT | Mod: CPTII,S$GLB,, | Performed by: FAMILY MEDICINE

## 2019-08-08 RX ORDER — AMLODIPINE BESYLATE 10 MG/1
10 TABLET ORAL DAILY
Qty: 90 TABLET | Refills: 3 | Status: SHIPPED | OUTPATIENT
Start: 2019-08-08 | End: 2020-03-05 | Stop reason: SDUPTHER

## 2019-08-08 RX ORDER — METFORMIN HYDROCHLORIDE 500 MG/1
1000 TABLET, EXTENDED RELEASE ORAL 2 TIMES DAILY WITH MEALS
Qty: 360 TABLET | Refills: 3 | Status: SHIPPED | OUTPATIENT
Start: 2019-08-08 | End: 2020-03-05 | Stop reason: SDUPTHER

## 2019-08-08 RX ORDER — LOSARTAN POTASSIUM AND HYDROCHLOROTHIAZIDE 25; 100 MG/1; MG/1
1 TABLET ORAL DAILY
Qty: 90 TABLET | Refills: 3 | Status: SHIPPED | OUTPATIENT
Start: 2019-08-08 | End: 2020-03-05 | Stop reason: SDUPTHER

## 2019-08-08 RX ORDER — ATORVASTATIN CALCIUM 80 MG/1
80 TABLET, FILM COATED ORAL DAILY
Qty: 90 TABLET | Refills: 3 | Status: SHIPPED | OUTPATIENT
Start: 2019-08-08 | End: 2020-03-19 | Stop reason: SDUPTHER

## 2019-08-08 NOTE — ASSESSMENT & PLAN NOTE
Lab Results   Component Value Date    CHOL 194 05/03/2019    CHOL 190 11/08/2018    TRIG 151 (H) 05/03/2019    TRIG 153 (H) 11/08/2018    HDL 30 (L) 05/03/2019    HDL 34 (L) 11/08/2018    LDLCALC 133.8 05/03/2019    LDLCALC 125.4 11/08/2018    NONHDLCHOL 164 05/03/2019    NONHDLCHOL 156 11/08/2018    AST 33 08/02/2019    ALT 48 (H) 08/02/2019     Wt Readings from Last 2 Encounters:   08/08/19 97.7 kg (215 lb 6.2 oz)   07/19/19 97.1 kg (214 lb 1.1 oz)     Body mass index is 31.81 kg/m².  The 10-year ASCVD risk score (Giladeo BABCOCK Jr., et al., 2013) is: 17.7%    Values used to calculate the score:      Age: 59 years      Sex: Male      Is Non- : No      Diabetic: Yes      Tobacco smoker: No      Systolic Blood Pressure: 116 mmHg      Is BP treated: Yes      HDL Cholesterol: 30 mg/dL      Total Cholesterol: 194 mg/dL    Tolerating statin, but LDL not at goal.  It was agreed to increased dose of Lipitor.

## 2019-08-08 NOTE — PROGRESS NOTES
CHIEF COMPLAINT  Follow-up (Diabetes and Hypertension)      HISTORY, ASSESSMENT, and PLAN    1. Type 2 diabetes mellitus with hyperglycemia, without long-term current use of insulin    2. Dyslipidemia associated with type 2 diabetes mellitus    3. Benign essential hypertension        Orders Placed This Encounter    Hemoglobin A1c    Diabetes Digital Medicine (DDMP) Enrollment Order    Hypertension Digital Medicine (HDMP) Enrollment Order    empagliflozin (JARDIANCE) 10 mg Tab    metFORMIN (GLUCOPHAGE-XR) 500 MG 24 hr tablet    atorvastatin (LIPITOR) 80 MG tablet    amLODIPine (NORVASC) 10 MG tablet    losartan-hydrochlorothiazide 100-25 mg (HYZAAR) 100-25 mg per tablet       Problem List Items Addressed This Visit        Cardiac/Vascular    Benign essential hypertension (Chronic)    Current Assessment & Plan     Although blood pressure reading in office today is good, most all of his home blood pressure readings reflect that his blood pressure is not well controlled.  He is tolerating his current medications well without adverse effect.  We discussed treatment options.  It was agreed to make medication changes as ordered.  He will work on therapeutic lifestyle changes.         Relevant Medications    amLODIPine (NORVASC) 10 MG tablet    losartan-hydrochlorothiazide 100-25 mg (HYZAAR) 100-25 mg per tablet    Other Relevant Orders    Hypertension Digital Medicine (HDMP) Enrollment Order (Completed)    Dyslipidemia associated with type 2 diabetes mellitus (Chronic)    Current Assessment & Plan     Lab Results   Component Value Date    CHOL 194 05/03/2019    CHOL 190 11/08/2018    TRIG 151 (H) 05/03/2019    TRIG 153 (H) 11/08/2018    HDL 30 (L) 05/03/2019    HDL 34 (L) 11/08/2018    LDLCALC 133.8 05/03/2019    LDLCALC 125.4 11/08/2018    NONHDLCHOL 164 05/03/2019    NONHDLCHOL 156 11/08/2018    AST 33 08/02/2019    ALT 48 (H) 08/02/2019     Wt Readings from Last 2 Encounters:   08/08/19 97.7 kg (215 lb 6.2 oz)  "  07/19/19 97.1 kg (214 lb 1.1 oz)     Body mass index is 31.81 kg/m².  The 10-year ASCVD risk score (Giladeo BABCOCK Jr., et al., 2013) is: 17.7%    Values used to calculate the score:      Age: 59 years      Sex: Male      Is Non- : No      Diabetic: Yes      Tobacco smoker: No      Systolic Blood Pressure: 116 mmHg      Is BP treated: Yes      HDL Cholesterol: 30 mg/dL      Total Cholesterol: 194 mg/dL    Tolerating statin, but LDL not at goal.  It was agreed to increased dose of Lipitor.         Relevant Medications    metFORMIN (GLUCOPHAGE-XR) 500 MG 24 hr tablet    atorvastatin (LIPITOR) 80 MG tablet       Endocrine    Type 2 diabetes mellitus with hyperglycemia, without long-term current use of insulin - Primary (Chronic)    Current Assessment & Plan     Lab Results   Component Value Date    HGBA1C 9.1 (H) 08/02/2019    HGBA1C 7.5 (H) 05/03/2019    HGBA1C 6.2 (H) 11/08/2018    ESTGFRAFRICA >60.0 08/02/2019    EGFRNONAA >60.0 08/02/2019    MICALBCREAT 6.8 11/08/2018    LDLCALC 133.8 05/03/2019   Uncontrolled.  Worse.  Exacerbating factors include dietary indiscretions and he increased alcohol consumption.  Therapeutic lifestyle changes encouraged.  We discussed treatment options.  He was previously prescribed SGLT2 inhibitor, but he never started. When asked why, he answered, "just because I'm stubborn."  He agreed to enroll in digital medicine diabetes program.         Relevant Medications    empagliflozin (JARDIANCE) 10 mg Tab    metFORMIN (GLUCOPHAGE-XR) 500 MG 24 hr tablet    Other Relevant Orders    Diabetes Digital Medicine (DDMP) Enrollment Order (Completed)    Hemoglobin A1c           Outpatient Medications Prior to Visit   Medication Sig Dispense Refill    allopurinol (ZYLOPRIM) 300 MG tablet Take 1 tablet (300 mg total) by mouth once daily. (FOR GOUT PREVENTION) 90 tablet 3    ASCORBATE CALCIUM (VITAMIN C ORAL) Take by mouth every other day.      LACTOBAC NO.41/BIFIDOBACT NO.7 " (PROBIOTIC-10 ORAL) Take by mouth.      montelukast (SINGULAIR) 10 mg tablet Take 1 tablet (10 mg total) by mouth every evening. 90 tablet 3    amlodipine-valsartan (EXFORGE) 5-320 mg per tablet Take 1 tablet by mouth once daily. 30 tablet 11    metFORMIN (GLUCOPHAGE-XR) 500 MG 24 hr tablet Take 1 tablet (500 mg total) by mouth 2 (two) times daily with meals. 180 tablet 1    ALPRAZolam (XANAX) 0.5 MG tablet Take 0.5-1 tablets (0.25-0.5 mg total) by mouth 2 (two) times daily as needed for Anxiety. - APPOINTMENT REQUIRED FOR MORE REFILLS 60 tablet 0    colchicine-probenecid 0.5-500 mg (CO-BENEMID) 500-0.5 mg Tab Take 1 tablet by mouth once daily. (Patient taking differently: Take 1 tablet by mouth as needed. ) 20 tablet 1    indomethacin (INDOCIN) 25 MG capsule Take 1 capsule (25 mg total) by mouth 3 (three) times daily as needed (ACUTE GOUT). - APPOINTMENT REQUIRED FOR MORE REFILLS 30 capsule 0    methocarbamol (ROBAXIN) 500 MG Tab Take 1 tablet (500 mg total) by mouth 2 (two) times daily as needed (muscle spasm). - DISCUSS MORE REFILLS AT APPOINTMENT 10AM FRI, 11/16/18 10 tablet 0    MITIGARE 0.6 mg Cap Take 1 capsule by mouth.      atorvastatin (LIPITOR) 20 MG tablet Take 1 tablet (20 mg total) by mouth every evening. - FOR HEART [facility] 90 tablet 3     No facility-administered medications prior to visit.         Medications Ordered This Encounter   Medications    amLODIPine (NORVASC) 10 MG tablet     Sig: Take 1 tablet (10 mg total) by mouth once daily.     Dispense:  90 tablet     Refill:  3     IMPORTANT!  This REPLACES Exforge. DISCONTINUE any existing prescription for Exforge.     atorvastatin (LIPITOR) 80 MG tablet     Sig: Take 1 tablet (80 mg total) by mouth once daily.     Dispense:  90 tablet     Refill:  3     NOTE DOSE CHANGE. This REPLACES any prior prescription for this drug. DISCONTINUE any prior prescription for this drug.    empagliflozin (JARDIANCE) 10 mg Tab     Sig: Take 10 mg by  "mouth once daily.     Dispense:  90 tablet     Refill:  1    losartan-hydrochlorothiazide 100-25 mg (HYZAAR) 100-25 mg per tablet     Sig: Take 1 tablet by mouth once daily.     Dispense:  90 tablet     Refill:  3     IMPORTANT!  This REPLACES Exforge. DISCONTINUE any existing prescription for Exforge.     metFORMIN (GLUCOPHAGE-XR) 500 MG 24 hr tablet     Sig: Take 2 tablets (1,000 mg total) by mouth 2 (two) times daily with meals.     Dispense:  360 tablet     Refill:  3       Medications Discontinued During This Encounter   Medication Reason    metFORMIN (GLUCOPHAGE-XR) 500 MG 24 hr tablet Dose adjustment    atorvastatin (LIPITOR) 20 MG tablet Dose adjustment    amlodipine-valsartan (EXFORGE) 5-320 mg per tablet Alternate therapy        Follow up in about 3 months (around 10/28/2019) for review test results and discuss treatment plan, re-evaluate problem(s) discussed today.    REVIEW OF SYSTEMS  CARDIOVASCULAR: No angina or orthopnea reported.   ENDOCRINE: No polyuria or polydipsia reported.     PHYSICAL EXAM  Vitals:    08/08/19 0926   BP: 116/78   BP Location: Left arm   Patient Position: Sitting   Pulse: 73   Temp: 97 °F (36.1 °C)   TempSrc: Tympanic   SpO2: 98%   Weight: 97.7 kg (215 lb 6.2 oz)   Height: 5' 9" (1.753 m)     CONSTITUTIONAL: Vital signs noted. No apparent distress. Does not appear acutely ill or septic. Appears adequately hydrated.  ENT: Oropharynx moist.  PULM: Breathing unlabored.  CV: Heart regular.  DERM: Skin normothermic.  PSYCHIATRIC: Alert and conversant. Grossly oriented. Mood is grossly neutral. Affect appropriate. Judgment and insight not grossly compromised.     Documentation entered by me for this encounter may have been done in part using speech-recognition technology. Although I have made an effort to ensure accuracy, "sound like" errors may exist and should be interpreted in context. -PERRI Galvan MD.   "

## 2019-08-08 NOTE — ASSESSMENT & PLAN NOTE
Although blood pressure reading in office today is good, most all of his home blood pressure readings reflect that his blood pressure is not well controlled.  He is tolerating his current medications well without adverse effect.  We discussed treatment options.  It was agreed to make medication changes as ordered.  He will work on therapeutic lifestyle changes.

## 2019-08-08 NOTE — ASSESSMENT & PLAN NOTE
"Lab Results   Component Value Date    HGBA1C 9.1 (H) 08/02/2019    HGBA1C 7.5 (H) 05/03/2019    HGBA1C 6.2 (H) 11/08/2018    ESTGFRAFRICA >60.0 08/02/2019    EGFRNONAA >60.0 08/02/2019    MICALBCREAT 6.8 11/08/2018    LDLCALC 133.8 05/03/2019   Uncontrolled.  Worse.  Exacerbating factors include dietary indiscretions and he increased alcohol consumption.  Therapeutic lifestyle changes encouraged.  We discussed treatment options.  He was previously prescribed SGLT2 inhibitor, but he never started. When asked why, he answered, "just because I'm stubborn."  He agreed to enroll in digital medicine diabetes program.  "

## 2019-08-13 ENCOUNTER — PATIENT OUTREACH (OUTPATIENT)
Dept: OTHER | Facility: OTHER | Age: 59
End: 2019-08-13

## 2019-08-13 NOTE — PROGRESS NOTES
"Last 5 Patient Entered Readings                                      Current 30 Day Average: 148/88     Recent Readings 8/5/2019 8/1/2019 7/23/2019 7/18/2019 7/15/2019    SBP (mmHg) 141 157 150 137 155    DBP (mmHg) 77 92 90 88 93    Pulse 79 65 80 69 73        Digital Medicine: Health  Introduction    Introduced Mr. Shelton Young to Digital Medicine. Discussed health  role and recommended lifestyle modifications.  Spoke to patient's wife about patient's habits and involvement in the program.    Lifestyle Assessment:  Current Dietary Habits(i.e. low sodium, food labels, dining out): Patient's wife states patient is not a reliable patient for this program. She states "he is a liar" and will not tell the truth about his habits and "sneaks food often." Patient's wife states he sneaks fast food and gas station food during his drive from [facility] for work. Patient's wife states patient usually drinks a big gulp that's 1/2 Dr. Pepper 1/2 Mountain Dew and "other gas station foods" while she is not around. Patient's wife has made major lifestyle adjustments and has lost 120lbs. She cooks healthy dinners and packs patient lunch and she states he "still sneaks food and lies."     Exercise: Patient works at the [employer] from 2am-12pm. Patient's wife states he used to be a  but "is now in the worst shape she has ever seen him."    Alcohol/Tobacco: Deferred    Medication Adherence: has been compliant with the medicaiton regimen. Patient's wife states he is "taking a hand-full of different pills" and is not correctly taking his BP readings. Patient works for the [employer] so he has an abnormal schedule and "bad habits."  Patient's wife states his lack of lifestyle changes is "causing major problems in their marriage" and providing a bad example for his young, sick children.   Patient's wife agreed to talk to Shelton about working with a HC and will encourage him to call back to discuss " minor, suitable lifestyle adjustments.     Reviewed AHA/AACE recommendations:  Limit sodium intake to <2000mg/day  Recommended CHO intake, 45-65% of daily caloric intake  Perform 150 minutes of physical activity per week    Reviewed the importance of self-monitoring, medication adherence, and that the health  can be used as a resource for lifestyle modifications to help reduce or maintain a healthy lifestyle.  Reviewed that the Digital Medicine team is not available for emergencies and instructed the patient to call 911 or [facility] ([phone] or [phone]) if one arises.

## 2019-08-13 NOTE — PROGRESS NOTES
"Last 5 Patient Entered Readings                                      Current 30 Day Average: 148/88     Recent Readings 8/5/2019 8/1/2019 7/23/2019 7/18/2019 7/15/2019    SBP (mmHg) 141 157 150 137 155    DBP (mmHg) 77 92 90 88 93    Pulse 79 65 80 69 73        Hypertension Medications     amLODIPine (NORVASC) 10 MG tablet Take 1 tablet (10 mg total) by mouth once daily.    losartan-hydrochlorothiazide 100-25 mg (HYZAAR) 100-25 mg per tablet Take 1 tablet by mouth once daily.     Called patient for digital medicine clinical pharmacist introduction. I spoke with Ms. Young. She says the patient is at work right now. She says the patient "hates it [digital medicine program] and she doesn't think he'll want to stay in". I left a message for Mr. Young to call me back to discuss.     Bernadette Parrish, Pharm.D.  [facility] Clinical Pharmacist  [phone]        "

## 2019-08-20 ENCOUNTER — PATIENT OUTREACH (OUTPATIENT)
Dept: OTHER | Facility: OTHER | Age: 59
End: 2019-08-20

## 2019-08-20 NOTE — PROGRESS NOTES
Last 5 Patient Entered Readings                                      Current 30 Day Average: 149/86     Recent Readings 8/5/2019 8/1/2019 7/23/2019 7/18/2019 7/15/2019    SBP (mmHg) 141 157 150 137 155    DBP (mmHg) 77 92 90 88 93    Pulse 79 65 80 69 73        Hypertension Medications     amLODIPine (NORVASC) 10 MG tablet Take 1 tablet (10 mg total) by mouth once daily.    losartan-hydrochlorothiazide 100-25 mg (HYZAAR) 100-25 mg per tablet Take 1 tablet by mouth once daily.     Patient spoke with health  today and requested that he be removed from the digital medicine program. Patient will be removed from the program and digital medicine will no longer be responsible for digital BP readings.       Bernadette Parrish, Pharm.D.   Digital Medicine Clinical Pharmacist  [phone]

## 2019-08-20 NOTE — PROGRESS NOTES
Last 5 Patient Entered Readings                                      Current 30 Day Average: 149/86     Recent Readings 8/5/2019 8/1/2019 7/23/2019 7/18/2019 7/15/2019    SBP (mmHg) 141 157 150 137 155    DBP (mmHg) 77 92 90 88 93    Pulse 79 65 80 69 73      Spoke with patient's wife and she stated they returned the machine because Shelton is no longer interested in the Digital Medicine Program. Will drop patient from the program today.

## 2019-09-04 ENCOUNTER — PATIENT MESSAGE (OUTPATIENT)
Dept: INTERNAL MEDICINE | Facility: CLINIC | Age: 59
End: 2019-09-04

## 2019-09-18 ENCOUNTER — PATIENT MESSAGE (OUTPATIENT)
Dept: INTERNAL MEDICINE | Facility: CLINIC | Age: 59
End: 2019-09-18

## 2019-09-26 ENCOUNTER — TELEPHONE (OUTPATIENT)
Dept: INTERNAL MEDICINE | Facility: CLINIC | Age: 59
End: 2019-09-26

## 2019-10-03 ENCOUNTER — PATIENT OUTREACH (OUTPATIENT)
Dept: ADMINISTRATIVE | Facility: HOSPITAL | Age: 59
End: 2019-10-03

## 2019-10-03 ENCOUNTER — LAB VISIT (OUTPATIENT)
Dept: LAB | Facility: HOSPITAL | Age: 59
End: 2019-10-03
Attending: FAMILY MEDICINE
Payer: COMMERCIAL

## 2019-10-03 DIAGNOSIS — E11.65 TYPE 2 DIABETES MELLITUS WITH HYPERGLYCEMIA, WITHOUT LONG-TERM CURRENT USE OF INSULIN: Primary | Chronic | ICD-10-CM

## 2019-10-03 DIAGNOSIS — E11.65 TYPE 2 DIABETES MELLITUS WITH HYPERGLYCEMIA, WITHOUT LONG-TERM CURRENT USE OF INSULIN: Chronic | ICD-10-CM

## 2019-10-03 LAB
ESTIMATED AVG GLUCOSE: 252 MG/DL (ref 68–131)
HBA1C MFR BLD HPLC: 10.4 % (ref 4–5.6)

## 2019-10-03 PROCEDURE — 36415 COLL VENOUS BLD VENIPUNCTURE: CPT

## 2019-10-03 PROCEDURE — 83036 HEMOGLOBIN GLYCOSYLATED A1C: CPT

## 2019-10-03 NOTE — PROGRESS NOTES
Health maintenance reviewed.  Care Everywhere checked. Immunizations reviewed and reconciled.  Referral to DM education ordered [facility]. PREVISIT CHART AUDIT LETTER SENT VIA PATIENT PORTAL

## 2019-10-06 ENCOUNTER — PATIENT MESSAGE (OUTPATIENT)
Dept: INTERNAL MEDICINE | Facility: CLINIC | Age: 59
End: 2019-10-06

## 2019-10-08 NOTE — PROGRESS NOTES
"Shelton Gamble.    Your hemoglobin A1c ("A1c" - a test used to evaluate for diabetes) is 10.4%, which means that your diabetes is uncontrolled and getting worse, putting you at increased risk for problems such as heart disease, stroke, kidney failure, and blindness.    Be sure to keep your appointment with me Thursday, October 10, 2019 at 10:40 AM so we can discuss treatment options to lower your risk for these problems.    Thanks for letting me care for you, thanks for trusting us with your healthcare needs, and thanks for using [facility].    Sincerely,    PERRI Galvan MD"

## 2019-10-18 ENCOUNTER — OFFICE VISIT (OUTPATIENT)
Dept: DIABETES | Facility: CLINIC | Age: 59
End: 2019-10-18
Payer: COMMERCIAL

## 2019-10-18 VITALS
DIASTOLIC BLOOD PRESSURE: 86 MMHG | HEIGHT: 70 IN | WEIGHT: 202.38 LBS | BODY MASS INDEX: 28.97 KG/M2 | SYSTOLIC BLOOD PRESSURE: 120 MMHG

## 2019-10-18 DIAGNOSIS — E11.65 TYPE 2 DIABETES MELLITUS WITH HYPERGLYCEMIA, WITHOUT LONG-TERM CURRENT USE OF INSULIN: Primary | Chronic | ICD-10-CM

## 2019-10-18 LAB — GLUCOSE SERPL-MCNC: 385 MG/DL (ref 70–110)

## 2019-10-18 PROCEDURE — 99214 PR OFFICE/OUTPT VISIT, EST, LEVL IV, 30-39 MIN: ICD-10-PCS | Mod: S$GLB,,, | Performed by: PHYSICIAN ASSISTANT

## 2019-10-18 PROCEDURE — 82962 GLUCOSE BLOOD TEST: CPT | Mod: S$GLB,,, | Performed by: PHYSICIAN ASSISTANT

## 2019-10-18 PROCEDURE — 3008F BODY MASS INDEX DOCD: CPT | Mod: CPTII,S$GLB,, | Performed by: PHYSICIAN ASSISTANT

## 2019-10-18 PROCEDURE — 3074F PR MOST RECENT SYSTOLIC BLOOD PRESSURE < 130 MM HG: ICD-10-PCS | Mod: CPTII,S$GLB,, | Performed by: PHYSICIAN ASSISTANT

## 2019-10-18 PROCEDURE — 99214 OFFICE O/P EST MOD 30 MIN: CPT | Mod: S$GLB,,, | Performed by: PHYSICIAN ASSISTANT

## 2019-10-18 PROCEDURE — 3079F DIAST BP 80-89 MM HG: CPT | Mod: CPTII,S$GLB,, | Performed by: PHYSICIAN ASSISTANT

## 2019-10-18 PROCEDURE — 99999 PR PBB SHADOW E&M-EST. PATIENT-LVL III: CPT | Mod: PBBFAC,,, | Performed by: PHYSICIAN ASSISTANT

## 2019-10-18 PROCEDURE — 99999 PR PBB SHADOW E&M-EST. PATIENT-LVL III: ICD-10-PCS | Mod: PBBFAC,,, | Performed by: PHYSICIAN ASSISTANT

## 2019-10-18 PROCEDURE — 3008F PR BODY MASS INDEX (BMI) DOCUMENTED: ICD-10-PCS | Mod: CPTII,S$GLB,, | Performed by: PHYSICIAN ASSISTANT

## 2019-10-18 PROCEDURE — 3046F PR MOST RECENT HEMOGLOBIN A1C LEVEL > 9.0%: ICD-10-PCS | Mod: CPTII,S$GLB,, | Performed by: PHYSICIAN ASSISTANT

## 2019-10-18 PROCEDURE — 3079F PR MOST RECENT DIASTOLIC BLOOD PRESSURE 80-89 MM HG: ICD-10-PCS | Mod: CPTII,S$GLB,, | Performed by: PHYSICIAN ASSISTANT

## 2019-10-18 PROCEDURE — 3074F SYST BP LT 130 MM HG: CPT | Mod: CPTII,S$GLB,, | Performed by: PHYSICIAN ASSISTANT

## 2019-10-18 PROCEDURE — 82962 POCT GLUCOSE, HAND-HELD DEVICE: ICD-10-PCS | Mod: S$GLB,,, | Performed by: PHYSICIAN ASSISTANT

## 2019-10-18 PROCEDURE — 3046F HEMOGLOBIN A1C LEVEL >9.0%: CPT | Mod: CPTII,S$GLB,, | Performed by: PHYSICIAN ASSISTANT

## 2019-10-18 RX ORDER — GLIMEPIRIDE 4 MG/1
4 TABLET ORAL
Qty: 90 TABLET | Refills: 3 | Status: SHIPPED | OUTPATIENT
Start: 2019-10-18 | End: 2020-03-19 | Stop reason: SDUPTHER

## 2019-10-18 RX ORDER — INSULIN PUMP SYRINGE, 3 ML
EACH MISCELLANEOUS
Qty: 1 EACH | Refills: 0 | Status: SHIPPED | OUTPATIENT
Start: 2019-10-18 | End: 2020-08-26

## 2019-10-18 NOTE — PROGRESS NOTES
Subjective:      Patient ID: Shelton Young is a 59 y.o. male.    PCP: AUDREY Galvan MD      Shelton Young is a pleasant 59 y.o. male presenting to follow up on Type 2 diabetes mellitus.  Also, pertinent to this visit in decision making is hypertension, hyperlipidemia, and adherence.  He has had diabetes for 3 or more years.  His last visit in Diabetes Management was 3/27/2018 .   Since that time he has been in his usual state of health without significant hyperglycemia or hypoglycemia. He has not been checking his blood glucose regularly.  Also, since that time he has had significant regression in his glycemia with A1c of 10.4%.   He is currently on Metformin, Jardiance.  His current concerns are glycemic control.    He denies any hospital admissions, emergency room visits, hypoglycemia, syncope, diaphoresis, chest pain, or dyspnea.    He has maintained 187 pounds since last visit. His BMI is 29.46 kg/m².    His blood sugar in the clinic today was:   Lab Results   Component Value Date    POCGLU 385 (A) 10/18/2019     Shelton is compliant most of the time with DM medications.     Shelton is compliant most of the time with lifestyle modifications to include activity and meal planning.       Lab Results   Component Value Date    HGBA1C 10.4 (H) 10/03/2019    HGBA1C 9.1 (H) 08/02/2019    HGBA1C 7.5 (H) 05/03/2019       Lab Results   Component Value Date    GLUTAMICACID 0.00 06/28/2017    CPEPTIDE 2.75 06/28/2017       Review of Systems   Constitutional: Negative for activity change and unexpected weight change.   Eyes: Negative for visual disturbance.   Respiratory: Negative for chest tightness and shortness of breath.    Cardiovascular: Negative for chest pain and leg swelling.   Gastrointestinal: Negative for constipation and diarrhea.   Endocrine: Negative for cold intolerance, heat intolerance, polydipsia, polyphagia and polyuria.   Genitourinary: Negative for frequency.   Skin: Negative for wound.  "  Neurological: Negative for numbness.   Psychiatric/Behavioral: Negative for confusion.      Objective:   /86   Ht 5' 9.5" (1.765 m)   Wt 91.8 kg (202 lb 6.1 oz)   BMI 29.46 kg/m²       Physical Exam   Constitutional: He is oriented to person, place, and time. He appears well-developed and well-nourished. No distress.   Neck: Normal range of motion. Neck supple. No tracheal deviation present. No thyromegaly present.   Cardiovascular: Normal rate, regular rhythm and normal heart sounds.   Pulmonary/Chest: Effort normal and breath sounds normal.   Musculoskeletal: He exhibits no edema.   Lymphadenopathy:     He has no cervical adenopathy.   Neurological: He is alert and oriented to person, place, and time.   Skin: Skin is warm and dry. He is not diaphoretic.   Psychiatric: He has a normal mood and affect.     Assessment:     1. Type 2 diabetes mellitus with hyperglycemia, without long-term current use of insulin      Plan:   Shelton Young is seen today for   1. Type 2 diabetes mellitus with hyperglycemia, without long-term current use of insulin      Uncontrolled type 2 diabetes mellitus with hyperglycemia, without long-term current use of insulin  -     POCT glucose    Type 2 diabetes mellitus with hyperglycemia, without long-term current use of insulin  -     POCT Glucose, Hand-Held Device  -     blood sugar diagnostic Strp; 1 strip by Misc.(Non-Drug; Combo Route) route 4 (four) times daily with meals and nightly.  Dispense: 120 strip; Refill: 11  -     blood-glucose meter kit; Test finger stick blood sugar once daily.  Dispense: 1 each; Refill: 0  -     empagliflozin (JARDIANCE) 10 mg Tab; Take 10 mg by mouth once daily.  Dispense: 30 tablet; Refill: 11  -     glimepiride (AMARYL) 4 MG tablet; Take 1 tablet (4 mg total) by mouth before breakfast.  Dispense: 90 tablet; Refill: 3       - Continue with D&E, reduce portion size, and restrict carbohydrates (no more that 45 grams ) per meal.   - Needs " improvement will intensify therapy   - Follow up in 3 months      A total of 30 minutes was spent in face to face time, of which 50 % was spent in counseling patient on disease process, complications, treatment, and side effects of medications.    The patient was explained the above plan and given opportunity to ask questions. He understands, chooses and consents to this plan and accepts all the risks, which include but are not limited to the risks mentioned above. He understands the alternative of having no testing, interventions or treatments at this time. He left content and without further questions.     Disclaimer:  This note is prepared using voice recognition software and as such is likely to have errors and has not been proof read. Please contact me for questions.

## 2019-10-23 ENCOUNTER — TELEPHONE (OUTPATIENT)
Dept: DIABETES | Facility: CLINIC | Age: 59
End: 2019-10-23

## 2019-10-23 NOTE — TELEPHONE ENCOUNTER
----- Message from Pooja Connelly sent at 10/23/2019 10:29 AM CDT -----  Contact: [facility]  Type:  Pharmacy Calling to Clarify an RX    Name of Caller:nadia  Pharmacy Name:[facility]  Prescription Name:test strips, [facility]  What do they need to clarify?:quantity of supplies  Best Call Back Number:[phone]  Additional Information: reference number is [id number]    Pooja Zhang

## 2019-12-19 ENCOUNTER — PATIENT OUTREACH (OUTPATIENT)
Dept: ADMINISTRATIVE | Facility: HOSPITAL | Age: 59
End: 2019-12-19

## 2020-03-05 ENCOUNTER — TELEPHONE (OUTPATIENT)
Dept: INTERNAL MEDICINE | Facility: CLINIC | Age: 60
End: 2020-03-05

## 2020-03-05 DIAGNOSIS — E11.65 TYPE 2 DIABETES MELLITUS WITH HYPERGLYCEMIA, WITHOUT LONG-TERM CURRENT USE OF INSULIN: Chronic | ICD-10-CM

## 2020-03-05 DIAGNOSIS — I10 BENIGN ESSENTIAL HYPERTENSION: Chronic | ICD-10-CM

## 2020-03-05 NOTE — TELEPHONE ENCOUNTER
----- Message from Pooja Connelly sent at 3/5/2020  1:53 PM CST -----  Contact: self  Requesting call back regarding getting refill on all medications. States wife will not let him back in home he has been out of medications for the last two weeks. Please call back at  [phone].    Thanks,  Pooja Connelly

## 2020-03-06 ENCOUNTER — PATIENT MESSAGE (OUTPATIENT)
Dept: INTERNAL MEDICINE | Facility: CLINIC | Age: 60
End: 2020-03-06

## 2020-03-06 RX ORDER — METFORMIN HYDROCHLORIDE 500 MG/1
1000 TABLET, EXTENDED RELEASE ORAL 2 TIMES DAILY WITH MEALS
Qty: 120 TABLET | Refills: 0 | Status: SHIPPED | OUTPATIENT
Start: 2020-03-06 | End: 2020-03-19 | Stop reason: SDUPTHER

## 2020-03-06 RX ORDER — AMLODIPINE BESYLATE 10 MG/1
10 TABLET ORAL DAILY
Qty: 30 TABLET | Refills: 0 | Status: SHIPPED | OUTPATIENT
Start: 2020-03-06 | End: 2020-03-19 | Stop reason: SDUPTHER

## 2020-03-06 RX ORDER — LOSARTAN POTASSIUM AND HYDROCHLOROTHIAZIDE 25; 100 MG/1; MG/1
1 TABLET ORAL DAILY
Qty: 30 TABLET | Refills: 0 | Status: SHIPPED | OUTPATIENT
Start: 2020-03-06 | End: 2020-03-19 | Stop reason: SDUPTHER

## 2020-03-06 NOTE — TELEPHONE ENCOUNTER
ACTION NEEDED:  Please contact him to verify his receipt and understanding of my message (below) and to help him schedule his appointment with me. Thanks.  --------------------------------------------------------------------------------  --------------------------------------------------------------------------------   Shelton Gamble.    I received a request for refill for your medicine listed below. I approved a limited refill of your medicine.    IMPORTANT: Before I can give you any additional refills, I'm going to need to re-evaluate you with an office visit.    Please take the time right now to schedule your appointment with me ASAP and  within 3 weeks You can schedule your appointment from your [facility] account or from the [employer] leo on your smartphone or by calling our appointment desk at [phone]. If you have any difficulty, send my staff a message, and they will be glad to help.    I look forward to seeing you then.    Thanks for letting me care for you, thanks for trusting us with your healthcare needs, and thanks for using [facility].    Sincerely,     PERRI Galvan MD     Medications Ordered This Encounter   Medications    amLODIPine (NORVASC) 10 MG tablet     Sig: Take 1 tablet (10 mg total) by mouth once daily.     Dispense:  30 tablet     Refill:  0     Dispense only 30-day QTY. Do not request 90-day QTY. APPOINTMENT REQUIRED FOR MORE REFILLS    empagliflozin (JARDIANCE) 10 mg tablet     Sig: Take 1 tablet (10 mg total) by mouth once daily.     Dispense:  30 tablet     Refill:  0     Dispense only 30-day QTY. Do not request 90-day QTY. APPOINTMENT REQUIRED FOR MORE REFILLS    losartan-hydrochlorothiazide 100-25 mg (HYZAAR) 100-25 mg per tablet     Sig: Take 1 tablet by mouth once daily.     Dispense:  30 tablet     Refill:  0     Dispense only 30-day QTY. Do not request 90-day QTY. APPOINTMENT REQUIRED FOR MORE REFILLS    metFORMIN (GLUCOPHAGE-XR) 500 MG XR 24hr tablet     Sig: Take 2 tablets  (1,000 mg total) by mouth 2 (two) times daily with meals.     Dispense:  120 tablet     Refill:  0     Dispense only 30-day QTY. Do not request 90-day QTY. APPOINTMENT REQUIRED FOR MORE REFILLS

## 2020-03-07 NOTE — TELEPHONE ENCOUNTER
Spoke with patient and informed him of medication refills per Dr. Galvan and the need for an appointment with him in the next three weeks before any additional refills can be sent in. He verbalized understanding.

## 2020-03-10 ENCOUNTER — PATIENT OUTREACH (OUTPATIENT)
Dept: ADMINISTRATIVE | Facility: HOSPITAL | Age: 60
End: 2020-03-10

## 2020-03-19 ENCOUNTER — LAB VISIT (OUTPATIENT)
Dept: LAB | Facility: HOSPITAL | Age: 60
End: 2020-03-19
Payer: COMMERCIAL

## 2020-03-19 ENCOUNTER — OFFICE VISIT (OUTPATIENT)
Dept: INTERNAL MEDICINE | Facility: CLINIC | Age: 60
End: 2020-03-19
Payer: COMMERCIAL

## 2020-03-19 ENCOUNTER — LAB VISIT (OUTPATIENT)
Dept: LAB | Facility: HOSPITAL | Age: 60
End: 2020-03-19
Attending: FAMILY MEDICINE
Payer: COMMERCIAL

## 2020-03-19 ENCOUNTER — PATIENT MESSAGE (OUTPATIENT)
Dept: INTERNAL MEDICINE | Facility: CLINIC | Age: 60
End: 2020-03-19

## 2020-03-19 VITALS
BODY MASS INDEX: 29.94 KG/M2 | WEIGHT: 205.69 LBS | DIASTOLIC BLOOD PRESSURE: 86 MMHG | OXYGEN SATURATION: 97 % | TEMPERATURE: 98 F | SYSTOLIC BLOOD PRESSURE: 144 MMHG | HEART RATE: 72 BPM

## 2020-03-19 DIAGNOSIS — M10.071 ACUTE IDIOPATHIC GOUT OF RIGHT ANKLE: ICD-10-CM

## 2020-03-19 DIAGNOSIS — E11.65 TYPE 2 DIABETES MELLITUS WITH HYPERGLYCEMIA, WITHOUT LONG-TERM CURRENT USE OF INSULIN: ICD-10-CM

## 2020-03-19 DIAGNOSIS — E78.5 DYSLIPIDEMIA ASSOCIATED WITH TYPE 2 DIABETES MELLITUS: Primary | Chronic | ICD-10-CM

## 2020-03-19 DIAGNOSIS — F33.0 MILD EPISODE OF RECURRENT MAJOR DEPRESSIVE DISORDER: Chronic | ICD-10-CM

## 2020-03-19 DIAGNOSIS — M1A.09X1 IDIOPATHIC CHRONIC GOUT OF MULTIPLE SITES WITH TOPHUS: Chronic | ICD-10-CM

## 2020-03-19 DIAGNOSIS — E11.69 DYSLIPIDEMIA ASSOCIATED WITH TYPE 2 DIABETES MELLITUS: Chronic | ICD-10-CM

## 2020-03-19 DIAGNOSIS — E11.69 TYPE 2 DIABETES MELLITUS WITH OTHER SPECIFIED COMPLICATION, WITHOUT LONG-TERM CURRENT USE OF INSULIN: ICD-10-CM

## 2020-03-19 DIAGNOSIS — I10 BENIGN ESSENTIAL HYPERTENSION: Chronic | ICD-10-CM

## 2020-03-19 DIAGNOSIS — F41.9 ANXIETY: Chronic | ICD-10-CM

## 2020-03-19 DIAGNOSIS — J30.1 SEASONAL ALLERGIC RHINITIS DUE TO POLLEN: Chronic | ICD-10-CM

## 2020-03-19 DIAGNOSIS — E78.5 DYSLIPIDEMIA ASSOCIATED WITH TYPE 2 DIABETES MELLITUS: Chronic | ICD-10-CM

## 2020-03-19 DIAGNOSIS — R45.86 MOOD SWINGS: ICD-10-CM

## 2020-03-19 DIAGNOSIS — E11.69 DYSLIPIDEMIA ASSOCIATED WITH TYPE 2 DIABETES MELLITUS: Primary | Chronic | ICD-10-CM

## 2020-03-19 DIAGNOSIS — K76.0 NON-ALCOHOLIC FATTY LIVER DISEASE: Chronic | ICD-10-CM

## 2020-03-19 DIAGNOSIS — F41.1 GENERALIZED ANXIETY DISORDER: Chronic | ICD-10-CM

## 2020-03-19 DIAGNOSIS — Z23 NEED FOR SHINGLES VACCINE: ICD-10-CM

## 2020-03-19 DIAGNOSIS — Z23 NEED FOR 23-POLYVALENT PNEUMOCOCCAL POLYSACCHARIDE VACCINE: ICD-10-CM

## 2020-03-19 LAB
ALBUMIN SERPL BCP-MCNC: 4.4 G/DL (ref 3.5–5.2)
ALBUMIN/CREAT UR: 14.8 UG/MG (ref 0–30)
ALP SERPL-CCNC: 74 U/L (ref 55–135)
ALT SERPL W/O P-5'-P-CCNC: 38 U/L (ref 10–44)
ANION GAP SERPL CALC-SCNC: 9 MMOL/L (ref 8–16)
AST SERPL-CCNC: 32 U/L (ref 10–40)
BILIRUB SERPL-MCNC: 0.8 MG/DL (ref 0.1–1)
BUN SERPL-MCNC: 14 MG/DL (ref 6–20)
CALCIUM SERPL-MCNC: 9.3 MG/DL (ref 8.7–10.5)
CHLORIDE SERPL-SCNC: 101 MMOL/L (ref 95–110)
CHOLEST SERPL-MCNC: 182 MG/DL (ref 120–199)
CHOLEST/HDLC SERPL: 6.1 {RATIO} (ref 2–5)
CO2 SERPL-SCNC: 28 MMOL/L (ref 23–29)
CREAT SERPL-MCNC: 1.1 MG/DL (ref 0.5–1.4)
CREAT UR-MCNC: 128 MG/DL (ref 23–375)
EST. GFR  (AFRICAN AMERICAN): >60 ML/MIN/1.73 M^2
EST. GFR  (NON AFRICAN AMERICAN): >60 ML/MIN/1.73 M^2
ESTIMATED AVG GLUCOSE: 157 MG/DL (ref 68–131)
GLUCOSE SERPL-MCNC: 107 MG/DL (ref 70–110)
HBA1C MFR BLD HPLC: 7.1 % (ref 4–5.6)
HDLC SERPL-MCNC: 30 MG/DL (ref 40–75)
HDLC SERPL: 16.5 % (ref 20–50)
LDLC SERPL CALC-MCNC: 112.2 MG/DL (ref 63–159)
MICROALBUMIN UR DL<=1MG/L-MCNC: 19 UG/ML
NONHDLC SERPL-MCNC: 152 MG/DL
POTASSIUM SERPL-SCNC: 4.5 MMOL/L (ref 3.5–5.1)
PROT SERPL-MCNC: 8.4 G/DL (ref 6–8.4)
SODIUM SERPL-SCNC: 138 MMOL/L (ref 136–145)
TRIGL SERPL-MCNC: 199 MG/DL (ref 30–150)
URATE SERPL-MCNC: 6.1 MG/DL (ref 3.4–7)

## 2020-03-19 PROCEDURE — 80053 COMPREHEN METABOLIC PANEL: CPT

## 2020-03-19 PROCEDURE — 80061 LIPID PANEL: CPT

## 2020-03-19 PROCEDURE — 3008F BODY MASS INDEX DOCD: CPT | Mod: CPTII,S$GLB,, | Performed by: FAMILY MEDICINE

## 2020-03-19 PROCEDURE — 3077F PR MOST RECENT SYSTOLIC BLOOD PRESSURE >= 140 MM HG: ICD-10-PCS | Mod: CPTII,S$GLB,, | Performed by: FAMILY MEDICINE

## 2020-03-19 PROCEDURE — 84550 ASSAY OF BLOOD/URIC ACID: CPT

## 2020-03-19 PROCEDURE — 3046F PR MOST RECENT HEMOGLOBIN A1C LEVEL > 9.0%: ICD-10-PCS | Mod: CPTII,S$GLB,, | Performed by: FAMILY MEDICINE

## 2020-03-19 PROCEDURE — 99214 OFFICE O/P EST MOD 30 MIN: CPT | Mod: S$GLB,,, | Performed by: FAMILY MEDICINE

## 2020-03-19 PROCEDURE — 3046F HEMOGLOBIN A1C LEVEL >9.0%: CPT | Mod: CPTII,S$GLB,, | Performed by: FAMILY MEDICINE

## 2020-03-19 PROCEDURE — 82043 UR ALBUMIN QUANTITATIVE: CPT

## 2020-03-19 PROCEDURE — 3008F PR BODY MASS INDEX (BMI) DOCUMENTED: ICD-10-PCS | Mod: CPTII,S$GLB,, | Performed by: FAMILY MEDICINE

## 2020-03-19 PROCEDURE — 83036 HEMOGLOBIN GLYCOSYLATED A1C: CPT

## 2020-03-19 PROCEDURE — 99214 PR OFFICE/OUTPT VISIT, EST, LEVL IV, 30-39 MIN: ICD-10-PCS | Mod: S$GLB,,, | Performed by: FAMILY MEDICINE

## 2020-03-19 PROCEDURE — 3079F PR MOST RECENT DIASTOLIC BLOOD PRESSURE 80-89 MM HG: ICD-10-PCS | Mod: CPTII,S$GLB,, | Performed by: FAMILY MEDICINE

## 2020-03-19 PROCEDURE — 99999 PR PBB SHADOW E&M-EST. PATIENT-LVL III: ICD-10-PCS | Mod: PBBFAC,,, | Performed by: FAMILY MEDICINE

## 2020-03-19 PROCEDURE — 3077F SYST BP >= 140 MM HG: CPT | Mod: CPTII,S$GLB,, | Performed by: FAMILY MEDICINE

## 2020-03-19 PROCEDURE — 36415 COLL VENOUS BLD VENIPUNCTURE: CPT

## 2020-03-19 PROCEDURE — 3079F DIAST BP 80-89 MM HG: CPT | Mod: CPTII,S$GLB,, | Performed by: FAMILY MEDICINE

## 2020-03-19 PROCEDURE — 99999 PR PBB SHADOW E&M-EST. PATIENT-LVL III: CPT | Mod: PBBFAC,,, | Performed by: FAMILY MEDICINE

## 2020-03-19 RX ORDER — METFORMIN HYDROCHLORIDE 500 MG/1
1000 TABLET, EXTENDED RELEASE ORAL 2 TIMES DAILY WITH MEALS
Qty: 120 TABLET | Refills: 1 | Status: SHIPPED | OUTPATIENT
Start: 2020-03-19 | End: 2020-03-26 | Stop reason: SDUPTHER

## 2020-03-19 RX ORDER — SERTRALINE HYDROCHLORIDE 50 MG/1
50 TABLET, FILM COATED ORAL DAILY
Qty: 30 TABLET | Refills: 1 | Status: SHIPPED | OUTPATIENT
Start: 2020-03-19 | End: 2020-03-26 | Stop reason: SDUPTHER

## 2020-03-19 RX ORDER — ALPRAZOLAM 0.5 MG/1
.25-.5 TABLET ORAL 2 TIMES DAILY PRN
Qty: 60 TABLET | Refills: 0 | Status: SHIPPED | OUTPATIENT
Start: 2020-03-19 | End: 2020-08-26

## 2020-03-19 RX ORDER — MONTELUKAST SODIUM 10 MG/1
10 TABLET ORAL NIGHTLY
Qty: 90 TABLET | Refills: 4 | Status: SHIPPED | OUTPATIENT
Start: 2020-03-19 | End: 2021-03-19

## 2020-03-19 RX ORDER — INDOMETHACIN 25 MG/1
25 CAPSULE ORAL 3 TIMES DAILY PRN
Qty: 60 CAPSULE | Refills: 0 | Status: SHIPPED | OUTPATIENT
Start: 2020-03-19 | End: 2020-08-26 | Stop reason: SDUPTHER

## 2020-03-19 RX ORDER — GLIMEPIRIDE 4 MG/1
4 TABLET ORAL
Qty: 90 TABLET | Refills: 4 | Status: SHIPPED | OUTPATIENT
Start: 2020-03-19 | End: 2021-08-16 | Stop reason: ALTCHOICE

## 2020-03-19 RX ORDER — LOSARTAN POTASSIUM AND HYDROCHLOROTHIAZIDE 25; 100 MG/1; MG/1
1 TABLET ORAL DAILY
Qty: 90 TABLET | Refills: 4 | Status: SHIPPED | OUTPATIENT
Start: 2020-03-19 | End: 2021-08-16 | Stop reason: SDUPTHER

## 2020-03-19 RX ORDER — COLCHICINE 0.6 MG/1
CAPSULE ORAL
Qty: 90 CAPSULE | Refills: 1 | Status: SHIPPED | OUTPATIENT
Start: 2020-03-19 | End: 2020-08-26 | Stop reason: SDUPTHER

## 2020-03-19 RX ORDER — ATORVASTATIN CALCIUM 80 MG/1
80 TABLET, FILM COATED ORAL DAILY
Qty: 90 TABLET | Refills: 4 | Status: SHIPPED | OUTPATIENT
Start: 2020-03-19 | End: 2020-08-26 | Stop reason: ALTCHOICE

## 2020-03-19 RX ORDER — AMLODIPINE BESYLATE 10 MG/1
10 TABLET ORAL DAILY
Qty: 90 TABLET | Refills: 4 | Status: SHIPPED | OUTPATIENT
Start: 2020-03-19 | End: 2021-08-16 | Stop reason: SDUPTHER

## 2020-03-19 NOTE — ASSESSMENT & PLAN NOTE
BP Readings from Last 6 Encounters:   03/19/20 (!) 144/86   10/18/19 120/86   08/08/19 116/78   07/19/19 (!) 162/96   05/27/19 (!) 158/92   05/03/19 (!) 146/94     Asymptomatic, uncontrolled.

## 2020-03-19 NOTE — PROGRESS NOTES
CHIEF COMPLAINT  Follow-up      DIAGNOSES, HISTORY, ASSESSMENT, AND PLAN    1. Dyslipidemia associated with type 2 diabetes mellitus    2. Type 2 diabetes mellitus with hyperglycemia, without long-term current use of insulin    3. Type 2 diabetes mellitus with other specified complication, without long-term current use of insulin    4. Generalized anxiety disorder    5. Mild episode of recurrent major depressive disorder    6. Mood swings    7. Benign essential hypertension    8. Non-alcoholic fatty liver disease    9. Idiopathic chronic gout of multiple sites with tophus    10. Seasonal allergic rhinitis due to pollen    11. Need for 23-polyvalent pneumococcal polysaccharide vaccine    12. Acute idiopathic gout of right ankle    13. Need for shingles vaccine    14. Anxiety      He says that he has been out of his medicines for the last few weeks, only refilling some of them 2-3 days ago, which explains his uncontrolled hypertension, apparently uncontrolled diabetes, and acute gout of his right ankle.  He is seeing a mental health counselor for his depression and anxiety, exacerbated by recent life stressors.  He reports no suicidal thoughts.  He reports no hypomania symptoms, although he does report mood swings.  He is delinquent for many aspects of his health maintenance and labs to monitor his chronic conditions.  We discussed strategies to help him better adhere to his plan of care.    TOTAL TIME evaluating and managing this patient for this encounter exceeded 25 minutes, the majority spent counseling and coordinating care for the listed diagnoses.     Problem List Items Addressed This Visit        Psychiatric    Mood swings    Mild episode of recurrent major depressive disorder (Chronic)    Overview     COUNSELOR: Suzi Guerrero MA         Relevant Medications    sertraline (ZOLOFT) 50 MG tablet    Generalized anxiety disorder (Chronic)    Overview     COUNSELOR: Suzi Guerrero MA         Relevant Medications     sertraline (ZOLOFT) 50 MG tablet    ALPRAZolam (XANAX) 0.5 MG tablet       Cardiac/Vascular    Dyslipidemia associated with type 2 diabetes mellitus - Primary (Chronic)    Relevant Medications    metFORMIN (GLUCOPHAGE-XR) 500 MG XR 24hr tablet    atorvastatin (LIPITOR) 80 MG tablet    glimepiride (AMARYL) 4 MG tablet    Other Relevant Orders    Lipid panel    Comprehensive metabolic panel    Benign essential hypertension (Chronic)    Current Assessment & Plan     BP Readings from Last 6 Encounters:   03/19/20 (!) 144/86   10/18/19 120/86   08/08/19 116/78   07/19/19 (!) 162/96   05/27/19 (!) 158/92   05/03/19 (!) 146/94     Asymptomatic, uncontrolled.          Relevant Medications    losartan-hydrochlorothiazide 100-25 mg (HYZAAR) 100-25 mg per tablet    amLODIPine (NORVASC) 10 MG tablet    Other Relevant Orders    Comprehensive metabolic panel       Endocrine    Type 2 diabetes mellitus with other specified complication    Current Assessment & Plan     DIABETIC FOOT EXAM: Inspection of feet reveals no open wounds, ulcerations, significant calluses, or evidence of arterial insufficiency. 10g monofilament sensation is intact in all 5 locations tested.         Relevant Medications    metFORMIN (GLUCOPHAGE-XR) 500 MG XR 24hr tablet    glimepiride (AMARYL) 4 MG tablet    Other Relevant Orders    Comprehensive metabolic panel    Type 2 diabetes mellitus with hyperglycemia, without long-term current use of insulin (Chronic)    Current Assessment & Plan     Diabetes Management Status    Statin: Taking  ACE/ARB: Taking    Screening or Prevention Patient's value Goal Complete/Controlled?   HgA1C Testing and Control   Lab Results   Component Value Date    HGBA1C 10.4 (H) 10/03/2019      Annually/Less than 8% No   Lipid profile : 05/03/2019 Annually Yes   LDL control Lab Results   Component Value Date    LDLCALC 133.8 05/03/2019    Annually/Less than 100 mg/dl  No   Nephropathy screening Lab Results   Component Value Date     LABMICR 8.0 11/08/2018     No results found for: PROTEINUA Annually No   Blood pressure BP Readings from Last 1 Encounters:   03/19/20 (!) 144/86    Less than 140/90 No   Dilated retinal exam : 04/05/2019 Annually Yes   Foot exam   : 03/19/2020 Annually Yes     Lab Results   Component Value Date    HGBA1C 10.4 (H) 10/03/2019    HGBA1C 9.1 (H) 08/02/2019    HGBA1C 7.5 (H) 05/03/2019    ESTGFRAFRICA >60.0 08/02/2019    EGFRNONAA >60.0 08/02/2019    MICALBCREAT 6.8 11/08/2018    LDLCALC 133.8 05/03/2019       HEALTH MAINTENANCE: Diabetic health maintenance interventions reviewed and are up to date except for:      Topic    Eye Exam        Asymptomatic, uncontrolled.  He is due for diagnostic tests to evaluate and monitor this problem.           Relevant Medications    metFORMIN (GLUCOPHAGE-XR) 500 MG XR 24hr tablet    empagliflozin (JARDIANCE) 10 mg tablet    glimepiride (AMARYL) 4 MG tablet    Other Relevant Orders    Hemoglobin A1c    Lipid panel    Microalbumin/creatinine urine ratio    Comprehensive metabolic panel    Ambulatory referral/consult to Optometry       GI    Non-alcoholic fatty liver disease (Chronic)    Overview     US Abdomen Limited  Narrative: EXAMINATION:  US ABDOMEN LIMITED    CLINICAL HISTORY:  Other disorders of bilirubin metabolism    TECHNIQUE:  Limited ultrasound of the right upper quadrant of the abdomen (including pancreas, liver, gallbladder, common bile duct, and right kidney) was performed.    COMPARISON:  None    FINDINGS:  Liver: Normal in size measuring 20.3 cm. The liver demonstrates homogeneously increased in echotexture most consistent with diffuse fatty infiltration.  No focal hepatic lesions are seen.    Biliary system: The gallbladder demonstrates no evidence of calculi. No gallbladder wall thickening.  No sonographic Beyer sign. No pericholecystic fluid. The common duct is not dilated, measuring 3.2 mm. No intrahepatic ductal dilatation.    Pancreas: The visualized portions  of pancreas appear normal    Right kidney: Normal in size measuring 11.6 cm with no hydronephrosis.    Miscellaneous: No ascites.  Impression: 1. Hepatomegaly with increased echotexture of the liver suggesting diffuse fatty infiltration.  .    Electronically signed by: Juan Hebert DO  Date:    12/07/2018  Time:    09:03            Relevant Orders    Comprehensive metabolic panel       Orthopedic    Idiopathic chronic gout of multiple sites with tophus (Chronic)    Relevant Medications    indomethacin (INDOCIN) 25 MG capsule    colchicine (MITIGARE) 0.6 mg Cap    Other Relevant Orders    Uric acid    Acute idiopathic gout of right ankle    Relevant Medications    indomethacin (INDOCIN) 25 MG capsule    colchicine (MITIGARE) 0.6 mg Cap    Other Relevant Orders    Uric acid       Other    Seasonal allergic rhinitis due to pollen (Chronic)    Relevant Medications    montelukast (SINGULAIR) 10 mg tablet      Other Visit Diagnoses     Need for 23-polyvalent pneumococcal polysaccharide vaccine        Relevant Medications    pneumococcal vaccine (PNU-IMMUNE 23) 25 mcg/0.5 mL injection    Need for shingles vaccine        Relevant Medications    varicella-zoster gE-AS01B, PF, (SHINGRIX) 50 mcg/0.5 mL injection    Anxiety  (Chronic)       Relevant Medications    ALPRAZolam (XANAX) 0.5 MG tablet          MEDICATION MANAGMENT    MEDICATIONS SUMMARY: I have discontinued Shelton Young's colchicine-probenecid 0.5-500 mg. I have changed his MITIGARE to colchicine. I have also changed his ALPRAZolam and indomethacin. Additionally, I am having him start on pneumococcal vaccine, varicella-zoster gE-AS01B (PF), and sertraline. Lastly, I am having him maintain his LACTOBAC NO.41/BIFIDOBACT NO.7 (PROBIOTIC-10 ORAL), ASCORBATE CALCIUM (VITAMIN C ORAL), methocarbamoL, blood sugar diagnostic, blood-glucose meter, metFORMIN, losartan-hydrochlorothiazide 100-25 mg, amLODIPine, empagliflozin, atorvastatin, glimepiride, and  montelukast.    Outpatient Medications Prior to Visit   Medication Sig Dispense Refill    blood sugar diagnostic Strp 1 strip by Misc.(Non-Drug; Combo Route) route 4 (four) times daily with meals and nightly. 120 strip 11    blood-glucose meter kit Test finger stick blood sugar once daily. 1 each 0    amLODIPine (NORVASC) 10 MG tablet Take 1 tablet (10 mg total) by mouth once daily. 30 tablet 0    glimepiride (AMARYL) 4 MG tablet Take 1 tablet (4 mg total) by mouth before breakfast. 90 tablet 3    losartan-hydrochlorothiazide 100-25 mg (HYZAAR) 100-25 mg per tablet Take 1 tablet by mouth once daily. 30 tablet 0    metFORMIN (GLUCOPHAGE-XR) 500 MG XR 24hr tablet Take 2 tablets (1,000 mg total) by mouth 2 (two) times daily with meals. 120 tablet 0    montelukast (SINGULAIR) 10 mg tablet Take 1 tablet (10 mg total) by mouth every evening. 90 tablet 3    ASCORBATE CALCIUM (VITAMIN C ORAL) Take by mouth every other day.      LACTOBAC NO.41/BIFIDOBACT NO.7 (PROBIOTIC-10 ORAL) Take by mouth.      methocarbamol (ROBAXIN) 500 MG Tab Take 1 tablet (500 mg total) by mouth 2 (two) times daily as needed (muscle spasm). - DISCUSS MORE REFILLS AT APPOINTMENT 10AM FRI, 11/16/18 (Patient not taking: Reported on 3/19/2020) 10 tablet 0    ALPRAZolam (XANAX) 0.5 MG tablet Take 0.5-1 tablets (0.25-0.5 mg total) by mouth 2 (two) times daily as needed for Anxiety. - APPOINTMENT REQUIRED FOR MORE REFILLS (Patient not taking: Reported on 3/19/2020) 60 tablet 0    atorvastatin (LIPITOR) 80 MG tablet Take 1 tablet (80 mg total) by mouth once daily. (Patient not taking: Reported on 10/18/2019) 90 tablet 3    colchicine-probenecid 0.5-500 mg (CO-BENEMID) 500-0.5 mg Tab Take 1 tablet by mouth once daily. (Patient not taking: Reported on 3/19/2020) 20 tablet 1    empagliflozin (JARDIANCE) 10 mg tablet Take 1 tablet (10 mg total) by mouth once daily. (Patient not taking: Reported on 3/19/2020) 30 tablet 0    indomethacin (INDOCIN)  25 MG capsule Take 1 capsule (25 mg total) by mouth 3 (three) times daily as needed (ACUTE GOUT). - APPOINTMENT REQUIRED FOR MORE REFILLS (Patient not taking: Reported on 3/19/2020) 30 capsule 0    MITIGARE 0.6 mg Cap Take 1 capsule by mouth.       No facility-administered medications prior to visit.         Medications Discontinued During This Encounter   Medication Reason    colchicine-probenecid 0.5-500 mg (CO-BENEMID) 500-0.5 mg Tab Alternate therapy    metFORMIN (GLUCOPHAGE-XR) 500 MG XR 24hr tablet Reorder    losartan-hydrochlorothiazide 100-25 mg (HYZAAR) 100-25 mg per tablet Reorder    amLODIPine (NORVASC) 10 MG tablet Reorder    empagliflozin (JARDIANCE) 10 mg tablet Reorder    atorvastatin (LIPITOR) 80 MG tablet Reorder    ALPRAZolam (XANAX) 0.5 MG tablet Reorder    indomethacin (INDOCIN) 25 MG capsule Reorder    MITIGARE 0.6 mg Cap Reorder    glimepiride (AMARYL) 4 MG tablet Reorder    montelukast (SINGULAIR) 10 mg tablet Reorder        Medications Ordered This Encounter   Medications    ALPRAZolam (XANAX) 0.5 MG tablet     Sig: Take 0.5-1 tablets (0.25-0.5 mg total) by mouth 2 (two) times daily as needed for Anxiety.     Dispense:  60 tablet     Refill:  0    amLODIPine (NORVASC) 10 MG tablet     Sig: Take 1 tablet (10 mg total) by mouth once daily.     Dispense:  90 tablet     Refill:  4    atorvastatin (LIPITOR) 80 MG tablet     Sig: Take 1 tablet (80 mg total) by mouth once daily.     Dispense:  90 tablet     Refill:  4    colchicine (MITIGARE) 0.6 mg Cap     Sig: Take 2 capsules at onset of gout attack. May take 2 more capsule 2 hours later if needed. MAX 3 CAPSULES PER DAY. MAX 12 CAPSULES PER WEEK.     Dispense:  90 capsule     Refill:  1    empagliflozin (JARDIANCE) 10 mg tablet     Sig: Take 1 tablet (10 mg total) by mouth once daily.     Dispense:  30 tablet     Refill:  1     Dispense only 30-day QTY. Do not request 90-day QTY. APPOINTMENT REQUIRED FOR MORE REFILLS     glimepiride (AMARYL) 4 MG tablet     Sig: Take 1 tablet (4 mg total) by mouth before breakfast.     Dispense:  90 tablet     Refill:  4    indomethacin (INDOCIN) 25 MG capsule     Sig: Take 1 capsule (25 mg total) by mouth 3 (three) times daily as needed (ACUTE GOUT).     Dispense:  60 capsule     Refill:  0    losartan-hydrochlorothiazide 100-25 mg (HYZAAR) 100-25 mg per tablet     Sig: Take 1 tablet by mouth once daily.     Dispense:  90 tablet     Refill:  4    metFORMIN (GLUCOPHAGE-XR) 500 MG XR 24hr tablet     Sig: Take 2 tablets (1,000 mg total) by mouth 2 (two) times daily with meals.     Dispense:  120 tablet     Refill:  1     Dispense only 30-day QTY. Do not request 90-day QTY.    montelukast (SINGULAIR) 10 mg tablet     Sig: Take 1 tablet (10 mg total) by mouth every evening.     Dispense:  90 tablet     Refill:  4    pneumococcal vaccine (PNU-IMMUNE 23) 25 mcg/0.5 mL injection     Sig: Inject 0.5 mLs into the muscle once. for 1 dose     Dispense:  0.5 mL     Refill:  0    sertraline (ZOLOFT) 50 MG tablet     Sig: Take 1 tablet (50 mg total) by mouth once daily.     Dispense:  30 tablet     Refill:  1    varicella-zoster gE-AS01B, PF, (SHINGRIX) 50 mcg/0.5 mL injection     Sig: Inject 0.5 mL (one dose) into muscle now; give second dose at least 2 months later     Dispense:  0.5 mL     Refill:  1       FOLLOW-UP  Follow up in about 1 week (around 3/26/2020) for review test results, discuss treatment plan, re-evaluate problem(s) discussed today.     REVIEW OF SYSTEMS  CARDIOVASCULAR: No angina or orthopnea reported.   ENDOCRINE: No polyuria or polydipsia reported.   PSYCHIATRIC: No suicidal ideations reported.     PHYSICAL EXAM  Vitals:    03/19/20 1420   BP: (!) 144/86   BP Location: Right arm   Patient Position: Sitting   BP Method: Large (Manual)   Pulse: 72   Temp: 98.1 °F (36.7 °C)   TempSrc: Oral   SpO2: 97%   Weight: 93.3 kg (205 lb 11 oz)     CONSTITUTIONAL: Vital signs noted. No apparent  "distress. Does not appear acutely ill or septic. Appears adequately hydrated.  HEENT: External ENT grossly unremarkable. Hearing grossly intact. Oropharynx moist.  PULM: Lungs clear. Breathing unlabored.  HEART: Auscultation reveals regular rate and rhythm without murmur, gallop or rub.  DERM: Skin warm and moist with normal turgor.  NEURO: There are no gross focal motor deficits or gross deficits of cranial nerves III-XII.  PSYCHIATRIC: Alert and conversant. Mood grossly neutral. Judgment and insight grossly intact.      Documentation entered by me for this encounter may have been done in part using speech-recognition technology. Although I have made an effort to ensure accuracy, "sound like" errors may exist and should be interpreted in context. -PERRI Galvan MD.   "

## 2020-03-19 NOTE — ASSESSMENT & PLAN NOTE
Diabetes Management Status    Statin: Taking  ACE/ARB: Taking    Screening or Prevention Patient's value Goal Complete/Controlled?   HgA1C Testing and Control   Lab Results   Component Value Date    HGBA1C 10.4 (H) 10/03/2019      Annually/Less than 8% No   Lipid profile : 05/03/2019 Annually Yes   LDL control Lab Results   Component Value Date    LDLCALC 133.8 05/03/2019    Annually/Less than 100 mg/dl  No   Nephropathy screening Lab Results   Component Value Date    LABMICR 8.0 11/08/2018     No results found for: PROTEINUA Annually No   Blood pressure BP Readings from Last 1 Encounters:   03/19/20 (!) 144/86    Less than 140/90 No   Dilated retinal exam : 04/05/2019 Annually Yes   Foot exam   : 03/19/2020 Annually Yes     Lab Results   Component Value Date    HGBA1C 10.4 (H) 10/03/2019    HGBA1C 9.1 (H) 08/02/2019    HGBA1C 7.5 (H) 05/03/2019    ESTGFRAFRICA >60.0 08/02/2019    EGFRNONAA >60.0 08/02/2019    MICALBCREAT 6.8 11/08/2018    LDLCALC 133.8 05/03/2019       HEALTH MAINTENANCE: Diabetic health maintenance interventions reviewed and are up to date except for:      Topic    Eye Exam        Asymptomatic, uncontrolled.  He is due for diagnostic tests to evaluate and monitor this problem.

## 2020-03-19 NOTE — PATIENT INSTRUCTIONS
IMPORTANT: You must sign up for Hypertension Digital Medicine program and Diabetes Digital Medicine program.     Telemedicine Virtual Visits    What is a Virtual Visit?  A virtual visit is a secure video appointment with your provider via your smartphone or tablet.  This allows you to conduct a traditional office visit with your provider electronically through your "[employer] aubrey without leaving home or work.    How much is a Virtual Visit?  If you have health insurance, your insurance will be billed for the virtual visit. If your insurance does not cover a virtual visit (or if you do not have health insurance), you will be responsible for a $54 fee. If your insurance covers the virtual visit, you will be responsible for your co-pay, like when you come in for an office visit. (More and more insurances are covering virtual visits. If you have questions about your insurance coverage for virtual visits, you will need to contact your health insurance provider.)    Preparing for your Virtual Visit  · You must have a mobile phone or tablet with iOS or Android operating system.  · Your device must have a front-facing camera.  · You must have the "[employer] aubrey installed and [facility] selected as your healthcare provider.  · You can find the "[employer] aubrey in the Aubrey Store ([employer]) and Google Play Store (Android).  · If you need help with "[employer] help is available:  · online at [email or web address]   at the O-bar in the 1st floor lobby of [facility]  · or by phone at [phone]    E-Pre-Check  · Once your Virtual Visit is scheduled you will need to complete the ePre-Check in the "[employer] aubrey before your visit.   · During ePre-Check you will verify your insurance, demographics and sign the Telehealth Consent form.      How to Start Your Virtual Visit  Once your ePre-check is complete, you will need to test your hardware prior to your scheduled appointment.    1. Select the box that has your  "upcoming Virtual Visit appointment.  2.  On the next screen select the 'Test Video' button on the bottom of the screen.  3.  If successful, you will receive a pop-up saying "You're all set."        Ready for your Virtual Visit Appointment   · Select 'Appointments' from the [employer] home screen.  · Find and select today's Virtual Visit Appointment.  · Select 'Begin Visit'. Once selected you will enter a virtual visit waiting room until your provider arrives.     "

## 2020-03-22 NOTE — PROGRESS NOTES
"Shelton Gamble.    I'm happy to report that these test results are fine and do not require a change in treatment.    Thanks for letting me care for you, thanks for trusting us with your healthcare needs, and thanks for using [facility].    Sincerely,    PERRI Galvan MD  --------------------------------------------------------------------------------   Want to learn more about your test results and what they mean? (1) Log in to your [facility] account at [email or web address]. (2) From the "View test results" tab, click on the test you want to know more about. (3) Click on the "About This Test" link."

## 2020-03-22 NOTE — PROGRESS NOTES
"Shelton Gamble.    I'm happy to report that these test results are fine and do not require a change in treatment.    Thanks for letting me care for you, thanks for trusting us with your healthcare needs, and thanks for using [facility].    Sincerely,    PERRI Galvan MD  --------------------------------------------------------------------------------   Want to learn more about your test results and what they mean? (1) Log in to your [facility] account at [email or web address]. (2) From the "View test results" tab, click on the test you want to know more about. (3) Click on the "About This Test" link."

## 2020-03-24 ENCOUNTER — PATIENT OUTREACH (OUTPATIENT)
Dept: ADMINISTRATIVE | Facility: OTHER | Age: 60
End: 2020-03-24

## 2020-03-25 RX ORDER — INDOMETHACIN 25 MG/1
CAPSULE ORAL
Refills: 0 | OUTPATIENT
Start: 2020-03-25

## 2020-03-25 NOTE — TELEPHONE ENCOUNTER
REFILL REFUSED. REASON: The source prescription was reordered on 3/19/2020 by PERRI Galvan MD.      Requested Prescriptions     Refused Prescriptions Disp Refills    indomethacin (INDOCIN) 25 MG capsule [Pharmacy Med Name: Indomethacin 25 MG Oral Capsule]  0     Sig: TAKE 1 CAPSULE BY MOUTH THREE TIMES DAILY AS NEEDED FOR  ACUTE  GOUT     Refused By: AUDREY GALVAN     Reason for Refusal: Request already responded to by other means (e.g. phone or fax)

## 2020-03-26 ENCOUNTER — OFFICE VISIT (OUTPATIENT)
Dept: INTERNAL MEDICINE | Facility: CLINIC | Age: 60
End: 2020-03-26
Payer: COMMERCIAL

## 2020-03-26 ENCOUNTER — PATIENT OUTREACH (OUTPATIENT)
Dept: ADMINISTRATIVE | Facility: OTHER | Age: 60
End: 2020-03-26

## 2020-03-26 DIAGNOSIS — F33.0 MILD EPISODE OF RECURRENT MAJOR DEPRESSIVE DISORDER: Chronic | ICD-10-CM

## 2020-03-26 DIAGNOSIS — M1A.09X1 IDIOPATHIC CHRONIC GOUT OF MULTIPLE SITES WITH TOPHUS: Chronic | ICD-10-CM

## 2020-03-26 DIAGNOSIS — E11.69 DYSLIPIDEMIA ASSOCIATED WITH TYPE 2 DIABETES MELLITUS: Chronic | ICD-10-CM

## 2020-03-26 DIAGNOSIS — I10 BENIGN ESSENTIAL HYPERTENSION: Chronic | ICD-10-CM

## 2020-03-26 DIAGNOSIS — E11.65 TYPE 2 DIABETES MELLITUS WITH HYPERGLYCEMIA, WITHOUT LONG-TERM CURRENT USE OF INSULIN: ICD-10-CM

## 2020-03-26 DIAGNOSIS — E78.5 DYSLIPIDEMIA ASSOCIATED WITH TYPE 2 DIABETES MELLITUS: Chronic | ICD-10-CM

## 2020-03-26 DIAGNOSIS — E11.69 TYPE 2 DIABETES MELLITUS WITH OTHER SPECIFIED COMPLICATION, WITHOUT LONG-TERM CURRENT USE OF INSULIN: Primary | ICD-10-CM

## 2020-03-26 DIAGNOSIS — F41.1 GENERALIZED ANXIETY DISORDER: Chronic | ICD-10-CM

## 2020-03-26 PROCEDURE — 3051F PR MOST RECENT HEMOGLOBIN A1C LEVEL 7.0 - < 8.0%: ICD-10-PCS | Mod: CPTII,,, | Performed by: FAMILY MEDICINE

## 2020-03-26 PROCEDURE — 99214 OFFICE O/P EST MOD 30 MIN: CPT | Mod: GT,,, | Performed by: FAMILY MEDICINE

## 2020-03-26 PROCEDURE — 99214 PR OFFICE/OUTPT VISIT, EST, LEVL IV, 30-39 MIN: ICD-10-PCS | Mod: GT,,, | Performed by: FAMILY MEDICINE

## 2020-03-26 PROCEDURE — 3051F HG A1C>EQUAL 7.0%<8.0%: CPT | Mod: CPTII,,, | Performed by: FAMILY MEDICINE

## 2020-03-26 RX ORDER — METFORMIN HYDROCHLORIDE 500 MG/1
1000 TABLET, EXTENDED RELEASE ORAL 2 TIMES DAILY WITH MEALS
Qty: 90 TABLET | Refills: 1 | Status: SHIPPED | OUTPATIENT
Start: 2020-03-26 | End: 2020-08-26

## 2020-03-26 RX ORDER — SERTRALINE HYDROCHLORIDE 50 MG/1
50 TABLET, FILM COATED ORAL DAILY
Qty: 90 TABLET | Refills: 1 | Status: SHIPPED | OUTPATIENT
Start: 2020-03-26 | End: 2020-08-26 | Stop reason: SDUPTHER

## 2020-03-26 NOTE — PROGRESS NOTES
TELEMEDICINE VIRTUAL VISIT  CHIEF COMPLAINT  Follow-up    DIAGNOSES, HISTORY, ASSESSMENT, AND PLAN    1. Type 2 diabetes mellitus with other specified complication, without long-term current use of insulin    2. Generalized anxiety disorder    3. Mild episode of recurrent major depressive disorder    4. Type 2 diabetes mellitus with hyperglycemia, without long-term current use of insulin    5. Benign essential hypertension    6. Dyslipidemia associated with type 2 diabetes mellitus    7. Idiopathic chronic gout of multiple sites with tophus      Problem List Items Addressed This Visit        Cardiac/Vascular    Dyslipidemia associated with type 2 diabetes mellitus (Chronic)    Current Assessment & Plan     Lab Results   Component Value Date    CHOL 182 03/19/2020    CHOL 194 05/03/2019    TRIG 199 (H) 03/19/2020    TRIG 151 (H) 05/03/2019    HDL 30 (L) 03/19/2020    HDL 30 (L) 05/03/2019    LDLCALC 112.2 03/19/2020    LDLCALC 133.8 05/03/2019    NONHDLCHOL 152 03/19/2020    NONHDLCHOL 164 05/03/2019    AST 32 03/19/2020    ALT 38 03/19/2020     The 10-year ASCVD risk score (Gila BABCOCK Jr., et al., 2013) is: 23.5%    Values used to calculate the score:      Age: 59 years      Sex: Male      Is Non- : No      Diabetic: Yes      Tobacco smoker: No      Systolic Blood Pressure: 144 mmHg      Is BP treated: Yes      HDL Cholesterol: 30 mg/dL      Total Cholesterol: 182 mg/dL  He appears to be tolerating atorvastatin for dyslipidemia without apparent symptoms of myositis or hepatic dysfunction.              Relevant Medications    metFORMIN (GLUCOPHAGE-XR) 500 MG XR 24hr tablet    Benign essential hypertension (Chronic)    Current Assessment & Plan     He did NOT enroll in Hypertension Digital Medicine program. He reports /87 today, in spite of him having NOT taken BP meds 2 nights. We discussed strategies to help him adhere to his medication regimen. Therapeutic lifestyle changes encouraged.             Endocrine    Type 2 diabetes mellitus with other specified complication - Primary    Current Assessment & Plan     Diabetes Management Status    Statin: Taking  ACE/ARB: Taking    Screening or Prevention Patient's value Goal Complete/Controlled?   HgA1C Testing and Control   Lab Results   Component Value Date    HGBA1C 7.1 (H) 03/19/2020      Annually/Less than 8% Yes   Lipid profile : 03/19/2020 Annually Yes   LDL control Lab Results   Component Value Date    LDLCALC 112.2 03/19/2020    Annually/Less than 100 mg/dl  No   Nephropathy screening Lab Results   Component Value Date    LABMICR 19.0 03/19/2020     No results found for: PROTEINUA Annually Yes   Blood pressure BP Readings from Last 1 Encounters:   03/19/20 (!) 144/86    Less than 140/90 No   Dilated retinal exam : 04/05/2019 Annually No   Foot exam   : 03/19/2020 Annually Yes     Lab Results   Component Value Date    HGBA1C 7.1 (H) 03/19/2020    HGBA1C 10.4 (H) 10/03/2019    HGBA1C 9.1 (H) 08/02/2019    ESTGFRAFRICA >60.0 03/19/2020    EGFRNONAA >60.0 03/19/2020    MICALBCREAT 14.8 03/19/2020    LDLCALC 112.2 03/19/2020     HEALTH MAINTENANCE: Diabetic health maintenance interventions reviewed and are up to date except for:      Topic    Eye Exam      Sub-optimally controlled, improved.             Relevant Medications    metFORMIN (GLUCOPHAGE-XR) 500 MG XR 24hr tablet    Type 2 diabetes mellitus with hyperglycemia, without long-term current use of insulin (Chronic)    Relevant Medications    empagliflozin (JARDIANCE) 10 mg tablet    metFORMIN (GLUCOPHAGE-XR) 500 MG XR 24hr tablet       Orthopedic    Idiopathic chronic gout of multiple sites with tophus (Chronic)    Current Assessment & Plan     Lab Results   Component Value Date    URICACID 6.1 03/19/2020    URICACID 3.2 (L) 12/04/2018    URICACID 8.6 (H) 11/08/2018    ESTGFRAFRICA >60.0 03/19/2020    EGFRNONAA >60.0 03/19/2020    CREATININE 1.1 03/19/2020    BUN 14 03/19/2020   Clinically  compensated. Therapeutic dietary changes encouraged.                 Other    Mild episode of recurrent major depressive disorder (Chronic)    Overview     COUNSELOR: Suzi Guerrero MA         Current Assessment & Plan     Modestly improved.           Relevant Medications    sertraline (ZOLOFT) 50 MG tablet    Generalized anxiety disorder (Chronic)    Overview     COUNSELOR: Suzi Guerrero MA         Current Assessment & Plan     Modestly improved.           Relevant Medications    sertraline (ZOLOFT) 50 MG tablet           MEDICATION MANAGMENT    Outpatient Medications Prior to Visit   Medication Sig Dispense Refill    ALPRAZolam (XANAX) 0.5 MG tablet Take 0.5-1 tablets (0.25-0.5 mg total) by mouth 2 (two) times daily as needed for Anxiety. 60 tablet 0    amLODIPine (NORVASC) 10 MG tablet Take 1 tablet (10 mg total) by mouth once daily. 90 tablet 4    atorvastatin (LIPITOR) 80 MG tablet Take 1 tablet (80 mg total) by mouth once daily. 90 tablet 4    colchicine (MITIGARE) 0.6 mg Cap Take 2 capsules at onset of gout attack. May take 2 more capsule 2 hours later if needed. MAX 3 CAPSULES PER DAY. MAX 12 CAPSULES PER WEEK. 90 capsule 1    glimepiride (AMARYL) 4 MG tablet Take 1 tablet (4 mg total) by mouth before breakfast. 90 tablet 4    indomethacin (INDOCIN) 25 MG capsule Take 1 capsule (25 mg total) by mouth 3 (three) times daily as needed (ACUTE GOUT). 60 capsule 0    losartan-hydrochlorothiazide 100-25 mg (HYZAAR) 100-25 mg per tablet Take 1 tablet by mouth once daily. 90 tablet 4    montelukast (SINGULAIR) 10 mg tablet Take 1 tablet (10 mg total) by mouth every evening. 90 tablet 4    empagliflozin (JARDIANCE) 10 mg tablet Take 1 tablet (10 mg total) by mouth once daily. 30 tablet 1    metFORMIN (GLUCOPHAGE-XR) 500 MG XR 24hr tablet Take 2 tablets (1,000 mg total) by mouth 2 (two) times daily with meals. 120 tablet 1    sertraline (ZOLOFT) 50 MG tablet Take 1 tablet (50 mg total) by mouth once daily. 30  tablet 1    ASCORBATE CALCIUM (VITAMIN C ORAL) Take by mouth every other day.      blood sugar diagnostic Strp 1 strip by Misc.(Non-Drug; Combo Route) route 4 (four) times daily with meals and nightly. 120 strip 11    blood-glucose meter kit Test finger stick blood sugar once daily. 1 each 0    LACTOBAC NO.41/BIFIDOBACT NO.7 (PROBIOTIC-10 ORAL) Take by mouth.      varicella-zoster gE-AS01B, PF, (SHINGRIX) 50 mcg/0.5 mL injection Inject 0.5 mL (one dose) into muscle now; give second dose at least 2 months later 0.5 mL 1    methocarbamol (ROBAXIN) 500 MG Tab Take 1 tablet (500 mg total) by mouth 2 (two) times daily as needed (muscle spasm). - DISCUSS MORE REFILLS AT APPOINTMENT 10AM FRI, 11/16/18 (Patient not taking: Reported on 3/19/2020) 10 tablet 0     No facility-administered medications prior to visit.         Medications Discontinued During This Encounter   Medication Reason    methocarbamol (ROBAXIN) 500 MG Tab Patient no longer taking    sertraline (ZOLOFT) 50 MG tablet Reorder    empagliflozin (JARDIANCE) 10 mg tablet Reorder    metFORMIN (GLUCOPHAGE-XR) 500 MG XR 24hr tablet Reorder        Medications Ordered This Encounter   Medications    empagliflozin (JARDIANCE) 10 mg tablet     Sig: Take 1 tablet (10 mg total) by mouth once daily.     Dispense:  90 tablet     Refill:  1     APPOINTMENT REQUIRED FOR MORE REFILLS    metFORMIN (GLUCOPHAGE-XR) 500 MG XR 24hr tablet     Sig: Take 2 tablets (1,000 mg total) by mouth 2 (two) times daily with meals.     Dispense:  90 tablet     Refill:  1     APPOINTMENT REQUIRED FOR MORE REFILLS    sertraline (ZOLOFT) 50 MG tablet     Sig: Take 1 tablet (50 mg total) by mouth once daily.     Dispense:  90 tablet     Refill:  1     APPOINTMENT REQUIRED FOR MORE REFILLS        FOLLOW-UP  Follow up for re-evaluation in June-July.     REVIEW OF SYSTEMS  ENDOCRINE: No polyuria or polydipsia reported. No symptomatic or otherwise significant hypoglycemia or hyperglycemia  "reported.  CARDIOVASCULAR: No angina or orthopnea reported.     PHYSICAL EXAM  CONSTITUTIONAL: No apparent distress. Does not appear acutely ill or septic. Appears adequately hydrated.  PULM: Breathing unlabored.  PSYCHIATRIC: Alert and conversant and grossly oriented. Mood is grossly neutral. Affect appropriate. Judgment and insight grossly intact.    Documentation entered by me for this encounter may have been done in part using speech-recognition technology. Although I have made an effort to ensure accuracy, "sound like" errors may exist and should be interpreted in context. -PERRI Galvan MD.    Visit Details: This visit was a telemedicine virtual visit with synchronous audio and video. Sehlton reported that his location at the time of this visit was in the [facility]. Shelton chose and consented to receive these medical services by telemedicine.   "

## 2020-04-08 NOTE — ASSESSMENT & PLAN NOTE
He did NOT enroll in Hypertension Digital Medicine program. He reports /87 today, in spite of him having NOT taken BP meds 2 nights. We discussed strategies to help him adhere to his medication regimen. Therapeutic lifestyle changes encouraged.

## 2020-04-08 NOTE — ASSESSMENT & PLAN NOTE
Diabetes Management Status    Statin: Taking  ACE/ARB: Taking    Screening or Prevention Patient's value Goal Complete/Controlled?   HgA1C Testing and Control   Lab Results   Component Value Date    HGBA1C 7.1 (H) 03/19/2020      Annually/Less than 8% Yes   Lipid profile : 03/19/2020 Annually Yes   LDL control Lab Results   Component Value Date    LDLCALC 112.2 03/19/2020    Annually/Less than 100 mg/dl  No   Nephropathy screening Lab Results   Component Value Date    LABMICR 19.0 03/19/2020     No results found for: PROTEINUA Annually Yes   Blood pressure BP Readings from Last 1 Encounters:   03/19/20 (!) 144/86    Less than 140/90 No   Dilated retinal exam : 04/05/2019 Annually No   Foot exam   : 03/19/2020 Annually Yes     Lab Results   Component Value Date    HGBA1C 7.1 (H) 03/19/2020    HGBA1C 10.4 (H) 10/03/2019    HGBA1C 9.1 (H) 08/02/2019    ESTGFRAFRICA >60.0 03/19/2020    EGFRNONAA >60.0 03/19/2020    MICALBCREAT 14.8 03/19/2020    LDLCALC 112.2 03/19/2020     HEALTH MAINTENANCE: Diabetic health maintenance interventions reviewed and are up to date except for:      Topic    Eye Exam      Sub-optimally controlled, improved.

## 2020-04-08 NOTE — PATIENT INSTRUCTIONS
Shelton Gamble.    It was good seeing you recently during your virtual visit.    This is your reminder to schedule a follow-up appointment in late June or early July. This follow-up appointment must be an in-office visit.    Please take the time today to schedule your appointment and get it on your calendar. You can schedule an in-office appointment yourself from your [facility] account or from the [employer] leo on your smartphone or by calling our appointment desk at [phone].    Thanks for letting me care for you, thanks for trusting us with your healthcare needs, and thanks for using [facility].    Sincerely,    PERRI Galvan MD    P.S. I've included below some links to helpful information about how to protect yourself and your family from COVID-19 and what to do if you get sick.  Medications Ordered This Encounter   Medications    empagliflozin (JARDIANCE) 10 mg tablet     Sig: Take 1 tablet (10 mg total) by mouth once daily.     Dispense:  90 tablet     Refill:  1     APPOINTMENT REQUIRED FOR MORE REFILLS    metFORMIN (GLUCOPHAGE-XR) 500 MG XR 24hr tablet     Sig: Take 2 tablets (1,000 mg total) by mouth 2 (two) times daily with meals.     Dispense:  90 tablet     Refill:  1     APPOINTMENT REQUIRED FOR MORE REFILLS    sertraline (ZOLOFT) 50 MG tablet     Sig: Take 1 tablet (50 mg total) by mouth once daily.     Dispense:  90 tablet     Refill:  1     APPOINTMENT REQUIRED FOR MORE REFILLS      Medications Discontinued During This Encounter   Medication Reason    methocarbamol (ROBAXIN) 500 MG Tab Patient no longer taking      --------------------------------------------------------------------------------  IF YOU HAVE AN IPHONE OR IPAD, DOWNLOAD THE [employer] LEO:        https://apps.[employer].com/us/leo/[employer]/mg6356482256  With the leo you will get information and guidance from the most trusted sources, including the Centers for Disease Control and Prevention (CDC). Answer a few questions about  "your symptoms, travel history, and recent contacts and the leo will recommend specific next steps based on the CDC's current guidelines. Those concerned about whether they have contracted the virus can use the leo's screening tool. You can also learn best practices for washing your hands, practicing social distancing, quarantining, monitoring your symptoms, and disinfecting surfaces. You'll find answers to frequently asked questions about COVID-19, including who's at risk of serious illness and how to recognize the symptoms.    USE THE CDC'S "CORONAVIRUS SELF-" AVAILABLE ONLINE AT THE FOLLOWING LINK:        https://www.cdc.gov/coronavirus/2019-ncov/symptoms-testing/index.html#  It will ask you questions about your symptoms and health history and give you specific guidance on what to do next to get the care you need. At the CDC website you will find the most trustworthy and up-to-date information about COVID-19.    FOLLOW THE CDC'S RECOMMENDATIONS ON HOW TO PROTECT YOURSELF:        https://www.cdc.gov/coronavirus/2019-ncov/prevent-getting-sick/prevention.html    IF YOU OR A FAMILY MEMBER HAS SYMPTOMS:   - Follow the CDC's recommendations on how to protect yourself and your family:        https://www.cdc.gov/coronavirus/2019-ncov/hcp/guidance-prevent-spread.html#precautions   - Use the [facility] "Coronavirus Self-," available at the following link:        https://www.cdc.gov/coronavirus/2019-ncov/symptoms-testing/index.html#   - Do a video visit using [facility]        [email or web address]   - Call the [facility] COVID-19 Line ([phone]) for guidance.    WHERE TO GET INFORMATION ABOUT COVID-19:   - Dial 211 or Text keyword LACOVID to [phone] for general questions and information        [email or web address]   "

## 2020-04-08 NOTE — ASSESSMENT & PLAN NOTE
Lab Results   Component Value Date    URICACID 6.1 03/19/2020    URICACID 3.2 (L) 12/04/2018    URICACID 8.6 (H) 11/08/2018    ESTGFRAFRICA >60.0 03/19/2020    EGFRNONAA >60.0 03/19/2020    CREATININE 1.1 03/19/2020    BUN 14 03/19/2020   Clinically compensated. Therapeutic dietary changes encouraged.

## 2020-04-08 NOTE — ASSESSMENT & PLAN NOTE
Lab Results   Component Value Date    CHOL 182 03/19/2020    CHOL 194 05/03/2019    TRIG 199 (H) 03/19/2020    TRIG 151 (H) 05/03/2019    HDL 30 (L) 03/19/2020    HDL 30 (L) 05/03/2019    LDLCALC 112.2 03/19/2020    LDLCALC 133.8 05/03/2019    NONHDLCHOL 152 03/19/2020    NONHDLCHOL 164 05/03/2019    AST 32 03/19/2020    ALT 38 03/19/2020     The 10-year ASCVD risk score (Giladeo BABCOCK Jr., et al., 2013) is: 23.5%    Values used to calculate the score:      Age: 59 years      Sex: Male      Is Non- : No      Diabetic: Yes      Tobacco smoker: No      Systolic Blood Pressure: 144 mmHg      Is BP treated: Yes      HDL Cholesterol: 30 mg/dL      Total Cholesterol: 182 mg/dL  He appears to be tolerating atorvastatin for dyslipidemia without apparent symptoms of myositis or hepatic dysfunction.

## 2020-04-20 ENCOUNTER — PATIENT MESSAGE (OUTPATIENT)
Dept: INTERNAL MEDICINE | Facility: CLINIC | Age: 60
End: 2020-04-20

## 2020-08-05 ENCOUNTER — LAB VISIT (OUTPATIENT)
Dept: LAB | Facility: HOSPITAL | Age: 60
End: 2020-08-05
Attending: FAMILY MEDICINE
Payer: COMMERCIAL

## 2020-08-05 ENCOUNTER — OFFICE VISIT (OUTPATIENT)
Dept: INTERNAL MEDICINE | Facility: CLINIC | Age: 60
End: 2020-08-05
Payer: COMMERCIAL

## 2020-08-05 VITALS
WEIGHT: 215.81 LBS | HEIGHT: 69 IN | SYSTOLIC BLOOD PRESSURE: 130 MMHG | TEMPERATURE: 98 F | BODY MASS INDEX: 31.96 KG/M2 | OXYGEN SATURATION: 97 % | HEART RATE: 82 BPM | DIASTOLIC BLOOD PRESSURE: 78 MMHG

## 2020-08-05 DIAGNOSIS — E78.5 DYSLIPIDEMIA ASSOCIATED WITH TYPE 2 DIABETES MELLITUS: Chronic | ICD-10-CM

## 2020-08-05 DIAGNOSIS — Z12.5 SCREENING PSA (PROSTATE SPECIFIC ANTIGEN): ICD-10-CM

## 2020-08-05 DIAGNOSIS — E11.69 DYSLIPIDEMIA ASSOCIATED WITH TYPE 2 DIABETES MELLITUS: Chronic | ICD-10-CM

## 2020-08-05 DIAGNOSIS — N48.1 BALANITIS: ICD-10-CM

## 2020-08-05 DIAGNOSIS — I10 BENIGN ESSENTIAL HYPERTENSION: Primary | Chronic | ICD-10-CM

## 2020-08-05 DIAGNOSIS — E11.69 TYPE 2 DIABETES MELLITUS WITH OTHER SPECIFIED COMPLICATION, WITHOUT LONG-TERM CURRENT USE OF INSULIN: ICD-10-CM

## 2020-08-05 DIAGNOSIS — I10 BENIGN ESSENTIAL HYPERTENSION: Chronic | ICD-10-CM

## 2020-08-05 LAB
ALBUMIN SERPL BCP-MCNC: 4.4 G/DL (ref 3.5–5.2)
ALP SERPL-CCNC: 67 U/L (ref 55–135)
ALT SERPL W/O P-5'-P-CCNC: 55 U/L (ref 10–44)
ANION GAP SERPL CALC-SCNC: 9 MMOL/L (ref 8–16)
AST SERPL-CCNC: 33 U/L (ref 10–40)
BILIRUB SERPL-MCNC: 0.9 MG/DL (ref 0.1–1)
BUN SERPL-MCNC: 20 MG/DL (ref 6–20)
CALCIUM SERPL-MCNC: 9.6 MG/DL (ref 8.7–10.5)
CHLORIDE SERPL-SCNC: 100 MMOL/L (ref 95–110)
CHOLEST SERPL-MCNC: 204 MG/DL (ref 120–199)
CHOLEST/HDLC SERPL: 6.2 {RATIO} (ref 2–5)
CO2 SERPL-SCNC: 29 MMOL/L (ref 23–29)
COMPLEXED PSA SERPL-MCNC: 1.5 NG/ML (ref 0–4)
CREAT SERPL-MCNC: 1.4 MG/DL (ref 0.5–1.4)
EST. GFR  (AFRICAN AMERICAN): >60 ML/MIN/1.73 M^2
EST. GFR  (NON AFRICAN AMERICAN): 55 ML/MIN/1.73 M^2
ESTIMATED AVG GLUCOSE: 154 MG/DL (ref 68–131)
GLUCOSE SERPL-MCNC: 127 MG/DL (ref 70–110)
HBA1C MFR BLD HPLC: 7 % (ref 4–5.6)
HDLC SERPL-MCNC: 33 MG/DL (ref 40–75)
HDLC SERPL: 16.2 % (ref 20–50)
LDLC SERPL CALC-MCNC: 123.2 MG/DL (ref 63–159)
NONHDLC SERPL-MCNC: 171 MG/DL
POTASSIUM SERPL-SCNC: 4.9 MMOL/L (ref 3.5–5.1)
PROT SERPL-MCNC: 8.3 G/DL (ref 6–8.4)
SODIUM SERPL-SCNC: 138 MMOL/L (ref 136–145)
TRIGL SERPL-MCNC: 239 MG/DL (ref 30–150)

## 2020-08-05 PROCEDURE — 83036 HEMOGLOBIN GLYCOSYLATED A1C: CPT

## 2020-08-05 PROCEDURE — 3008F BODY MASS INDEX DOCD: CPT | Mod: CPTII,S$GLB,, | Performed by: FAMILY MEDICINE

## 2020-08-05 PROCEDURE — 36415 COLL VENOUS BLD VENIPUNCTURE: CPT

## 2020-08-05 PROCEDURE — 3075F PR MOST RECENT SYSTOLIC BLOOD PRESS GE 130-139MM HG: ICD-10-PCS | Mod: CPTII,S$GLB,, | Performed by: FAMILY MEDICINE

## 2020-08-05 PROCEDURE — 99214 OFFICE O/P EST MOD 30 MIN: CPT | Mod: S$GLB,,, | Performed by: FAMILY MEDICINE

## 2020-08-05 PROCEDURE — 80053 COMPREHEN METABOLIC PANEL: CPT

## 2020-08-05 PROCEDURE — 3078F DIAST BP <80 MM HG: CPT | Mod: CPTII,S$GLB,, | Performed by: FAMILY MEDICINE

## 2020-08-05 PROCEDURE — 3078F PR MOST RECENT DIASTOLIC BLOOD PRESSURE < 80 MM HG: ICD-10-PCS | Mod: CPTII,S$GLB,, | Performed by: FAMILY MEDICINE

## 2020-08-05 PROCEDURE — 3051F HG A1C>EQUAL 7.0%<8.0%: CPT | Mod: CPTII,S$GLB,, | Performed by: FAMILY MEDICINE

## 2020-08-05 PROCEDURE — 99999 PR PBB SHADOW E&M-EST. PATIENT-LVL V: CPT | Mod: PBBFAC,,, | Performed by: FAMILY MEDICINE

## 2020-08-05 PROCEDURE — 84153 ASSAY OF PSA TOTAL: CPT

## 2020-08-05 PROCEDURE — 3051F PR MOST RECENT HEMOGLOBIN A1C LEVEL 7.0 - < 8.0%: ICD-10-PCS | Mod: CPTII,S$GLB,, | Performed by: FAMILY MEDICINE

## 2020-08-05 PROCEDURE — 3075F SYST BP GE 130 - 139MM HG: CPT | Mod: CPTII,S$GLB,, | Performed by: FAMILY MEDICINE

## 2020-08-05 PROCEDURE — 99214 PR OFFICE/OUTPT VISIT, EST, LEVL IV, 30-39 MIN: ICD-10-PCS | Mod: S$GLB,,, | Performed by: FAMILY MEDICINE

## 2020-08-05 PROCEDURE — 80061 LIPID PANEL: CPT

## 2020-08-05 PROCEDURE — 3008F PR BODY MASS INDEX (BMI) DOCUMENTED: ICD-10-PCS | Mod: CPTII,S$GLB,, | Performed by: FAMILY MEDICINE

## 2020-08-05 PROCEDURE — 99999 PR PBB SHADOW E&M-EST. PATIENT-LVL V: ICD-10-PCS | Mod: PBBFAC,,, | Performed by: FAMILY MEDICINE

## 2020-08-05 RX ORDER — DOXYLAMINE SUCCINATE 25 MG
TABLET ORAL 2 TIMES DAILY
Qty: 14 G | Refills: 0 | Status: SHIPPED | OUTPATIENT
Start: 2020-08-05 | End: 2020-08-15

## 2020-08-05 NOTE — ASSESSMENT & PLAN NOTE
BP Readings from Last 6 Encounters:   08/05/20 130/78   03/19/20 (!) 144/86   10/18/19 120/86   08/08/19 116/78   07/19/19 (!) 162/96   05/27/19 (!) 158/92   Well controlled, improved.

## 2020-08-05 NOTE — PATIENT INSTRUCTIONS
Balanitis    Balanitis is an inflammation of the head of the penis. It can happen because of a buildup of germs (bacteria, viruses, or fungi) under the foreskin. It can also happen because of exposure to soaps and other chemicals. In adults, this is most often a complication of diabetes. It can also happen because of obesity or poor genital cleaning habits.  If it is not treated right away, this can lead to a condition called phimosis. This means you cannot pull back the foreskin from the head of the penis.  Symptoms of balanitis may include pain or tenderness of the penis, discharge, inability to retract the foreskin, difficulty urinating, and impotence.  Home care  The following guidelines will help you care for your condition at home:  · If you are able to retract your foreskin:  ¨ Children. Retract the foreskin and clean with water. Apply antibiotic cream or ointment to the penis three times a day.  ¨ Adults. Retract the foreskin and clean with water. Apply clotrimazole cream to the penis 3 times a day unless another medicine was prescribed. Clotrimazole cream is available over the counter. Avoid sexual activity while being treated.  ¨ Sitz baths. Soak the penis and foreskin in warm water while inflammation is present.  · If you have diabetes, talk with your doctor about keeping your diabetes in good control.  · If you are overweight, talk with your doctor about a weight loss plan.  Follow-up care  Follow up with your healthcare provider, or as advised.  When to seek medical advice  Call your healthcare provider right away if any of these occur:  · You can't retract the foreskin  · You can't return the retracted foreskin to the forward position. This requires immediate attention!  · Your symptoms get worse  · You have partial or complete blockage of the flow of urine  Date Last Reviewed: 9/1/2016  © 4079-0992 [employer]. [address] [place] [address], [place] [address]. All rights reserved. This  information is not intended as a substitute for professional medical care. Always follow your healthcare professional's instructions.

## 2020-08-05 NOTE — ASSESSMENT & PLAN NOTE
Diabetes Management Status    Statin: Taking  ACE/ARB: Taking    Screening or Prevention Patient's value Goal Complete/Controlled?   HgA1C Testing and Control   Lab Results   Component Value Date    HGBA1C 7.1 (H) 03/19/2020      Annually/Less than 8% Yes   Lipid profile : 03/19/2020 Annually Yes   LDL control Lab Results   Component Value Date    LDLCALC 112.2 03/19/2020    Annually/Less than 100 mg/dl  No   Nephropathy screening Lab Results   Component Value Date    LABMICR 19.0 03/19/2020     No results found for: PROTEINUA Annually Yes   Blood pressure BP Readings from Last 1 Encounters:   08/05/20 130/78    Less than 140/90 Yes   Dilated retinal exam : 04/05/2019 Annually Yes   Foot exam   : 03/19/2020 Annually Yes     Lab Results   Component Value Date    HGBA1C 7.1 (H) 03/19/2020    HGBA1C 10.4 (H) 10/03/2019    HGBA1C 9.1 (H) 08/02/2019    ESTGFRAFRICA >60.0 03/19/2020    EGFRNONAA >60.0 03/19/2020    MICALBCREAT 14.8 03/19/2020    LDLCALC 112.2 03/19/2020     HEALTH MAINTENANCE: Diabetic health maintenance interventions reviewed and are up to date except for:      Topic    Eye Exam      Improved.  He is due for diagnostic tests to evaluate and monitor this problem.

## 2020-08-05 NOTE — ASSESSMENT & PLAN NOTE
Å  Lab Results   Component Value Date    CHOL 182 03/19/2020    CHOL 194 05/03/2019    TRIG 199 (H) 03/19/2020    TRIG 151 (H) 05/03/2019    HDL 30 (L) 03/19/2020    HDL 30 (L) 05/03/2019    LDLCALC 112.2 03/19/2020    LDLCALC 133.8 05/03/2019    NONHDLCHOL 152 03/19/2020    NONHDLCHOL 164 05/03/2019    AST 32 03/19/2020    ALT 38 03/19/2020     The 10-year ASCVD risk score (Tarawa Terracedeo BABCOCK Jr., et al., 2013) is: 21.5%    Values used to calculate the score:      Age: 60 years      Sex: Male      Is Non- : No      Diabetic: Yes      Tobacco smoker: No      Systolic Blood Pressure: 130 mmHg      Is BP treated: Yes      HDL Cholesterol: 30 mg/dL      Total Cholesterol: 182 mg/dL  He appears to be tolerating statin for dyslipidemia without apparent symptoms of myositis or hepatic dysfunction.

## 2020-08-05 NOTE — PROGRESS NOTES
CHIEF COMPLAINT  Rash, Diabetes, and Hypertension      DIAGNOSES, HISTORY, ASSESSMENT, AND PLAN  Assessment   1. Benign essential hypertension    2. Dyslipidemia associated with type 2 diabetes mellitus    3. Type 2 diabetes mellitus with other specified complication, without long-term current use of insulin    4. Screening PSA (prostate specific antigen)    5. Balanitis         Orders Placed This Encounter    Comprehensive metabolic panel    Hemoglobin A1C    Lipid Panel    PSA, Screening    Ambulatory referral/consult to Ophthalmology    miconazole (MICOTIN) 2 % cream       Plan   Problem List Items Addressed This Visit     Benign essential hypertension - Primary (Chronic)    Current Assessment & Plan     BP Readings from Last 6 Encounters:   08/05/20 130/78   03/19/20 (!) 144/86   10/18/19 120/86   08/08/19 116/78   07/19/19 (!) 162/96   05/27/19 (!) 158/92   Well controlled, improved.         Relevant Orders    Comprehensive metabolic panel    Dyslipidemia associated with type 2 diabetes mellitus (Chronic)    Current Assessment & Plan     Å  Lab Results   Component Value Date    CHOL 182 03/19/2020    CHOL 194 05/03/2019    TRIG 199 (H) 03/19/2020    TRIG 151 (H) 05/03/2019    HDL 30 (L) 03/19/2020    HDL 30 (L) 05/03/2019    LDLCALC 112.2 03/19/2020    LDLCALC 133.8 05/03/2019    NONHDLCHOL 152 03/19/2020    NONHDLCHOL 164 05/03/2019    AST 32 03/19/2020    ALT 38 03/19/2020     The 10-year ASCVD risk score (Gila BABCOCK Jr., et al., 2013) is: 21.5%    Values used to calculate the score:      Age: 60 years      Sex: Male      Is Non- : No      Diabetic: Yes      Tobacco smoker: No      Systolic Blood Pressure: 130 mmHg      Is BP treated: Yes      HDL Cholesterol: 30 mg/dL      Total Cholesterol: 182 mg/dL  He appears to be tolerating statin for dyslipidemia without apparent symptoms of myositis or hepatic dysfunction.          Relevant Orders    Comprehensive metabolic panel    Lipid  Panel    Type 2 diabetes mellitus with other specified complication    Current Assessment & Plan     Diabetes Management Status    Statin: Taking  ACE/ARB: Taking    Screening or Prevention Patient's value Goal Complete/Controlled?   HgA1C Testing and Control   Lab Results   Component Value Date    HGBA1C 7.1 (H) 03/19/2020      Annually/Less than 8% Yes   Lipid profile : 03/19/2020 Annually Yes   LDL control Lab Results   Component Value Date    LDLCALC 112.2 03/19/2020    Annually/Less than 100 mg/dl  No   Nephropathy screening Lab Results   Component Value Date    LABMICR 19.0 03/19/2020     No results found for: PROTEINUA Annually Yes   Blood pressure BP Readings from Last 1 Encounters:   08/05/20 130/78    Less than 140/90 Yes   Dilated retinal exam : 04/05/2019 Annually Yes   Foot exam   : 03/19/2020 Annually Yes     Lab Results   Component Value Date    HGBA1C 7.1 (H) 03/19/2020    HGBA1C 10.4 (H) 10/03/2019    HGBA1C 9.1 (H) 08/02/2019    ESTGFRAFRICA >60.0 03/19/2020    EGFRNONAA >60.0 03/19/2020    MICALBCREAT 14.8 03/19/2020    LDLCALC 112.2 03/19/2020     HEALTH MAINTENANCE: Diabetic health maintenance interventions reviewed and are up to date except for:      Topic    Eye Exam      Improved.  He is due for diagnostic tests to evaluate and monitor this problem.         Relevant Orders    Comprehensive metabolic panel    Hemoglobin A1C    Ambulatory referral/consult to Ophthalmology    Balanitis    Current Assessment & Plan     Few days moist pruritic rash where foreskin overlaps coronal ridge.         Relevant Medications    miconazole (MICOTIN) 2 % cream      Other Visit Diagnoses     Screening PSA (prostate specific antigen)        Relevant Orders    PSA, Screening          Outpatient Medications Prior to Visit   Medication Sig Dispense Refill    amLODIPine (NORVASC) 10 MG tablet Take 1 tablet (10 mg total) by mouth once daily. 90 tablet 4    ASCORBATE CALCIUM (VITAMIN C ORAL) Take by mouth every  other day.      atorvastatin (LIPITOR) 80 MG tablet Take 1 tablet (80 mg total) by mouth once daily. 90 tablet 4    blood sugar diagnostic Strp 1 strip by Misc.(Non-Drug; Combo Route) route 4 (four) times daily with meals and nightly. 120 strip 11    blood-glucose meter kit Test finger stick blood sugar once daily. 1 each 0    colchicine (MITIGARE) 0.6 mg Cap Take 2 capsules at onset of gout attack. May take 2 more capsule 2 hours later if needed. MAX 3 CAPSULES PER DAY. MAX 12 CAPSULES PER WEEK. 90 capsule 1    empagliflozin (JARDIANCE) 10 mg tablet Take 1 tablet (10 mg total) by mouth once daily. 90 tablet 1    glimepiride (AMARYL) 4 MG tablet Take 1 tablet (4 mg total) by mouth before breakfast. 90 tablet 4    indomethacin (INDOCIN) 25 MG capsule Take 1 capsule (25 mg total) by mouth 3 (three) times daily as needed (ACUTE GOUT). 60 capsule 0    LACTOBAC NO.41/BIFIDOBACT NO.7 (PROBIOTIC-10 ORAL) Take by mouth.      losartan-hydrochlorothiazide 100-25 mg (HYZAAR) 100-25 mg per tablet Take 1 tablet by mouth once daily. 90 tablet 4    metFORMIN (GLUCOPHAGE-XR) 500 MG XR 24hr tablet Take 2 tablets (1,000 mg total) by mouth 2 (two) times daily with meals. 90 tablet 1    montelukast (SINGULAIR) 10 mg tablet Take 1 tablet (10 mg total) by mouth every evening. 90 tablet 4    sertraline (ZOLOFT) 50 MG tablet Take 1 tablet (50 mg total) by mouth once daily. 90 tablet 1    ALPRAZolam (XANAX) 0.5 MG tablet Take 0.5-1 tablets (0.25-0.5 mg total) by mouth 2 (two) times daily as needed for Anxiety. (Patient not taking: Reported on 8/5/2020) 60 tablet 0    varicella-zoster gE-AS01B, PF, (SHINGRIX) 50 mcg/0.5 mL injection Inject 0.5 mL (one dose) into muscle now; give second dose at least 2 months later (Patient not taking: Reported on 8/5/2020) 0.5 mL 1     No facility-administered medications prior to visit.         There are no discontinued medications.     Medications Ordered This Encounter   Medications     "miconazole (MICOTIN) 2 % cream     Sig: Apply topically 2 (two) times daily. for 10 days     Dispense:  14 g     Refill:  0       Follow up in about 2 weeks (around 8/19/2020) for virtual visit to review lab results.     Subjective   Review of Systems   Constitutional: Negative for fatigue and fever.   HENT: Negative for nasal congestion, rhinorrhea and sore throat.    Eyes: Negative for pain.   Respiratory: Negative for cough and shortness of breath.    Gastrointestinal: Negative for diarrhea and vomiting.   Endocrine: Negative for polydipsia and polyuria.   Genitourinary: Negative for discharge and dysuria.        Objective   Vitals:    08/05/20 0742   BP: 130/78   BP Location: Left arm   Patient Position: Sitting   BP Method: Medium (Automatic)   Pulse: 82   Temp: 97.8 °F (36.6 °C)   TempSrc: Tympanic   SpO2: 97%   Weight: 97.9 kg (215 lb 13.3 oz)   Height: 5' 9" (1.753 m)     Physical Exam  Vitals signs reviewed.   Constitutional:       General: He is not in acute distress.     Appearance: Normal appearance. He is not ill-appearing, toxic-appearing or diaphoretic.   HENT:      Head: Normocephalic and atraumatic.   Eyes:      General: No scleral icterus.  Cardiovascular:      Rate and Rhythm: Normal rate.   Pulmonary:      Effort: Pulmonary effort is normal.   Genitourinary:     Comments: Description of relevant exam of this system is documented above in HPI. External  exam otherwise unremarkable.  Neurological:      General: No focal deficit present.      Mental Status: He is alert and oriented to person, place, and time.   Psychiatric:         Mood and Affect: Mood normal.         Behavior: Behavior normal.         Judgment: Judgment normal.           Documentation entered by me for this encounter may have been done in part using speech-recognition technology. Although I have made an effort to ensure accuracy, "sound like" errors may exist and should be interpreted in context. -PERRI Galvan MD.  "

## 2020-08-10 ENCOUNTER — PATIENT MESSAGE (OUTPATIENT)
Dept: INTERNAL MEDICINE | Facility: CLINIC | Age: 60
End: 2020-08-10

## 2020-08-10 PROBLEM — E11.22 TYPE 2 DIABETES MELLITUS WITH STAGE 3 CHRONIC KIDNEY DISEASE, WITHOUT LONG-TERM CURRENT USE OF INSULIN: Chronic | Status: ACTIVE | Noted: 2019-08-08

## 2020-08-10 PROBLEM — N18.30 TYPE 2 DIABETES MELLITUS WITH STAGE 3 CHRONIC KIDNEY DISEASE, WITHOUT LONG-TERM CURRENT USE OF INSULIN: Chronic | Status: ACTIVE | Noted: 2019-08-08

## 2020-08-11 NOTE — TELEPHONE ENCOUNTER
"Shelton Gamble.    Your prostate specific antigen ("PSA" - a test used to screen for and monitor prostate cancer) is 1.5, which is COMPLETELY NORMAL. It's essentially the same as it was a year ago.    Your labs show that your diabetes is better controlled. (Good job!)    However, your lipid (cholesterol) panel shows that your cholesterol levels are BAD, which puts you at increased risk for heart disease and stroke.    QUESTION: In the last 30 days, how many doses of atorvastatin (Lipitor) did you take?  (You can check the date on your prescription bottle and count how many pills you have left.)    If you took atorvastatin (Lipitor) most every day, then we need to change you to a stronger medicine. If you missed several doses of the medicine, then we just need to figure out a way so you can remember to take the medicine every evening.    Your kidney function tests show that your kidney function is slightly reduced. This means that it is VERY important to keep your blood pressure and blood glucose (sugar) under excellent control. We will discuss this further at your next appointment.     Thanks for letting me care for you, thanks for trusting us with your healthcare needs, and thanks for using [facility].    Sincerely,    PERRI Galvan MD    P.S. Want to learn more about your test results and what they mean? It's as simple as 1, 2, 3.     (1) Log in to your [facility] account at [email or web address]     (2) From the "View test results" tab, click on the test you want to know more about.     (3) Click on the "About This Test" link.   "

## 2020-08-11 NOTE — PROGRESS NOTES
"Shelton Gamble.    Your prostate specific antigen ("PSA" - a test used to screen for and monitor prostate cancer) is 1.5, which is COMPLETELY NORMAL. It's essentially the same as it was a year ago.    Your labs show that your diabetes is better controlled. (Good job!)    However, your lipid (cholesterol) panel shows that your cholesterol levels are BAD, which puts you at increased risk for heart disease and stroke.    QUESTION: In the last 30 days, how many doses of atorvastatin (Lipitor) did you take? (You can check the date on your prescription bottle and count how many pills you have left.)    If you took atorvastatin (Lipitor) most every day, then we need to change you to a stronger medicine. If you missed several doses of the medicine, then we just need to figure out a way so you can remember to take the medicine every evening.    Your kidney function tests show that your kidney function is slightly reduced. This means that it is VERY important to keep your blood pressure and blood glucose (sugar) under excellent control. We will discuss this further at your next appointment.    Thanks for letting me care for you, thanks for trusting us with your healthcare needs, and thanks for using [facility].    Sincerely,    PERRI Galvan MD    P.S. Want to learn more about your test results and what they mean? It's as simple as 1, 2, 3.     (1) Log in to your [facility] account at [email or web address]     (2) From the "View test results" tab, click on the test you want to know more about.     (3) Click on the "About This Test" link."

## 2020-08-11 NOTE — TELEPHONE ENCOUNTER
"Shelton Gamble.    Your prostate specific antigen ("PSA" - a test used to screen for and monitor prostate cancer) is 1.5, which is COMPLETELY NORMAL. It's essentially the same as it was a year ago.    Your labs show that your diabetes is better controlled. (Good job!)    However, your lipid (cholesterol) panel shows that your cholesterol levels are BAD, which puts you at increased risk for heart disease and stroke.    QUESTION: In the last 30 days, how many doses of atorvastatin (Lipitor) did you take? (You can check the date on your prescription bottle and count how many pills you have left.)    If you took atorvastatin (Lipitor) most every day, then we need to change you to a stronger medicine. If you missed several doses of the medicine, then we just need to figure out a way so you can remember to take the medicine every evening.    Your kidney function tests show that your kidney function is slightly reduced. This means that it is VERY important to keep your blood pressure and blood glucose (sugar) under excellent control. We will discuss this further at your next appointment.    Thanks for letting me care for you, thanks for trusting us with your healthcare needs, and thanks for using [facility].    Sincerely,    PERRI Galvan MD    P.S. Want to learn more about your test results and what they mean? It's as simple as 1, 2, 3.  (1) Log in to your [facility] account at [email or web address]  (2) From the "View test results" tab, click on the test you want to know more about.  (3) Click on the "About This Test" link.  "

## 2020-08-25 ENCOUNTER — TELEPHONE (OUTPATIENT)
Dept: INTERNAL MEDICINE | Facility: CLINIC | Age: 60
End: 2020-08-25

## 2020-08-25 NOTE — TELEPHONE ENCOUNTER
----- Message from Delmy Mcgill sent at 8/25/2020 10:21 AM CDT -----  Carmen like to consult with nurse regarding being on virtual visit since 939am, still haven't seen doctor. Please give a call back at [phone].

## 2020-08-25 NOTE — TELEPHONE ENCOUNTER
Spoke with pt regarding message he left and informed him the he is schedule for 8/26/2020 and not to day.//LB

## 2020-08-26 ENCOUNTER — OFFICE VISIT (OUTPATIENT)
Dept: INTERNAL MEDICINE | Facility: CLINIC | Age: 60
End: 2020-08-26
Payer: COMMERCIAL

## 2020-08-26 ENCOUNTER — PATIENT MESSAGE (OUTPATIENT)
Dept: INTERNAL MEDICINE | Facility: CLINIC | Age: 60
End: 2020-08-26

## 2020-08-26 ENCOUNTER — TELEPHONE (OUTPATIENT)
Dept: INTERNAL MEDICINE | Facility: CLINIC | Age: 60
End: 2020-08-26

## 2020-08-26 DIAGNOSIS — F33.0 MILD EPISODE OF RECURRENT MAJOR DEPRESSIVE DISORDER: Chronic | ICD-10-CM

## 2020-08-26 DIAGNOSIS — E11.69 DYSLIPIDEMIA ASSOCIATED WITH TYPE 2 DIABETES MELLITUS: Primary | Chronic | ICD-10-CM

## 2020-08-26 DIAGNOSIS — I10 BENIGN ESSENTIAL HYPERTENSION: Chronic | ICD-10-CM

## 2020-08-26 DIAGNOSIS — E11.22 TYPE 2 DIABETES MELLITUS WITH STAGE 3 CHRONIC KIDNEY DISEASE, WITHOUT LONG-TERM CURRENT USE OF INSULIN: Chronic | ICD-10-CM

## 2020-08-26 DIAGNOSIS — R45.86 MOOD SWINGS: Primary | ICD-10-CM

## 2020-08-26 DIAGNOSIS — E78.5 DYSLIPIDEMIA ASSOCIATED WITH TYPE 2 DIABETES MELLITUS: Primary | Chronic | ICD-10-CM

## 2020-08-26 DIAGNOSIS — N18.30 TYPE 2 DIABETES MELLITUS WITH STAGE 3 CHRONIC KIDNEY DISEASE, WITHOUT LONG-TERM CURRENT USE OF INSULIN: Chronic | ICD-10-CM

## 2020-08-26 DIAGNOSIS — M1A.09X1 IDIOPATHIC CHRONIC GOUT OF MULTIPLE SITES WITH TOPHUS: Chronic | ICD-10-CM

## 2020-08-26 DIAGNOSIS — F41.1 GENERALIZED ANXIETY DISORDER: Chronic | ICD-10-CM

## 2020-08-26 PROCEDURE — 99214 OFFICE O/P EST MOD 30 MIN: CPT | Mod: 95,,, | Performed by: FAMILY MEDICINE

## 2020-08-26 PROCEDURE — 99214 PR OFFICE/OUTPT VISIT, EST, LEVL IV, 30-39 MIN: ICD-10-PCS | Mod: 95,,, | Performed by: FAMILY MEDICINE

## 2020-08-26 PROCEDURE — 3051F PR MOST RECENT HEMOGLOBIN A1C LEVEL 7.0 - < 8.0%: ICD-10-PCS | Mod: CPTII,,, | Performed by: FAMILY MEDICINE

## 2020-08-26 PROCEDURE — 3051F HG A1C>EQUAL 7.0%<8.0%: CPT | Mod: CPTII,,, | Performed by: FAMILY MEDICINE

## 2020-08-26 RX ORDER — ROSUVASTATIN CALCIUM 40 MG/1
40 TABLET, COATED ORAL NIGHTLY
Qty: 90 TABLET | Refills: 3 | Status: SHIPPED | OUTPATIENT
Start: 2020-08-26 | End: 2021-08-16 | Stop reason: SDUPTHER

## 2020-08-26 RX ORDER — INSULIN PUMP SYRINGE, 3 ML
EACH MISCELLANEOUS
Qty: 1 EACH | Refills: 0 | Status: SHIPPED | OUTPATIENT
Start: 2020-08-26

## 2020-08-26 RX ORDER — COLCHICINE 0.6 MG/1
CAPSULE ORAL
Qty: 90 CAPSULE | Refills: 1 | Status: SHIPPED | OUTPATIENT
Start: 2020-08-26 | End: 2020-08-28 | Stop reason: SDUPTHER

## 2020-08-26 RX ORDER — LANCETS
EACH MISCELLANEOUS
Qty: 200 EACH | Refills: 11 | Status: SHIPPED | OUTPATIENT
Start: 2020-08-26

## 2020-08-26 RX ORDER — EMPAGLIFLOZIN 10 MG/1
10 TABLET, FILM COATED ORAL DAILY
Qty: 90 TABLET | Refills: 1 | Status: SHIPPED | OUTPATIENT
Start: 2020-08-26 | End: 2021-08-16 | Stop reason: SDUPTHER

## 2020-08-26 RX ORDER — EMPAGLIFLOZIN 10 MG/1
10 TABLET, FILM COATED ORAL DAILY
Qty: 90 TABLET | Refills: 1 | Status: SHIPPED | OUTPATIENT
Start: 2020-08-26 | End: 2020-08-26 | Stop reason: SDUPTHER

## 2020-08-26 RX ORDER — INDOMETHACIN 25 MG/1
25 CAPSULE ORAL 3 TIMES DAILY PRN
Qty: 60 CAPSULE | Refills: 0 | Status: SHIPPED | OUTPATIENT
Start: 2020-08-26

## 2020-08-26 RX ORDER — SERTRALINE HYDROCHLORIDE 50 MG/1
50 TABLET, FILM COATED ORAL DAILY
Qty: 90 TABLET | Refills: 1 | Status: SHIPPED | OUTPATIENT
Start: 2020-08-26 | End: 2021-03-10

## 2020-08-26 RX ORDER — METFORMIN HYDROCHLORIDE 500 MG/1
1000 TABLET ORAL 2 TIMES DAILY WITH MEALS
Qty: 360 TABLET | Refills: 4 | Status: SHIPPED | OUTPATIENT
Start: 2020-08-26 | End: 2021-08-16 | Stop reason: SINTOL

## 2020-08-26 RX ORDER — SERTRALINE HYDROCHLORIDE 50 MG/1
50 TABLET, FILM COATED ORAL DAILY
Qty: 90 TABLET | Refills: 1 | Status: SHIPPED | OUTPATIENT
Start: 2020-08-26 | End: 2020-08-26 | Stop reason: SDUPTHER

## 2020-08-26 NOTE — ASSESSMENT & PLAN NOTE
Diabetes Management Status    Statin: Taking  ACE/ARB: Taking    Screening or Prevention Patient's value Goal Complete/Controlled?   HgA1C Testing and Control   Lab Results   Component Value Date    HGBA1C 7.0 (H) 08/05/2020      Annually/Less than 8% Yes   Lipid profile : 08/05/2020 Annually Yes   LDL control Lab Results   Component Value Date    LDLCALC 123.2 08/05/2020    Annually/Less than 100 mg/dl  No   Nephropathy screening Lab Results   Component Value Date    LABMICR 19.0 03/19/2020     No results found for: PROTEINUA Annually Yes   Blood pressure BP Readings from Last 1 Encounters:   08/05/20 130/78    Less than 140/90 Yes   Dilated retinal exam : 04/05/2019 Annually Yes   Foot exam   : 03/19/2020 Annually Yes     Lab Results   Component Value Date    HGBA1C 7.0 (H) 08/05/2020    HGBA1C 7.1 (H) 03/19/2020    HGBA1C 10.4 (H) 10/03/2019    ESTGFRAFRICA >60.0 08/05/2020    EGFRNONAA 55.0 (A) 08/05/2020    MICALBCREAT 14.8 03/19/2020    LDLCALC 123.2 08/05/2020     Last 6 Patient Entered Readings                                          Most Recent A1c:      There is no flowsheet data to display.           HEALTH MAINTENANCE: Diabetic health maintenance interventions reviewed and are up to date except for:      Topic    Eye Exam      Diabetes is controlled, stable. He agreed to schedule eye exam. (Referral previously provided.)

## 2020-08-26 NOTE — TELEPHONE ENCOUNTER
Spoke with pharmacist at [facility] they are calling to inquire  about instruction regarding Mitigare due to instruction being unclear.//LB

## 2020-08-26 NOTE — PATIENT INSTRUCTIONS
"Shelton Gamble.    It was nice seeing you during your virtual visit. Please review your After Visit Summary from your [facility] [facility]. (Just click on "Appointments," choose the appropriate past appointment, and click on "View After Visit Summary.")    Please take the time right now to schedule your labs (listed below) in late November and a follow-up appointment with me (either telemedicine virtual visit or in-office appointment) a few days later to review the test results. You can schedule your labs and appointment by calling our appointment desk at [phone]. If you have any difficulty, send my staff a message, and they will be glad to help.     I look forward to seeing you at your next appointment.    Thanks for letting me care for you, and thanks for trusting [facility] with your healthcare needs.    Sincerely,    PERRI Galvan MD     Orders Placed This Encounter   Procedures    Hemoglobin A1C    Lipid Panel    AST (SGOT)    ALT (SGPT)        Medications Ordered This Encounter   Medications    blood sugar diagnostic Strp     Sig: Check blood glucose 1 time daily as directed and as needed (dispense insurance preferred brand or patient choice)     Dispense:  200 each     Refill:  11     generic/brand therapeutic substitution ok -- use insurance-preferred brand unless patient requests otherwise    blood-glucose meter kit     Sig: Check blood glucose 1 time daily as directed and as needed (dispense insurance preferred brand or patient choice)     Dispense:  1 each     Refill:  0     generic/brand therapeutic substitution ok -- use insurance-preferred brand unless patient requests otherwise    colchicine (MITIGARE) 0.6 mg Cap     Sig: Take 2 capsules at onset of gout attack. May take 2 more capsule 2 hours later if needed. MAX 3 CAPSULES PER DAY. MAX 12 CAPSULES PER WEEK.     Dispense:  90 capsule     Refill:  1     Do not fill until patient requests.    empagliflozin (JARDIANCE) 10 mg tablet     Sig: Take 1 " tablet (10 mg total) by mouth once daily.     Dispense:  90 tablet     Refill:  1     .    indomethacin (INDOCIN) 25 MG capsule     Sig: Take 1 capsule (25 mg total) by mouth 3 (three) times daily as needed (ACUTE GOUT).     Dispense:  60 capsule     Refill:  0     Do not fill until patient requests.    lancets Misc     Sig: Check blood glucose 1 time daily as directed and as needed (dispense insurance preferred brand or patient choice)     Dispense:  200 each     Refill:  11     generic/brand therapeutic substitution ok -- use insurance-preferred brand unless patient requests otherwise    metFORMIN (GLUCOPHAGE) 500 MG tablet     Sig: Take 2 tablets (1,000 mg total) by mouth 2 (two) times daily with meals.     Dispense:  360 tablet     Refill:  4     IMPORTANT!  This REPLACES metformin-ER. DISCONTINUE any existing prescription for metformin-ER.    rosuvastatin (CRESTOR) 40 MG Tab     Sig: Take 1 tablet (40 mg total) by mouth every evening. (FOR CHOLESTEROL AND HEART HEALTH)     Dispense:  90 tablet     Refill:  3     IMPORTANT!  This REPLACES atorvastatin. DISCONTINUE any existing prescription for atorvastatin.    sertraline (ZOLOFT) 50 MG tablet     Sig: Take 1 tablet (50 mg total) by mouth once daily.     Dispense:  90 tablet     Refill:  1     .

## 2020-08-26 NOTE — ASSESSMENT & PLAN NOTE
Lab Results   Component Value Date    URICACID 6.1 03/19/2020    URICACID 3.2 (L) 12/04/2018    URICACID 8.6 (H) 11/08/2018    ESTGFRAFRICA >60.0 08/05/2020    EGFRNONAA 55.0 (A) 08/05/2020    CREATININE 1.4 08/05/2020    BUN 20 08/05/2020   Controlled, stable.

## 2020-08-26 NOTE — ASSESSMENT & PLAN NOTE
This condition is stable. He is no longer seeing counselor. I recommended he resume. He appears to be tolerating his current medications well and reports preceiving no adverse side-effects.

## 2020-08-26 NOTE — PROGRESS NOTES
TELEMEDICINE VIRTUAL VISIT    CHIEF COMPLAINT  Follow-up, Results, and Medication Refill      DIAGNOSES, HISTORY, ASSESSMENT, AND PLAN  Assessment   1. Dyslipidemia associated with type 2 diabetes mellitus    2. Mild episode of recurrent major depressive disorder    3. Generalized anxiety disorder    4. Benign essential hypertension    5. Type 2 diabetes mellitus with stage 3 chronic kidney disease, without long-term current use of insulin    6. Idiopathic chronic gout of multiple sites with tophus         Orders Placed This Encounter    Hemoglobin A1C    Lipid Panel    AST (SGOT)    ALT (SGPT)    indomethacin (INDOCIN) 25 MG capsule    blood-glucose meter kit    lancets Misc    blood sugar diagnostic Strp    rosuvastatin (CRESTOR) 40 MG Tab    metFORMIN (GLUCOPHAGE) 500 MG tablet    empagliflozin (JARDIANCE) 10 mg tablet    sertraline (ZOLOFT) 50 MG tablet       Except as noted herein, any chronic conditions are compensated/controlled and stable, and no other significant new complaints or concerns reported.     Plan   Problem List Items Addressed This Visit     Idiopathic chronic gout of multiple sites with tophus (Chronic)    Current Assessment & Plan     Lab Results   Component Value Date    URICACID 6.1 03/19/2020    URICACID 3.2 (L) 12/04/2018    URICACID 8.6 (H) 11/08/2018    ESTGFRAFRICA >60.0 08/05/2020    EGFRNONAA 55.0 (A) 08/05/2020    CREATININE 1.4 08/05/2020    BUN 20 08/05/2020   Controlled, stable.         Relevant Medications    indomethacin (INDOCIN) 25 MG capsule    Benign essential hypertension (Chronic)    Current Assessment & Plan     Controlled based on most recent in-office measurement.         Dyslipidemia associated with type 2 diabetes mellitus - Primary (Chronic)    Current Assessment & Plan     Lab Results   Component Value Date    CHOL 204 (H) 08/05/2020    CHOL 182 03/19/2020    TRIG 239 (H) 08/05/2020    TRIG 199 (H) 03/19/2020    HDL 33 (L) 08/05/2020    HDL 30 (L)  03/19/2020    LDLCALC 123.2 08/05/2020    LDLCALC 112.2 03/19/2020    NONHDLCHOL 171 08/05/2020    NONHDLCHOL 152 03/19/2020    AST 33 08/05/2020    ALT 55 (H) 08/05/2020     The 10-year ASCVD risk score (Gila BABCOCK Jr., et al., 2013) is: 22.3%    Values used to calculate the score:      Age: 60 years      Sex: Male      Is Non- : No      Diabetic: Yes      Tobacco smoker: No      Systolic Blood Pressure: 130 mmHg      Is BP treated: Yes      HDL Cholesterol: 33 mg/dL      Total Cholesterol: 204 mg/dL  He says he's been off his atorvastatin for > last 2 months due to leaving the medicine at his parent's house. He agreed to re-initiate statin (switching to rosuvastatin).         Relevant Medications    rosuvastatin (CRESTOR) 40 MG Tab    metFORMIN (GLUCOPHAGE) 500 MG tablet    Other Relevant Orders    Lipid Panel    AST (SGOT)    ALT (SGPT)    Generalized anxiety disorder (Chronic)    Overview     COUNSELOR: Suzi Guerrero MA         Current Assessment & Plan     This condition is stable. He is no longer seeing counselor. I recommended he resume. He appears to be tolerating his current medications well and reports preceiving no adverse side-effects.         Relevant Medications    sertraline (ZOLOFT) 50 MG tablet    Type 2 diabetes mellitus with stage 3 chronic kidney disease, without long-term current use of insulin (Chronic)    Current Assessment & Plan     Diabetes Management Status    Statin: Taking  ACE/ARB: Taking    Screening or Prevention Patient's value Goal Complete/Controlled?   HgA1C Testing and Control   Lab Results   Component Value Date    HGBA1C 7.0 (H) 08/05/2020      Annually/Less than 8% Yes   Lipid profile : 08/05/2020 Annually Yes   LDL control Lab Results   Component Value Date    LDLCALC 123.2 08/05/2020    Annually/Less than 100 mg/dl  No   Nephropathy screening Lab Results   Component Value Date    LABMICR 19.0 03/19/2020     No results found for: PROTEINUA Annually Yes    Blood pressure BP Readings from Last 1 Encounters:   08/05/20 130/78    Less than 140/90 Yes   Dilated retinal exam : 04/05/2019 Annually Yes   Foot exam   : 03/19/2020 Annually Yes     Lab Results   Component Value Date    HGBA1C 7.0 (H) 08/05/2020    HGBA1C 7.1 (H) 03/19/2020    HGBA1C 10.4 (H) 10/03/2019    ESTGFRAFRICA >60.0 08/05/2020    EGFRNONAA 55.0 (A) 08/05/2020    MICALBCREAT 14.8 03/19/2020    LDLCALC 123.2 08/05/2020     Last 6 Patient Entered Readings                                          Most Recent A1c:      There is no flowsheet data to display.           HEALTH MAINTENANCE: Diabetic health maintenance interventions reviewed and are up to date except for:      Topic    Eye Exam      Diabetes is controlled, stable. He agreed to schedule eye exam. (Referral previously provided.)         Relevant Medications    blood-glucose meter kit    lancets Misc    blood sugar diagnostic Strp    metFORMIN (GLUCOPHAGE) 500 MG tablet    empagliflozin (JARDIANCE) 10 mg tablet    Other Relevant Orders    Hemoglobin A1C    Mild episode of recurrent major depressive disorder (Chronic)    Overview     COUNSELOR: Suzi Guerrero MA         Current Assessment & Plan     This condition is stable. He is no longer seeing counselor. I recommended he resume. He appears to be tolerating his current medications well and reports preceiving no adverse side-effects.          Relevant Medications    sertraline (ZOLOFT) 50 MG tablet          Outpatient Medications Prior to Visit   Medication Sig Dispense Refill    amLODIPine (NORVASC) 10 MG tablet Take 1 tablet (10 mg total) by mouth once daily. 90 tablet 4    ASCORBATE CALCIUM (VITAMIN C ORAL) Take by mouth every other day.      glimepiride (AMARYL) 4 MG tablet Take 1 tablet (4 mg total) by mouth before breakfast. 90 tablet 4    LACTOBAC NO.41/BIFIDOBACT NO.7 (PROBIOTIC-10 ORAL) Take by mouth.      losartan-hydrochlorothiazide 100-25 mg (HYZAAR) 100-25 mg per tablet Take 1  tablet by mouth once daily. 90 tablet 4    miconazole (MICOTIN) 2 % cream Apply topically 2 (two) times daily. for 10 days 14 g 0    montelukast (SINGULAIR) 10 mg tablet Take 1 tablet (10 mg total) by mouth every evening. 90 tablet 4    varicella-zoster gE-AS01B, PF, (SHINGRIX) 50 mcg/0.5 mL injection Inject 0.5 mL (one dose) into muscle now; give second dose at least 2 months later (Patient not taking: Reported on 8/5/2020) 0.5 mL 1    ALPRAZolam (XANAX) 0.5 MG tablet Take 0.5-1 tablets (0.25-0.5 mg total) by mouth 2 (two) times daily as needed for Anxiety. (Patient not taking: Reported on 8/5/2020) 60 tablet 0    atorvastatin (LIPITOR) 80 MG tablet Take 1 tablet (80 mg total) by mouth once daily. 90 tablet 4    blood sugar diagnostic Strp 1 strip by Misc.(Non-Drug; Combo Route) route 4 (four) times daily with meals and nightly. 120 strip 11    blood-glucose meter kit Test finger stick blood sugar once daily. 1 each 0    colchicine (MITIGARE) 0.6 mg Cap Take 2 capsules at onset of gout attack. May take 2 more capsule 2 hours later if needed. MAX 3 CAPSULES PER DAY. MAX 12 CAPSULES PER WEEK. 90 capsule 1    empagliflozin (JARDIANCE) 10 mg tablet Take 1 tablet (10 mg total) by mouth once daily. 90 tablet 1    indomethacin (INDOCIN) 25 MG capsule Take 1 capsule (25 mg total) by mouth 3 (three) times daily as needed (ACUTE GOUT). 60 capsule 0    metFORMIN (GLUCOPHAGE-XR) 500 MG XR 24hr tablet Take 2 tablets (1,000 mg total) by mouth 2 (two) times daily with meals. 90 tablet 1    sertraline (ZOLOFT) 50 MG tablet Take 1 tablet (50 mg total) by mouth once daily. 90 tablet 1     No facility-administered medications prior to visit.         Medications Discontinued During This Encounter   Medication Reason    atorvastatin (LIPITOR) 80 MG tablet Alternate therapy    ALPRAZolam (XANAX) 0.5 MG tablet Patient no longer taking    blood-glucose meter kit     blood sugar diagnostic Strp     metFORMIN  (GLUCOPHAGE-XR) 500 MG XR 24hr tablet Change in Dosage Form    indomethacin (INDOCIN) 25 MG capsule Reorder    colchicine (MITIGARE) 0.6 mg Cap Reorder    sertraline (ZOLOFT) 50 MG tablet Reorder    empagliflozin (JARDIANCE) 10 mg tablet Reorder    sertraline (ZOLOFT) 50 MG tablet Reorder    empagliflozin (JARDIANCE) 10 mg tablet Reorder        Medications Ordered This Encounter   Medications    blood sugar diagnostic Strp     Sig: Check blood glucose 1 time daily as directed and as needed (dispense insurance preferred brand or patient choice)     Dispense:  200 each     Refill:  11     generic/brand therapeutic substitution ok -- use insurance-preferred brand unless patient requests otherwise    blood-glucose meter kit     Sig: Check blood glucose 1 time daily as directed and as needed (dispense insurance preferred brand or patient choice)     Dispense:  1 each     Refill:  0     generic/brand therapeutic substitution ok -- use insurance-preferred brand unless patient requests otherwise    empagliflozin (JARDIANCE) 10 mg tablet     Sig: Take 1 tablet (10 mg total) by mouth once daily.     Dispense:  90 tablet     Refill:  1     .    indomethacin (INDOCIN) 25 MG capsule     Sig: Take 1 capsule (25 mg total) by mouth 3 (three) times daily as needed (ACUTE GOUT).     Dispense:  60 capsule     Refill:  0     Do not fill until patient requests.    lancets Misc     Sig: Check blood glucose 1 time daily as directed and as needed (dispense insurance preferred brand or patient choice)     Dispense:  200 each     Refill:  11     generic/brand therapeutic substitution ok -- use insurance-preferred brand unless patient requests otherwise    metFORMIN (GLUCOPHAGE) 500 MG tablet     Sig: Take 2 tablets (1,000 mg total) by mouth 2 (two) times daily with meals.     Dispense:  360 tablet     Refill:  4     IMPORTANT!  This REPLACES metformin-ER. DISCONTINUE any existing prescription for metformin-ER.    rosuvastatin  "(CRESTOR) 40 MG Tab     Sig: Take 1 tablet (40 mg total) by mouth every evening. (FOR CHOLESTEROL AND HEART HEALTH)     Dispense:  90 tablet     Refill:  3     IMPORTANT!  This REPLACES atorvastatin. DISCONTINUE any existing prescription for atorvastatin.    sertraline (ZOLOFT) 50 MG tablet     Sig: Take 1 tablet (50 mg total) by mouth once daily.     Dispense:  90 tablet     Refill:  1     .       Subjective   Review of Systems   Constitutional: Positive for activity change. Negative for unexpected weight change.   HENT: Negative for hearing loss, rhinorrhea and trouble swallowing.    Eyes: Negative for discharge and visual disturbance.   Respiratory: Negative for chest tightness and wheezing.    Cardiovascular: Negative for chest pain and palpitations.   Gastrointestinal: Negative for blood in stool, constipation, diarrhea and vomiting.   Endocrine: Negative for polydipsia and polyuria.   Genitourinary: Negative for difficulty urinating, hematuria and urgency.   Musculoskeletal: Negative for arthralgias, joint swelling and neck pain.   Neurological: Negative for weakness and headaches.   Psychiatric/Behavioral: Positive for dysphoric mood. Negative for agitation, confusion, hallucinations, self-injury and suicidal ideas. The patient is not hyperactive.         Objective   CONSTITUTIONAL: No apparent distress. Appears comfortable. Does not appear acutely ill or septic. Appears adequately hydrated.  CARDIOVASCULAR: No perioral cyanosis.  PULMONARY: Breathing unlabored. No audible wheezes. Easily speaks in full sentences.  PSYCHIATRIC: Alert and conversant and grossly oriented. Mood is grossly neutral. Affect appropriate. Judgment and insight grossly intact.     Documentation entered by me for this encounter may have been done in part using speech-recognition technology. Although I have made an effort to ensure accuracy, "sound like" errors may exist and should be interpreted in context. -PERRI Galvan, " MD.    Visit Details: This visit was a telemedicine virtual visit with synchronous audio and video. Shelton reported that his location at the time of this visit was in the state [facility]. Shelton chose and consented to receive these medical services by telemedicine. TOTAL TIME evaluating and managing this patient for this encounter exceeded 25 minutes, the majority spent counseling and coordinating care for the listed diagnoses.

## 2020-08-26 NOTE — ASSESSMENT & PLAN NOTE
Lab Results   Component Value Date    CHOL 204 (H) 08/05/2020    CHOL 182 03/19/2020    TRIG 239 (H) 08/05/2020    TRIG 199 (H) 03/19/2020    HDL 33 (L) 08/05/2020    HDL 30 (L) 03/19/2020    LDLCALC 123.2 08/05/2020    LDLCALC 112.2 03/19/2020    NONHDLCHOL 171 08/05/2020    NONHDLCHOL 152 03/19/2020    AST 33 08/05/2020    ALT 55 (H) 08/05/2020     The 10-year ASCVD risk score (Gila BABCOCK Jr., et al., 2013) is: 22.3%    Values used to calculate the score:      Age: 60 years      Sex: Male      Is Non- : No      Diabetic: Yes      Tobacco smoker: No      Systolic Blood Pressure: 130 mmHg      Is BP treated: Yes      HDL Cholesterol: 33 mg/dL      Total Cholesterol: 204 mg/dL  He says he's been off his atorvastatin for > last 2 months due to leaving the medicine at his parent's house. He agreed to re-initiate statin (switching to rosuvastatin).

## 2020-08-27 NOTE — TELEPHONE ENCOUNTER
"Shelton Gamble.    I received your message asking for a referral to a psychiatrist and a urologist. I'm glad to help you with a referral to whatever specialists you need.    I have entered a referral order for you to see one of our excellent psychiatrists. It is important that you act SOON and call [phone] to schedule your appointment. I encourage you to ask to be placed on the waiting list for canceled appointments, because new appointments often open up a day or two in advance when someone else cancels their appointment.    IMPORANT: If you feel that you are experiencing an emotional crisis, go to the nearest emergency room or call the crisis intervention center at [phone].     Your prostate specific antigen ("PSA" - a test used to screen for and monitor prostate cancer) is NORMAL. Please remind me...what is the reason you need to see a urologist?    Thanks for letting me care for you, and thanks for trusting [facility] with your healthcare needs.    Take care and be well.    Sincerely,    PERRI Galvan MD  Orders Placed This Encounter   Procedures    Ambulatory referral/consult to Psychiatry      "

## 2020-08-28 RX ORDER — COLCHICINE 0.6 MG/1
CAPSULE ORAL
Qty: 90 CAPSULE | Refills: 1 | Status: SHIPPED | OUTPATIENT
Start: 2020-08-28 | End: 2022-08-02 | Stop reason: SDUPTHER

## 2020-08-28 NOTE — TELEPHONE ENCOUNTER
New eRx sent with clarification of instructions.    Medications Ordered This Encounter   Medications    colchicine (MITIGARE) 0.6 mg Cap     Sig: Take 2 capsules at onset of gout attack. May take 1 more capsule 2 hours later if needed. MAX 3 CAPSULES PER DAY. MAX 12 CAPSULES PER WEEK.     Dispense:  90 capsule     Refill:  1     Do not fill until patient requests.

## 2020-09-01 ENCOUNTER — TELEPHONE (OUTPATIENT)
Dept: INTERNAL MEDICINE | Facility: CLINIC | Age: 60
End: 2020-09-01

## 2020-09-01 NOTE — TELEPHONE ENCOUNTER
Called and left a voicemail for patient to see the patient portal message about referrals requested that Dr. Galvan sent him on 8/27/2020, and respond back with information requested.

## 2020-09-01 NOTE — TELEPHONE ENCOUNTER
----- Message from Caity Coleman sent at 9/1/2020  9:21 AM CDT -----  Contact: pt  Type:  Patient Returning Call    Who Called: pt  Who Left Message for Patient: Albaro  Does the patient know what this is regarding?: no  Would the patient rather a call back or a response via [facility]? Call back  Best Call Back Number: [phone]  Additional Information: n/a

## 2020-09-23 ENCOUNTER — PATIENT OUTREACH (OUTPATIENT)
Dept: ADMINISTRATIVE | Facility: HOSPITAL | Age: 60
End: 2020-09-23

## 2020-09-23 ENCOUNTER — PATIENT MESSAGE (OUTPATIENT)
Dept: ADMINISTRATIVE | Facility: OTHER | Age: 60
End: 2020-09-23

## 2020-09-25 ENCOUNTER — OFFICE VISIT (OUTPATIENT)
Dept: PSYCHIATRY | Facility: CLINIC | Age: 60
End: 2020-09-25
Payer: COMMERCIAL

## 2020-09-25 DIAGNOSIS — R45.86 MOOD SWINGS: ICD-10-CM

## 2020-09-25 DIAGNOSIS — F41.1 GENERALIZED ANXIETY DISORDER: Chronic | ICD-10-CM

## 2020-09-25 DIAGNOSIS — F41.9 ANXIETY: Primary | ICD-10-CM

## 2020-09-25 PROCEDURE — 99999 PR PBB SHADOW E&M-EST. PATIENT-LVL II: ICD-10-PCS | Mod: PBBFAC,,, | Performed by: PSYCHIATRY & NEUROLOGY

## 2020-09-25 PROCEDURE — 99999 PR PBB SHADOW E&M-EST. PATIENT-LVL II: CPT | Mod: PBBFAC,,, | Performed by: PSYCHIATRY & NEUROLOGY

## 2020-09-25 PROCEDURE — 90792 PSYCH DIAG EVAL W/MED SRVCS: CPT | Mod: S$GLB,,, | Performed by: PSYCHIATRY & NEUROLOGY

## 2020-09-25 PROCEDURE — 90792 PR PSYCHIATRIC DIAGNOSTIC EVALUATION W/MEDICAL SERVICES: ICD-10-PCS | Mod: S$GLB,,, | Performed by: PSYCHIATRY & NEUROLOGY

## 2020-09-25 RX ORDER — SERTRALINE HYDROCHLORIDE 100 MG/1
100 TABLET, FILM COATED ORAL DAILY
Qty: 30 TABLET | Refills: 2 | Status: SHIPPED | OUTPATIENT
Start: 2020-09-25 | End: 2021-03-10

## 2020-09-25 NOTE — PROGRESS NOTES
"Outpatient Psychiatry Initial Visit (MD/NP)    9/25/2020    Shelton Young, a 60 y.o. male, presenting for initial evaluation visit. Met with patient.    Reason for Encounter: Patient complains of hx of low moods.     History of Present Illness: Patient is a 59 y/o M who presents for establishment of care, endorses low moods, poor sleep, feeling stressed in context of relationship and parenting stressors. Started on sertraline by PCP (increases energy, may have worsened sleep), also prescribed xanax which he hasn't taken.     , estranged from wife - he describes their relationship as her having been verbally abusive, denigrating him "to try and control me", "but after a while I started to believe her". Wife was arrested for child endangerment in January, report submitted by step-son. Had video from surveillance camera going in the house. Has custody of his kids. Has had help from a nanny for childcare. Was briefly staying with friends after moving out.     Wife out on bail, pending trial.  from wife in August '19 after 10 years of marriage. 3 kids - 10, 8, 5. 2 years ago, lost my sister. "haven't mourned that".     Psych Hx: No previous assessments  one previous long periods of sadness or losses of interest     Past Medical History:   Diagnosis Date    Acute gout of left ankle 9/24/2018    Anxiety with depression 11/30/2018    Benign essential hypertension 11/8/2018    Borderline systolic HTN     Diabetes mellitus, type 2 Diagnosed 2015    Gastroesophageal reflux disease without esophagitis 11/16/2018    Non-alcoholic fatty liver disease 2/13/2019    Prediabetes     Type 2 diabetes mellitus with hyperglycemia, without long-term current use of insulin 8/8/2019    Type 2 diabetes mellitus with stage 3 chronic kidney disease, without long-term current use of insulin 8/8/2019    Type 2 diabetes mellitus without complication, without long-term current use of insulin 11/29/2017   Family Hx: " "uncle - PTSD from WWII.     Medical Hx:   Past Medical History:   Diagnosis Date    Acute gout of left ankle 2018    Anxiety with depression 2018    Benign essential hypertension 2018    Borderline systolic HTN     Diabetes mellitus, type 2 Diagnosed     Gastroesophageal reflux disease without esophagitis 2018    Non-alcoholic fatty liver disease 2019    Prediabetes     Type 2 diabetes mellitus with hyperglycemia, without long-term current use of insulin 2019    Type 2 diabetes mellitus with stage 3 chronic kidney disease, without long-term current use of insulin 2019    Type 2 diabetes mellitus without complication, without long-term current use of insulin 2017     Social Hx: born & raised in . Grew up with both parents in the household. Father was a . 2 brothers, 3 sisters. Youngest brother  when he was 15, youngest sister  when she was 50. Denies maltreatment growing up. Childhood was happy, liked school. Graduated HS. Played sports. Did 120 credits at hospitals, but didn't graduate. 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     Kids - going to school (hybrid distance/in-classroom). Tries to facilitate their learning when they're at home. They go to [employer].     , estranged from wife - she was verbally abusive, denigrating him "to try and control me", "but after a while I started to believe her". Wife was arrested for child endangerment in January, report submitted by step-son. Had video from surveillance camera going in the house. Has custody of his kids. Has had help from a  for childcare. Was briefly staying with friends after     Wife out on bail, pending trial.  from wife in  after 10 years of marriage. 3 kids - 10, 8, 5. 2 years ago, lost my sister. "haven't mourned that".      x 2. First marriage was 10 years, ended due to his infidelity. Has 2 kids from that " marriage. They live in [place], [place]. He's estranged from his daughter, but has a relationship with his son.     retired in August - living off severance from [employer] after working there for 13 years. Previously was a ,  x 20 years.     Review Of Systems:     GENERAL:  No weight gain or loss  SKIN:  No rashes or lacerations  HEAD:  No headaches  EYES:  No exophthalmos, jaundice or blindness  EARS:  No dizziness, tinnitus or hearing loss  NOSE:  No changes in smell  MOUTH & THROAT:  No dyskinetic movements or obvious goiter  CHEST:  No shortness of breath, hyperventilation or cough  CARDIOVASCULAR:  No tachycardia or chest pain  ABDOMEN:  No nausea, vomiting, pain, constipation or diarrhea  URINARY:  No frequency, dysuria or sexual dysfunction  ENDOCRINE:  No polydipsia, polyuria  MUSCULOSKELETAL:  No pain or stiffness of the joints  NEUROLOGIC:  No weakness, sensory changes, seizures, confusion, memory loss, tremor or other abnormal movements    Current Evaluation:     Nutritional Screening: Considering the patient's height and weight, medications, medical history and preferences, should a referral be made to the dietitian? no    Constitutional  Vitals:  Most recent vital signs, dated less than 90 days prior to this appointment, were not reviewed.       General:  unremarkable, age appropriate     Musculoskeletal  Muscle Strength/Tone:  no tremor, no tic   Gait & Station:  non-ataxic     Psychiatric  Appearance: casually dressed & groomed;   Behavior: calm,   Cooperation: cooperative with assessment  Speech: normal rate, volume, tone  Thought Process: linear, goal-directed  Thought Content: No suicidal or homicidal ideation; no delusions  Affect: normal range  Mood: euthymic  Perceptions: No auditory or visual hallucinations  Level of Consciousness: alert throughout interview  Insight: fair  Cognition: Oriented to person, place, time, & situation  Memory: no apparent deficits to general clinical  interview; not formally assessed  Attention/Concentration: no apparent deficits to general clinical interview; not formally assessed  Fund of Knowledge: average by vocabulary/education    Laboratory Data  No visits with results within 1 Month(s) from this visit.   Latest known visit with results is:   Lab Visit on 08/05/2020   Component Date Value Ref Range Status    Sodium 08/05/2020 138  136 - 145 mmol/L Final    Potassium 08/05/2020 4.9  3.5 - 5.1 mmol/L Final    Chloride 08/05/2020 100  95 - 110 mmol/L Final    CO2 08/05/2020 29  23 - 29 mmol/L Final    Glucose 08/05/2020 127* 70 - 110 mg/dL Final    BUN, Bld 08/05/2020 20  6 - 20 mg/dL Final    Creatinine 08/05/2020 1.4  0.5 - 1.4 mg/dL Final    Calcium 08/05/2020 9.6  8.7 - 10.5 mg/dL Final    Total Protein 08/05/2020 8.3  6.0 - 8.4 g/dL Final    Albumin 08/05/2020 4.4  3.5 - 5.2 g/dL Final    Total Bilirubin 08/05/2020 0.9  0.1 - 1.0 mg/dL Final    Alkaline Phosphatase 08/05/2020 67  55 - 135 U/L Final    AST 08/05/2020 33  10 - 40 U/L Final    ALT 08/05/2020 55* 10 - 44 U/L Final    Anion Gap 08/05/2020 9  8 - 16 mmol/L Final    eGFR if African American 08/05/2020 >60.0  >60 mL/min/1.73 m^2 Final    eGFR if non African American 08/05/2020 55.0* >60 mL/min/1.73 m^2 Final    Hemoglobin A1C 08/05/2020 7.0* 4.0 - 5.6 % Final    Estimated Avg Glucose 08/05/2020 154* 68 - 131 mg/dL Final    Cholesterol 08/05/2020 204* 120 - 199 mg/dL Final    Triglycerides 08/05/2020 239* 30 - 150 mg/dL Final    HDL 08/05/2020 33* 40 - 75 mg/dL Final    LDL Cholesterol 08/05/2020 123.2  63.0 - 159.0 mg/dL Final    Hdl/Cholesterol Ratio 08/05/2020 16.2* 20.0 - 50.0 % Final    Total Cholesterol/HDL Ratio 08/05/2020 6.2* 2.0 - 5.0 Final    Non-HDL Cholesterol 08/05/2020 171  mg/dL Final    PSA, SCREEN 08/05/2020 1.5  0.00 - 4.00 ng/mL Final     Medications  Outpatient Encounter Medications as of 9/25/2020   Medication Sig Dispense Refill    amLODIPine  (NORVASC) 10 MG tablet Take 1 tablet (10 mg total) by mouth once daily. 90 tablet 4    ASCORBATE CALCIUM (VITAMIN C ORAL) Take by mouth every other day.      blood sugar diagnostic Strp Check blood glucose 1 time daily as directed and as needed (dispense insurance preferred brand or patient choice) 200 each 11    blood-glucose meter kit Check blood glucose 1 time daily as directed and as needed (dispense insurance preferred brand or patient choice) 1 each 0    colchicine (MITIGARE) 0.6 mg Cap Take 2 capsules at onset of gout attack. May take 1 more capsule 2 hours later if needed. MAX 3 CAPSULES PER DAY. MAX 12 CAPSULES PER WEEK. 90 capsule 1    empagliflozin (JARDIANCE) 10 mg tablet Take 1 tablet (10 mg total) by mouth once daily. 90 tablet 1    glimepiride (AMARYL) 4 MG tablet Take 1 tablet (4 mg total) by mouth before breakfast. 90 tablet 4    indomethacin (INDOCIN) 25 MG capsule Take 1 capsule (25 mg total) by mouth 3 (three) times daily as needed (ACUTE GOUT). 60 capsule 0    LACTOBAC NO.41/BIFIDOBACT NO.7 (PROBIOTIC-10 ORAL) Take by mouth.      lancets Misc Check blood glucose 1 time daily as directed and as needed (dispense insurance preferred brand or patient choice) 200 each 11    losartan-hydrochlorothiazide 100-25 mg (HYZAAR) 100-25 mg per tablet Take 1 tablet by mouth once daily. 90 tablet 4    metFORMIN (GLUCOPHAGE) 500 MG tablet Take 2 tablets (1,000 mg total) by mouth 2 (two) times daily with meals. 360 tablet 4    miconazole (MICOTIN) 2 % cream Apply topically 2 (two) times daily. for 10 days 14 g 0    montelukast (SINGULAIR) 10 mg tablet Take 1 tablet (10 mg total) by mouth every evening. 90 tablet 4    rosuvastatin (CRESTOR) 40 MG Tab Take 1 tablet (40 mg total) by mouth every evening. (FOR CHOLESTEROL AND HEART HEALTH) 90 tablet 3    sertraline (ZOLOFT) 50 MG tablet Take 1 tablet (50 mg total) by mouth once daily. 90 tablet 1    varicella-zoster gE-AS01B, PF, (SHINGRIX) 50 mcg/0.5  mL injection Inject 0.5 mL (one dose) into muscle now; give second dose at least 2 months later (Patient not taking: Reported on 8/5/2020) 0.5 mL 1     No facility-administered encounter medications on file as of 9/25/2020.      Assessment - Diagnosis - Goals:     Impression: 61 y/o male with generally good baseline mental health with worse anxiety, in context of life stressors (see above).     Treatment Goals:  Specify outcomes written in observable, behavioral terms: reduce anxiety.     Treatment Plan/Recommendations:   · Sertraline 100 mg daily.   · Encouraged psychotherapy, how to access.   · Discussed risks, benefits, and alternatives to treatment plan documented above with patient. I answered all patient questions related to this plan and patient expressed understanding and agreement.     Return to Clinic: 2 months    Counseling time: 10 minutes  Total time: 50 minutes    MOISES Paintnig MD  Psychiatry  [facility]  [address] , [place] [address]  [phone]

## 2020-10-16 ENCOUNTER — PATIENT MESSAGE (OUTPATIENT)
Dept: INTERNAL MEDICINE | Facility: CLINIC | Age: 60
End: 2020-10-16

## 2020-10-16 NOTE — TELEPHONE ENCOUNTER
Shelton Gamble.    I received your message requesting samples of or a prescription for Cialis.    I reviewed your chart, and I didn't see where we had discussed erectile dysfunction (ED) before.    ED is a common condition, and we have several good treatments.    Please schedule an appointment with me at your earliest convenience so I can get additional history from you and discuss your treatment options and which treatment would be best for you.    Thanks for letting me care for you. I look forward to seeing you soon.    Sincerely,    PERRI Galvan MD

## 2020-10-30 ENCOUNTER — PATIENT MESSAGE (OUTPATIENT)
Dept: ADMINISTRATIVE | Facility: HOSPITAL | Age: 60
End: 2020-10-30

## 2020-11-11 ENCOUNTER — PATIENT MESSAGE (OUTPATIENT)
Dept: INTERNAL MEDICINE | Facility: CLINIC | Age: 60
End: 2020-11-11

## 2020-11-17 ENCOUNTER — PATIENT MESSAGE (OUTPATIENT)
Dept: INTERNAL MEDICINE | Facility: CLINIC | Age: 60
End: 2020-11-17

## 2020-11-19 ENCOUNTER — LAB VISIT (OUTPATIENT)
Dept: LAB | Facility: HOSPITAL | Age: 60
End: 2020-11-19
Attending: FAMILY MEDICINE
Payer: COMMERCIAL

## 2020-11-19 DIAGNOSIS — E11.69 DYSLIPIDEMIA ASSOCIATED WITH TYPE 2 DIABETES MELLITUS: Chronic | ICD-10-CM

## 2020-11-19 DIAGNOSIS — N18.30 TYPE 2 DIABETES MELLITUS WITH STAGE 3 CHRONIC KIDNEY DISEASE, WITHOUT LONG-TERM CURRENT USE OF INSULIN: Chronic | ICD-10-CM

## 2020-11-19 DIAGNOSIS — E78.5 DYSLIPIDEMIA ASSOCIATED WITH TYPE 2 DIABETES MELLITUS: Chronic | ICD-10-CM

## 2020-11-19 DIAGNOSIS — E11.22 TYPE 2 DIABETES MELLITUS WITH STAGE 3 CHRONIC KIDNEY DISEASE, WITHOUT LONG-TERM CURRENT USE OF INSULIN: Chronic | ICD-10-CM

## 2020-11-19 LAB
ALT SERPL W/O P-5'-P-CCNC: 84 U/L (ref 10–44)
AST SERPL-CCNC: 40 U/L (ref 10–40)
CHOLEST SERPL-MCNC: 119 MG/DL (ref 120–199)
CHOLEST/HDLC SERPL: 4.6 {RATIO} (ref 2–5)
ESTIMATED AVG GLUCOSE: 186 MG/DL (ref 68–131)
HBA1C MFR BLD HPLC: 8.1 % (ref 4–5.6)
HDLC SERPL-MCNC: 26 MG/DL (ref 40–75)
HDLC SERPL: 21.8 % (ref 20–50)
LDLC SERPL CALC-MCNC: 64.2 MG/DL (ref 63–159)
NONHDLC SERPL-MCNC: 93 MG/DL
TRIGL SERPL-MCNC: 144 MG/DL (ref 30–150)

## 2020-11-19 PROCEDURE — 84450 TRANSFERASE (AST) (SGOT): CPT

## 2020-11-19 PROCEDURE — 36415 COLL VENOUS BLD VENIPUNCTURE: CPT

## 2020-11-19 PROCEDURE — 83036 HEMOGLOBIN GLYCOSYLATED A1C: CPT

## 2020-11-19 PROCEDURE — 80061 LIPID PANEL: CPT

## 2020-11-19 PROCEDURE — 84460 ALANINE AMINO (ALT) (SGPT): CPT

## 2020-11-22 NOTE — PROGRESS NOTES
"Shelton Gamble.    These test results show that your cholesterol is significantly IMPROVED, but your diabetes is WORSE.  We'll discuss your test results in more detail, what they mean, and what needs to be done at your next appointment with me on Monday, November 30, 2020 at 2:40 PM. I look forward to seeing you then.    Thanks for letting me care for you, and thanks for trusting [facility] with your healthcare needs.    Sincerely,    PERRI Galvan MD  P.S. Want to learn more about your test results and what they mean? It's as simple as 1, 2, 3.     (1) Log in to your [email or web address] account at [email or web address]     (2) From the "View test results" tab, click on the test you want to know more about.     (3) Click on the "About This Test" link."

## 2020-11-25 ENCOUNTER — OFFICE VISIT (OUTPATIENT)
Dept: PSYCHIATRY | Facility: CLINIC | Age: 60
End: 2020-11-25
Payer: COMMERCIAL

## 2020-11-25 VITALS
SYSTOLIC BLOOD PRESSURE: 145 MMHG | WEIGHT: 217.63 LBS | DIASTOLIC BLOOD PRESSURE: 83 MMHG | BODY MASS INDEX: 32.13 KG/M2 | HEART RATE: 77 BPM

## 2020-11-25 DIAGNOSIS — F41.9 ANXIETY: Primary | ICD-10-CM

## 2020-11-25 PROCEDURE — 3079F DIAST BP 80-89 MM HG: CPT | Mod: CPTII,S$GLB,, | Performed by: PSYCHIATRY & NEUROLOGY

## 2020-11-25 PROCEDURE — 99999 PR PBB SHADOW E&M-EST. PATIENT-LVL II: CPT | Mod: PBBFAC,,, | Performed by: PSYCHIATRY & NEUROLOGY

## 2020-11-25 PROCEDURE — 99999 PR PBB SHADOW E&M-EST. PATIENT-LVL II: ICD-10-PCS | Mod: PBBFAC,,, | Performed by: PSYCHIATRY & NEUROLOGY

## 2020-11-25 PROCEDURE — 3077F SYST BP >= 140 MM HG: CPT | Mod: CPTII,S$GLB,, | Performed by: PSYCHIATRY & NEUROLOGY

## 2020-11-25 PROCEDURE — 3079F PR MOST RECENT DIASTOLIC BLOOD PRESSURE 80-89 MM HG: ICD-10-PCS | Mod: CPTII,S$GLB,, | Performed by: PSYCHIATRY & NEUROLOGY

## 2020-11-25 PROCEDURE — 3077F PR MOST RECENT SYSTOLIC BLOOD PRESSURE >= 140 MM HG: ICD-10-PCS | Mod: CPTII,S$GLB,, | Performed by: PSYCHIATRY & NEUROLOGY

## 2020-11-25 PROCEDURE — 3008F BODY MASS INDEX DOCD: CPT | Mod: CPTII,S$GLB,, | Performed by: PSYCHIATRY & NEUROLOGY

## 2020-11-25 PROCEDURE — 99214 PR OFFICE/OUTPT VISIT, EST, LEVL IV, 30-39 MIN: ICD-10-PCS | Mod: S$GLB,,, | Performed by: PSYCHIATRY & NEUROLOGY

## 2020-11-25 PROCEDURE — 99214 OFFICE O/P EST MOD 30 MIN: CPT | Mod: S$GLB,,, | Performed by: PSYCHIATRY & NEUROLOGY

## 2020-11-25 PROCEDURE — 3008F PR BODY MASS INDEX (BMI) DOCUMENTED: ICD-10-PCS | Mod: CPTII,S$GLB,, | Performed by: PSYCHIATRY & NEUROLOGY

## 2020-11-25 RX ORDER — SERTRALINE HYDROCHLORIDE 100 MG/1
150 TABLET, FILM COATED ORAL DAILY
Qty: 45 TABLET | Refills: 2 | Status: SHIPPED | OUTPATIENT
Start: 2020-11-25 | End: 2021-03-10

## 2020-11-25 NOTE — PROGRESS NOTES
"Outpatient Psychiatry Follow-up Visit (MD/NP)    11/25/2020    Shelton Young, a 60 y.o. male, presenting for follow-up visit. Met with patient.    Reason for Encounter: Patient complains of hx of low moods.     Interval: Patient seen and interviewed for follow-up, last seen about two months ago. Describes ongoing stressors related to kids/custody/ex. Moods modestly improved. Having higher blood sugars by a1c. Will be following up soon. No new or different meds. No other new medications. Adherent to medication. No side effects.     Background: Pt is a 61 y/o M who presents for establishment of care, endorses low moods, poor sleep, feeling stressed in context of relationship and parenting stressors. Started on sertraline by PCP (increases energy, may have worsened sleep), also prescribed xanax which he hasn't taken.     , estranged from wife - he describes their relationship as her having been verbally abusive, denigrating him "to try and control me", "but after a while I started to believe her". Wife was arrested for child endangerment in January, report submitted by step-son. Had video from surveillance camera going in the house. Has custody of his kids. Has had help from a nanny for childcare. Was briefly staying with friends after moving out.     Wife out on bail, pending trial.  from wife in August '19 after 10 years of marriage. 3 kids - 10, 8, 5. 2 years ago, lost my sister. "haven't mourned that".     Psych Hx: No previous assessments  one previous long periods of sadness or losses of interest     Past Medical History:   Diagnosis Date    Acute gout of left ankle 9/24/2018    Anxiety with depression 11/30/2018    Benign essential hypertension 11/8/2018    Borderline systolic HTN     Diabetes mellitus, type 2 Diagnosed 2015    Gastroesophageal reflux disease without esophagitis 11/16/2018    Non-alcoholic fatty liver disease 2/13/2019    Prediabetes     Type 2 diabetes mellitus " "with hyperglycemia, without long-term current use of insulin 2019    Type 2 diabetes mellitus with stage 3 chronic kidney disease, without long-term current use of insulin 2019    Type 2 diabetes mellitus without complication, without long-term current use of insulin 2017   Family Hx: uncle - PTSD from WWII.     Medical Hx:   Past Medical History:   Diagnosis Date    Acute gout of left ankle 2018    Anxiety with depression 2018    Benign essential hypertension 2018    Borderline systolic HTN     Diabetes mellitus, type 2 Diagnosed     Gastroesophageal reflux disease without esophagitis 2018    Non-alcoholic fatty liver disease 2019    Prediabetes     Type 2 diabetes mellitus with hyperglycemia, without long-term current use of insulin 2019    Type 2 diabetes mellitus with stage 3 chronic kidney disease, without long-term current use of insulin 2019    Type 2 diabetes mellitus without complication, without long-term current use of insulin 2017     Social Hx: born & raised in . Grew up with both parents in the household. Father was a . 2 brothers, 3 sisters. Youngest brother  when he was 15, youngest sister  when she was 50. Denies maltreatment growing up. Childhood was happy, liked school. Graduated HS. Played sports. Did 120 credits at [employer], but didn't graduate. 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     Kids - going to school (hybrid distance/in-classroom). Tries to facilitate their learning when they're at home. They go to [employer].     , estranged from wife - she was verbally abusive, denigrating him "to try and control me", "but after a while I started to believe her". Wife was arrested for child endangerment in January, report submitted by step-son. Had video from surveillance camera going in the house. Has custody of his kids. Has had help from lucina gambino for childcare. Was briefly " "staying with friends after     Wife out on bail, pending trial.  from wife in August '19 after 10 years of marriage. 3 kids - 10, 8, 5. 2 years ago, lost my sister. "haven't mourned that".      x 2. First marriage was 10 years, ended due to his infidelity. Has 2 kids from that marriage. They live in [place], [place]. He's estranged from his daughter, but has a relationship with his son.     retired in August - living off severance from [employer] after working there for 13 years. Previously was a ,  x 20 years.     Review Of Systems:     GENERAL:  No weight gain or loss  SKIN:  No rashes or lacerations  HEAD:  No headaches  EYES:  No exophthalmos, jaundice or blindness  EARS:  No dizziness, tinnitus or hearing loss  NOSE:  No changes in smell  MOUTH & THROAT:  No dyskinetic movements or obvious goiter  CHEST:  No shortness of breath, hyperventilation or cough  CARDIOVASCULAR:  No tachycardia or chest pain  ABDOMEN:  No nausea, vomiting, pain, constipation or diarrhea  URINARY:  No frequency, dysuria or sexual dysfunction  ENDOCRINE:  No polydipsia, polyuria  MUSCULOSKELETAL:  No pain or stiffness of the joints  NEUROLOGIC:  No weakness, sensory changes, seizures, confusion, memory loss, tremor or other abnormal movements    Current Evaluation:     Nutritional Screening: Considering the patient's height and weight, medications, medical history and preferences, should a referral be made to the dietitian? no    Constitutional  Vitals:  Most recent vital signs, dated less than 90 days prior to this appointment, were not reviewed.       General:  unremarkable, age appropriate     Musculoskeletal  Muscle Strength/Tone:  no tremor, no tic   Gait & Station:  non-ataxic     Psychiatric  Appearance: casually dressed & groomed;   Behavior: calm,   Cooperation: cooperative with assessment  Speech: normal rate, volume, tone  Thought Process: linear, goal-directed  Thought Content: No suicidal or " homicidal ideation; no delusions  Affect: normal range  Mood: euthymic  Perceptions: No auditory or visual hallucinations  Level of Consciousness: alert throughout interview  Insight: fair  Cognition: Oriented to person, place, time, & situation  Memory: no apparent deficits to general clinical interview; not formally assessed  Attention/Concentration: no apparent deficits to general clinical interview; not formally assessed  Fund of Knowledge: average by vocabulary/education    Laboratory Data  Lab Visit on 11/19/2020   Component Date Value Ref Range Status    Hemoglobin A1C 11/19/2020 8.1* 4.0 - 5.6 % Final    Estimated Avg Glucose 11/19/2020 186* 68 - 131 mg/dL Final    Cholesterol 11/19/2020 119* 120 - 199 mg/dL Final    Triglycerides 11/19/2020 144  30 - 150 mg/dL Final    HDL 11/19/2020 26* 40 - 75 mg/dL Final    LDL Cholesterol 11/19/2020 64.2  63.0 - 159.0 mg/dL Final    HDL/Cholesterol Ratio 11/19/2020 21.8  20.0 - 50.0 % Final    Total Cholesterol/HDL Ratio 11/19/2020 4.6  2.0 - 5.0 Final    Non-HDL Cholesterol 11/19/2020 93  mg/dL Final    AST 11/19/2020 40  10 - 40 U/L Final    ALT 11/19/2020 84* 10 - 44 U/L Final     Medications  Outpatient Encounter Medications as of 11/25/2020   Medication Sig Dispense Refill    amLODIPine (NORVASC) 10 MG tablet Take 1 tablet (10 mg total) by mouth once daily. 90 tablet 4    ASCORBATE CALCIUM (VITAMIN C ORAL) Take by mouth every other day.      blood sugar diagnostic Strp Check blood glucose 1 time daily as directed and as needed (dispense insurance preferred brand or patient choice) 200 each 11    blood-glucose meter kit Check blood glucose 1 time daily as directed and as needed (dispense insurance preferred brand or patient choice) 1 each 0    colchicine (MITIGARE) 0.6 mg Cap Take 2 capsules at onset of gout attack. May take 1 more capsule 2 hours later if needed. MAX 3 CAPSULES PER DAY. MAX 12 CAPSULES PER WEEK. 90 capsule 1    empagliflozin  (JARDIANCE) 10 mg tablet Take 1 tablet (10 mg total) by mouth once daily. 90 tablet 1    glimepiride (AMARYL) 4 MG tablet Take 1 tablet (4 mg total) by mouth before breakfast. 90 tablet 4    indomethacin (INDOCIN) 25 MG capsule Take 1 capsule (25 mg total) by mouth 3 (three) times daily as needed (ACUTE GOUT). 60 capsule 0    LACTOBAC NO.41/BIFIDOBACT NO.7 (PROBIOTIC-10 ORAL) Take by mouth.      lancets Misc Check blood glucose 1 time daily as directed and as needed (dispense insurance preferred brand or patient choice) 200 each 11    losartan-hydrochlorothiazide 100-25 mg (HYZAAR) 100-25 mg per tablet Take 1 tablet by mouth once daily. 90 tablet 4    metFORMIN (GLUCOPHAGE) 500 MG tablet Take 2 tablets (1,000 mg total) by mouth 2 (two) times daily with meals. 360 tablet 4    miconazole (MICOTIN) 2 % cream Apply topically 2 (two) times daily. for 10 days 14 g 0    montelukast (SINGULAIR) 10 mg tablet Take 1 tablet (10 mg total) by mouth every evening. 90 tablet 4    rosuvastatin (CRESTOR) 40 MG Tab Take 1 tablet (40 mg total) by mouth every evening. (FOR CHOLESTEROL AND HEART HEALTH) 90 tablet 3    sertraline (ZOLOFT) 100 MG tablet Take 1 tablet (100 mg total) by mouth once daily. 30 tablet 2    sertraline (ZOLOFT) 50 MG tablet Take 1 tablet (50 mg total) by mouth once daily. 90 tablet 1    varicella-zoster gE-AS01B, PF, (SHINGRIX) 50 mcg/0.5 mL injection Inject 0.5 mL (one dose) into muscle now; give second dose at least 2 months later (Patient not taking: Reported on 8/5/2020) 0.5 mL 1     No facility-administered encounter medications on file as of 11/25/2020.      Assessment - Diagnosis - Goals:     Impression: 61 y/o male with generally good baseline mental health with worse anxiety, in context of life stressors (see above). Modest improvement with sertraline increase.     Treatment Goals:  Specify outcomes written in observable, behavioral terms: reduce anxiety.     Treatment Plan/Recommendations:    · Sertraline to 150 mg daily.   · Encouraged psychotherapy, how to access.   · Discussed risks, benefits, and alternatives to treatment plan documented above with patient. I answered all patient questions related to this plan and patient expressed understanding and agreement.     Return to Clinic: 2 months    MOISES Painting MD  Psychiatry  [facility]  [address] , [place] [address]  [phone]

## 2020-12-18 ENCOUNTER — PATIENT OUTREACH (OUTPATIENT)
Dept: ADMINISTRATIVE | Facility: HOSPITAL | Age: 60
End: 2020-12-18

## 2020-12-28 ENCOUNTER — PATIENT MESSAGE (OUTPATIENT)
Dept: INTERNAL MEDICINE | Facility: CLINIC | Age: 60
End: 2020-12-28

## 2021-01-21 ENCOUNTER — PATIENT MESSAGE (OUTPATIENT)
Dept: INTERNAL MEDICINE | Facility: CLINIC | Age: 61
End: 2021-01-21

## 2021-03-03 ENCOUNTER — PATIENT OUTREACH (OUTPATIENT)
Dept: ADMINISTRATIVE | Facility: HOSPITAL | Age: 61
End: 2021-03-03

## 2021-03-10 ENCOUNTER — OFFICE VISIT (OUTPATIENT)
Dept: PSYCHIATRY | Facility: CLINIC | Age: 61
End: 2021-03-10
Payer: COMMERCIAL

## 2021-03-10 VITALS — WEIGHT: 213.88 LBS | BODY MASS INDEX: 31.58 KG/M2

## 2021-03-10 DIAGNOSIS — F41.9 ANXIETY: Primary | ICD-10-CM

## 2021-03-10 DIAGNOSIS — G47.00 INSOMNIA, UNSPECIFIED TYPE: ICD-10-CM

## 2021-03-10 PROCEDURE — 3008F PR BODY MASS INDEX (BMI) DOCUMENTED: ICD-10-PCS | Mod: CPTII,S$GLB,, | Performed by: PSYCHIATRY & NEUROLOGY

## 2021-03-10 PROCEDURE — 99214 PR OFFICE/OUTPT VISIT, EST, LEVL IV, 30-39 MIN: ICD-10-PCS | Mod: S$GLB,,, | Performed by: PSYCHIATRY & NEUROLOGY

## 2021-03-10 PROCEDURE — 99999 PR PBB SHADOW E&M-EST. PATIENT-LVL I: ICD-10-PCS | Mod: PBBFAC,,, | Performed by: PSYCHIATRY & NEUROLOGY

## 2021-03-10 PROCEDURE — 99214 OFFICE O/P EST MOD 30 MIN: CPT | Mod: S$GLB,,, | Performed by: PSYCHIATRY & NEUROLOGY

## 2021-03-10 PROCEDURE — 3008F BODY MASS INDEX DOCD: CPT | Mod: CPTII,S$GLB,, | Performed by: PSYCHIATRY & NEUROLOGY

## 2021-03-10 PROCEDURE — 99999 PR PBB SHADOW E&M-EST. PATIENT-LVL I: CPT | Mod: PBBFAC,,, | Performed by: PSYCHIATRY & NEUROLOGY

## 2021-03-10 RX ORDER — TRAZODONE HYDROCHLORIDE 100 MG/1
TABLET ORAL
Qty: 30 TABLET | Refills: 1 | Status: SHIPPED | OUTPATIENT
Start: 2021-03-10 | End: 2021-05-07

## 2021-03-10 RX ORDER — SERTRALINE HYDROCHLORIDE 100 MG/1
TABLET, FILM COATED ORAL
Qty: 60 TABLET | Refills: 1 | Status: SHIPPED | OUTPATIENT
Start: 2021-03-10 | End: 2021-05-07 | Stop reason: SDUPTHER

## 2021-03-30 ENCOUNTER — IMMUNIZATION (OUTPATIENT)
Dept: PRIMARY CARE CLINIC | Facility: CLINIC | Age: 61
End: 2021-03-30

## 2021-03-30 DIAGNOSIS — Z23 NEED FOR VACCINATION: Primary | ICD-10-CM

## 2021-03-30 PROCEDURE — 91300 COVID-19, MRNA, LNP-S, PF, 30 MCG/0.3 ML DOSE VACCINE: CPT | Mod: S$GLB,,, | Performed by: FAMILY MEDICINE

## 2021-03-30 PROCEDURE — 0001A COVID-19, MRNA, LNP-S, PF, 30 MCG/0.3 ML DOSE VACCINE: ICD-10-PCS | Mod: CV19,S$GLB,, | Performed by: FAMILY MEDICINE

## 2021-03-30 PROCEDURE — 91300 COVID-19, MRNA, LNP-S, PF, 30 MCG/0.3 ML DOSE VACCINE: ICD-10-PCS | Mod: S$GLB,,, | Performed by: FAMILY MEDICINE

## 2021-03-30 PROCEDURE — 0001A COVID-19, MRNA, LNP-S, PF, 30 MCG/0.3 ML DOSE VACCINE: CPT | Mod: CV19,S$GLB,, | Performed by: FAMILY MEDICINE

## 2021-04-20 ENCOUNTER — IMMUNIZATION (OUTPATIENT)
Dept: PRIMARY CARE CLINIC | Facility: CLINIC | Age: 61
End: 2021-04-20

## 2021-04-20 DIAGNOSIS — Z23 NEED FOR VACCINATION: Primary | ICD-10-CM

## 2021-04-20 PROCEDURE — 91300 COVID-19, MRNA, LNP-S, PF, 30 MCG/0.3 ML DOSE VACCINE: ICD-10-PCS | Mod: S$GLB,,, | Performed by: FAMILY MEDICINE

## 2021-04-20 PROCEDURE — 0002A COVID-19, MRNA, LNP-S, PF, 30 MCG/0.3 ML DOSE VACCINE: ICD-10-PCS | Mod: CV19,S$GLB,, | Performed by: FAMILY MEDICINE

## 2021-04-20 PROCEDURE — 91300 COVID-19, MRNA, LNP-S, PF, 30 MCG/0.3 ML DOSE VACCINE: CPT | Mod: S$GLB,,, | Performed by: FAMILY MEDICINE

## 2021-04-20 PROCEDURE — 0002A COVID-19, MRNA, LNP-S, PF, 30 MCG/0.3 ML DOSE VACCINE: CPT | Mod: CV19,S$GLB,, | Performed by: FAMILY MEDICINE

## 2021-05-07 ENCOUNTER — OFFICE VISIT (OUTPATIENT)
Dept: PSYCHIATRY | Facility: CLINIC | Age: 61
End: 2021-05-07
Payer: COMMERCIAL

## 2021-05-07 VITALS
BODY MASS INDEX: 30.83 KG/M2 | SYSTOLIC BLOOD PRESSURE: 145 MMHG | HEART RATE: 69 BPM | WEIGHT: 208.75 LBS | DIASTOLIC BLOOD PRESSURE: 91 MMHG

## 2021-05-07 DIAGNOSIS — G47.00 INSOMNIA, UNSPECIFIED TYPE: ICD-10-CM

## 2021-05-07 DIAGNOSIS — F41.9 ANXIETY: Primary | ICD-10-CM

## 2021-05-07 PROCEDURE — 99214 PR OFFICE/OUTPT VISIT, EST, LEVL IV, 30-39 MIN: ICD-10-PCS | Mod: S$GLB,,, | Performed by: PSYCHIATRY & NEUROLOGY

## 2021-05-07 PROCEDURE — 99999 PR PBB SHADOW E&M-EST. PATIENT-LVL II: ICD-10-PCS | Mod: PBBFAC,,, | Performed by: PSYCHIATRY & NEUROLOGY

## 2021-05-07 PROCEDURE — 99999 PR PBB SHADOW E&M-EST. PATIENT-LVL II: CPT | Mod: PBBFAC,,, | Performed by: PSYCHIATRY & NEUROLOGY

## 2021-05-07 PROCEDURE — 3008F BODY MASS INDEX DOCD: CPT | Mod: CPTII,S$GLB,, | Performed by: PSYCHIATRY & NEUROLOGY

## 2021-05-07 PROCEDURE — 99214 OFFICE O/P EST MOD 30 MIN: CPT | Mod: S$GLB,,, | Performed by: PSYCHIATRY & NEUROLOGY

## 2021-05-07 PROCEDURE — 3008F PR BODY MASS INDEX (BMI) DOCUMENTED: ICD-10-PCS | Mod: CPTII,S$GLB,, | Performed by: PSYCHIATRY & NEUROLOGY

## 2021-05-07 RX ORDER — SERTRALINE HYDROCHLORIDE 100 MG/1
TABLET, FILM COATED ORAL
Qty: 60 TABLET | Refills: 2 | Status: SHIPPED | OUTPATIENT
Start: 2021-05-07 | End: 2021-06-30 | Stop reason: SDUPTHER

## 2021-05-07 RX ORDER — DOXEPIN HYDROCHLORIDE 10 MG/1
CAPSULE ORAL
Qty: 60 CAPSULE | Refills: 2 | Status: SHIPPED | OUTPATIENT
Start: 2021-05-07 | End: 2021-06-30 | Stop reason: SDUPTHER

## 2021-06-03 ENCOUNTER — PATIENT OUTREACH (OUTPATIENT)
Dept: ADMINISTRATIVE | Facility: HOSPITAL | Age: 61
End: 2021-06-03

## 2021-06-30 ENCOUNTER — OFFICE VISIT (OUTPATIENT)
Dept: PSYCHIATRY | Facility: CLINIC | Age: 61
End: 2021-06-30
Payer: COMMERCIAL

## 2021-06-30 DIAGNOSIS — F41.9 ANXIETY: Primary | ICD-10-CM

## 2021-06-30 DIAGNOSIS — G47.00 INSOMNIA, UNSPECIFIED TYPE: ICD-10-CM

## 2021-06-30 PROCEDURE — 99213 PR OFFICE/OUTPT VISIT, EST, LEVL III, 20-29 MIN: ICD-10-PCS | Mod: S$GLB,,, | Performed by: PSYCHIATRY & NEUROLOGY

## 2021-06-30 PROCEDURE — 99999 PR PBB SHADOW E&M-EST. PATIENT-LVL I: ICD-10-PCS | Mod: PBBFAC,,, | Performed by: PSYCHIATRY & NEUROLOGY

## 2021-06-30 PROCEDURE — 99213 OFFICE O/P EST LOW 20 MIN: CPT | Mod: S$GLB,,, | Performed by: PSYCHIATRY & NEUROLOGY

## 2021-06-30 PROCEDURE — 99999 PR PBB SHADOW E&M-EST. PATIENT-LVL I: CPT | Mod: PBBFAC,,, | Performed by: PSYCHIATRY & NEUROLOGY

## 2021-06-30 RX ORDER — SERTRALINE HYDROCHLORIDE 100 MG/1
TABLET, FILM COATED ORAL
Qty: 60 TABLET | Refills: 2 | Status: SHIPPED | OUTPATIENT
Start: 2021-06-30 | End: 2021-09-28 | Stop reason: SDUPTHER

## 2021-06-30 RX ORDER — DOXEPIN HYDROCHLORIDE 10 MG/1
CAPSULE ORAL
Qty: 60 CAPSULE | Refills: 2 | Status: SHIPPED | OUTPATIENT
Start: 2021-06-30 | End: 2021-09-28 | Stop reason: SDUPTHER

## 2021-08-04 ENCOUNTER — PATIENT MESSAGE (OUTPATIENT)
Dept: ADMINISTRATIVE | Facility: HOSPITAL | Age: 61
End: 2021-08-04

## 2021-08-04 DIAGNOSIS — E11.9 TYPE 2 DIABETES MELLITUS WITHOUT COMPLICATION: ICD-10-CM

## 2021-08-10 ENCOUNTER — PATIENT MESSAGE (OUTPATIENT)
Dept: INTERNAL MEDICINE | Facility: CLINIC | Age: 61
End: 2021-08-10

## 2021-08-16 ENCOUNTER — PATIENT MESSAGE (OUTPATIENT)
Dept: INTERNAL MEDICINE | Facility: CLINIC | Age: 61
End: 2021-08-16

## 2021-08-16 ENCOUNTER — LAB VISIT (OUTPATIENT)
Dept: LAB | Facility: HOSPITAL | Age: 61
End: 2021-08-16
Payer: COMMERCIAL

## 2021-08-16 ENCOUNTER — OFFICE VISIT (OUTPATIENT)
Dept: INTERNAL MEDICINE | Facility: CLINIC | Age: 61
End: 2021-08-16
Payer: COMMERCIAL

## 2021-08-16 ENCOUNTER — LAB VISIT (OUTPATIENT)
Dept: LAB | Facility: HOSPITAL | Age: 61
End: 2021-08-16
Attending: PHYSICIAN ASSISTANT
Payer: COMMERCIAL

## 2021-08-16 VITALS
OXYGEN SATURATION: 97 % | TEMPERATURE: 98 F | DIASTOLIC BLOOD PRESSURE: 80 MMHG | SYSTOLIC BLOOD PRESSURE: 130 MMHG | WEIGHT: 209.44 LBS | HEART RATE: 89 BPM | HEIGHT: 69 IN | BODY MASS INDEX: 31.02 KG/M2

## 2021-08-16 DIAGNOSIS — Z00.00 ANNUAL PHYSICAL EXAM: ICD-10-CM

## 2021-08-16 DIAGNOSIS — E11.9 TYPE 2 DIABETES MELLITUS WITHOUT COMPLICATION, WITHOUT LONG-TERM CURRENT USE OF INSULIN: ICD-10-CM

## 2021-08-16 DIAGNOSIS — E11.9 TYPE 2 DIABETES MELLITUS WITHOUT COMPLICATION, WITHOUT LONG-TERM CURRENT USE OF INSULIN: Primary | ICD-10-CM

## 2021-08-16 DIAGNOSIS — E11.22 TYPE 2 DIABETES MELLITUS WITH STAGE 3B CHRONIC KIDNEY DISEASE, WITHOUT LONG-TERM CURRENT USE OF INSULIN: ICD-10-CM

## 2021-08-16 DIAGNOSIS — B00.9 HERPES SIMPLEX: Primary | ICD-10-CM

## 2021-08-16 DIAGNOSIS — E78.5 DYSLIPIDEMIA ASSOCIATED WITH TYPE 2 DIABETES MELLITUS: Chronic | ICD-10-CM

## 2021-08-16 DIAGNOSIS — N18.32 TYPE 2 DIABETES MELLITUS WITH STAGE 3B CHRONIC KIDNEY DISEASE, WITHOUT LONG-TERM CURRENT USE OF INSULIN: ICD-10-CM

## 2021-08-16 DIAGNOSIS — I10 BENIGN ESSENTIAL HYPERTENSION: Chronic | ICD-10-CM

## 2021-08-16 DIAGNOSIS — Z12.5 SCREENING FOR PROSTATE CANCER: ICD-10-CM

## 2021-08-16 DIAGNOSIS — E11.69 DYSLIPIDEMIA ASSOCIATED WITH TYPE 2 DIABETES MELLITUS: Chronic | ICD-10-CM

## 2021-08-16 LAB
ALBUMIN SERPL BCP-MCNC: 4.1 G/DL (ref 3.5–5.2)
ALP SERPL-CCNC: 87 U/L (ref 55–135)
ALT SERPL W/O P-5'-P-CCNC: 45 U/L (ref 10–44)
ANION GAP SERPL CALC-SCNC: 15 MMOL/L (ref 8–16)
AST SERPL-CCNC: 43 U/L (ref 10–40)
BASOPHILS # BLD AUTO: 0.07 K/UL (ref 0–0.2)
BASOPHILS NFR BLD: 1.3 % (ref 0–1.9)
BILIRUB SERPL-MCNC: 0.9 MG/DL (ref 0.1–1)
BUN SERPL-MCNC: 13 MG/DL (ref 6–20)
CALCIUM SERPL-MCNC: 9.9 MG/DL (ref 8.7–10.5)
CHLORIDE SERPL-SCNC: 98 MMOL/L (ref 95–110)
CHOLEST SERPL-MCNC: 186 MG/DL (ref 120–199)
CHOLEST/HDLC SERPL: 6.2 {RATIO} (ref 2–5)
CO2 SERPL-SCNC: 24 MMOL/L (ref 23–29)
CREAT SERPL-MCNC: 1.2 MG/DL (ref 0.5–1.4)
DIFFERENTIAL METHOD: NORMAL
EOSINOPHIL # BLD AUTO: 0.1 K/UL (ref 0–0.5)
EOSINOPHIL NFR BLD: 2.2 % (ref 0–8)
ERYTHROCYTE [DISTWIDTH] IN BLOOD BY AUTOMATED COUNT: 12.5 % (ref 11.5–14.5)
EST. GFR  (AFRICAN AMERICAN): >60 ML/MIN/1.73 M^2
EST. GFR  (NON AFRICAN AMERICAN): >60 ML/MIN/1.73 M^2
GLUCOSE SERPL-MCNC: 259 MG/DL (ref 70–110)
HCT VFR BLD AUTO: 44.6 % (ref 40–54)
HDLC SERPL-MCNC: 30 MG/DL (ref 40–75)
HDLC SERPL: 16.1 % (ref 20–50)
HGB BLD-MCNC: 15.5 G/DL (ref 14–18)
IMM GRANULOCYTES # BLD AUTO: 0.02 K/UL (ref 0–0.04)
IMM GRANULOCYTES NFR BLD AUTO: 0.4 % (ref 0–0.5)
LDLC SERPL CALC-MCNC: 114.2 MG/DL (ref 63–159)
LYMPHOCYTES # BLD AUTO: 1.6 K/UL (ref 1–4.8)
LYMPHOCYTES NFR BLD: 29.2 % (ref 18–48)
MCH RBC QN AUTO: 29.8 PG (ref 27–31)
MCHC RBC AUTO-ENTMCNC: 34.8 G/DL (ref 32–36)
MCV RBC AUTO: 86 FL (ref 82–98)
MONOCYTES # BLD AUTO: 0.5 K/UL (ref 0.3–1)
MONOCYTES NFR BLD: 8.6 % (ref 4–15)
NEUTROPHILS # BLD AUTO: 3.2 K/UL (ref 1.8–7.7)
NEUTROPHILS NFR BLD: 58.3 % (ref 38–73)
NONHDLC SERPL-MCNC: 156 MG/DL
NRBC BLD-RTO: 0 /100 WBC
PLATELET # BLD AUTO: 279 K/UL (ref 150–450)
PMV BLD AUTO: 10.2 FL (ref 9.2–12.9)
POTASSIUM SERPL-SCNC: 3.9 MMOL/L (ref 3.5–5.1)
PROT SERPL-MCNC: 7.5 G/DL (ref 6–8.4)
RBC # BLD AUTO: 5.21 M/UL (ref 4.6–6.2)
SODIUM SERPL-SCNC: 137 MMOL/L (ref 136–145)
TRIGL SERPL-MCNC: 209 MG/DL (ref 30–150)
WBC # BLD AUTO: 5.48 K/UL (ref 3.9–12.7)

## 2021-08-16 PROCEDURE — 1126F PR PAIN SEVERITY QUANTIFIED, NO PAIN PRESENT: ICD-10-PCS | Mod: CPTII,S$GLB,, | Performed by: PHYSICIAN ASSISTANT

## 2021-08-16 PROCEDURE — 3075F PR MOST RECENT SYSTOLIC BLOOD PRESS GE 130-139MM HG: ICD-10-PCS | Mod: CPTII,S$GLB,, | Performed by: PHYSICIAN ASSISTANT

## 2021-08-16 PROCEDURE — 3079F DIAST BP 80-89 MM HG: CPT | Mod: CPTII,S$GLB,, | Performed by: PHYSICIAN ASSISTANT

## 2021-08-16 PROCEDURE — 80053 COMPREHEN METABOLIC PANEL: CPT | Performed by: PHYSICIAN ASSISTANT

## 2021-08-16 PROCEDURE — 83036 HEMOGLOBIN GLYCOSYLATED A1C: CPT | Performed by: PHYSICIAN ASSISTANT

## 2021-08-16 PROCEDURE — 99214 OFFICE O/P EST MOD 30 MIN: CPT | Mod: S$GLB,,, | Performed by: PHYSICIAN ASSISTANT

## 2021-08-16 PROCEDURE — 99214 PR OFFICE/OUTPT VISIT, EST, LEVL IV, 30-39 MIN: ICD-10-PCS | Mod: S$GLB,,, | Performed by: PHYSICIAN ASSISTANT

## 2021-08-16 PROCEDURE — 36415 COLL VENOUS BLD VENIPUNCTURE: CPT | Performed by: PHYSICIAN ASSISTANT

## 2021-08-16 PROCEDURE — 3075F SYST BP GE 130 - 139MM HG: CPT | Mod: CPTII,S$GLB,, | Performed by: PHYSICIAN ASSISTANT

## 2021-08-16 PROCEDURE — 1126F AMNT PAIN NOTED NONE PRSNT: CPT | Mod: CPTII,S$GLB,, | Performed by: PHYSICIAN ASSISTANT

## 2021-08-16 PROCEDURE — 3052F HG A1C>EQUAL 8.0%<EQUAL 9.0%: CPT | Mod: CPTII,S$GLB,, | Performed by: PHYSICIAN ASSISTANT

## 2021-08-16 PROCEDURE — 3052F PR MOST RECENT HEMOGLOBIN A1C LEVEL 8.0 - < 9.0%: ICD-10-PCS | Mod: CPTII,S$GLB,, | Performed by: PHYSICIAN ASSISTANT

## 2021-08-16 PROCEDURE — 3079F PR MOST RECENT DIASTOLIC BLOOD PRESSURE 80-89 MM HG: ICD-10-PCS | Mod: CPTII,S$GLB,, | Performed by: PHYSICIAN ASSISTANT

## 2021-08-16 PROCEDURE — 3008F PR BODY MASS INDEX (BMI) DOCUMENTED: ICD-10-PCS | Mod: CPTII,S$GLB,, | Performed by: PHYSICIAN ASSISTANT

## 2021-08-16 PROCEDURE — 84153 ASSAY OF PSA TOTAL: CPT | Performed by: PHYSICIAN ASSISTANT

## 2021-08-16 PROCEDURE — 99999 PR PBB SHADOW E&M-EST. PATIENT-LVL IV: CPT | Mod: PBBFAC,,, | Performed by: PHYSICIAN ASSISTANT

## 2021-08-16 PROCEDURE — 1159F MED LIST DOCD IN RCRD: CPT | Mod: CPTII,S$GLB,, | Performed by: PHYSICIAN ASSISTANT

## 2021-08-16 PROCEDURE — 99999 PR PBB SHADOW E&M-EST. PATIENT-LVL IV: ICD-10-PCS | Mod: PBBFAC,,, | Performed by: PHYSICIAN ASSISTANT

## 2021-08-16 PROCEDURE — 85025 COMPLETE CBC W/AUTO DIFF WBC: CPT | Performed by: PHYSICIAN ASSISTANT

## 2021-08-16 PROCEDURE — 1160F PR REVIEW ALL MEDS BY PRESCRIBER/CLIN PHARMACIST DOCUMENTED: ICD-10-PCS | Mod: CPTII,S$GLB,, | Performed by: PHYSICIAN ASSISTANT

## 2021-08-16 PROCEDURE — 1160F RVW MEDS BY RX/DR IN RCRD: CPT | Mod: CPTII,S$GLB,, | Performed by: PHYSICIAN ASSISTANT

## 2021-08-16 PROCEDURE — 1159F PR MEDICATION LIST DOCUMENTED IN MEDICAL RECORD: ICD-10-PCS | Mod: CPTII,S$GLB,, | Performed by: PHYSICIAN ASSISTANT

## 2021-08-16 PROCEDURE — 82570 ASSAY OF URINE CREATININE: CPT | Performed by: PHYSICIAN ASSISTANT

## 2021-08-16 PROCEDURE — 3008F BODY MASS INDEX DOCD: CPT | Mod: CPTII,S$GLB,, | Performed by: PHYSICIAN ASSISTANT

## 2021-08-16 PROCEDURE — 80061 LIPID PANEL: CPT | Performed by: PHYSICIAN ASSISTANT

## 2021-08-16 RX ORDER — AMLODIPINE BESYLATE 10 MG/1
10 TABLET ORAL DAILY
Qty: 90 TABLET | Refills: 4 | Status: SHIPPED | OUTPATIENT
Start: 2021-08-16 | End: 2022-08-16

## 2021-08-16 RX ORDER — ROSUVASTATIN CALCIUM 40 MG/1
40 TABLET, COATED ORAL NIGHTLY
Qty: 90 TABLET | Refills: 3 | Status: SHIPPED | OUTPATIENT
Start: 2021-08-16 | End: 2022-08-16

## 2021-08-16 RX ORDER — LOSARTAN POTASSIUM AND HYDROCHLOROTHIAZIDE 25; 100 MG/1; MG/1
1 TABLET ORAL DAILY
Qty: 90 TABLET | Refills: 14 | Status: SHIPPED | OUTPATIENT
Start: 2021-08-16 | End: 2022-07-22 | Stop reason: SDUPTHER

## 2021-08-16 RX ORDER — VALACYCLOVIR HYDROCHLORIDE 500 MG/1
500 TABLET, FILM COATED ORAL 2 TIMES DAILY
Qty: 6 TABLET | Refills: 3 | Status: SHIPPED | OUTPATIENT
Start: 2021-08-16 | End: 2021-08-19

## 2021-08-16 RX ORDER — EMPAGLIFLOZIN 10 MG/1
10 TABLET, FILM COATED ORAL DAILY
Qty: 90 TABLET | Refills: 1 | Status: SHIPPED | OUTPATIENT
Start: 2021-08-16 | End: 2022-02-01 | Stop reason: SDUPTHER

## 2021-08-17 ENCOUNTER — TELEPHONE (OUTPATIENT)
Dept: INTERNAL MEDICINE | Facility: CLINIC | Age: 61
End: 2021-08-17

## 2021-08-17 ENCOUNTER — TELEPHONE (OUTPATIENT)
Dept: DIABETES | Facility: CLINIC | Age: 61
End: 2021-08-17

## 2021-08-17 DIAGNOSIS — E11.65 UNCONTROLLED TYPE 2 DIABETES MELLITUS WITH HYPERGLYCEMIA: Primary | ICD-10-CM

## 2021-08-17 LAB
ALBUMIN/CREAT UR: NORMAL UG/MG (ref 0–30)
COMPLEXED PSA SERPL-MCNC: 1.8 NG/ML (ref 0–4)
CREAT UR-MCNC: 37 MG/DL (ref 23–375)
ESTIMATED AVG GLUCOSE: 332 MG/DL (ref 68–131)
HBA1C MFR BLD: 13.2 % (ref 4–5.6)
MICROALBUMIN UR DL<=1MG/L-MCNC: <5 UG/ML

## 2021-08-18 ENCOUNTER — TELEPHONE (OUTPATIENT)
Dept: PHARMACY | Facility: CLINIC | Age: 61
End: 2021-08-18

## 2021-08-26 ENCOUNTER — PATIENT MESSAGE (OUTPATIENT)
Dept: DIABETES | Facility: CLINIC | Age: 61
End: 2021-08-26

## 2021-09-16 ENCOUNTER — OFFICE VISIT (OUTPATIENT)
Dept: INTERNAL MEDICINE | Facility: CLINIC | Age: 61
End: 2021-09-16
Payer: COMMERCIAL

## 2021-09-16 VITALS
TEMPERATURE: 97 F | DIASTOLIC BLOOD PRESSURE: 64 MMHG | BODY MASS INDEX: 29.61 KG/M2 | HEIGHT: 69 IN | SYSTOLIC BLOOD PRESSURE: 118 MMHG | HEART RATE: 72 BPM | OXYGEN SATURATION: 97 % | WEIGHT: 199.94 LBS

## 2021-09-16 DIAGNOSIS — I10 BENIGN ESSENTIAL HYPERTENSION: Chronic | ICD-10-CM

## 2021-09-16 DIAGNOSIS — E78.5 DYSLIPIDEMIA ASSOCIATED WITH TYPE 2 DIABETES MELLITUS: Chronic | ICD-10-CM

## 2021-09-16 DIAGNOSIS — N18.32 TYPE 2 DIABETES MELLITUS WITH STAGE 3B CHRONIC KIDNEY DISEASE, WITHOUT LONG-TERM CURRENT USE OF INSULIN: Primary | Chronic | ICD-10-CM

## 2021-09-16 DIAGNOSIS — K21.9 GASTROESOPHAGEAL REFLUX DISEASE, UNSPECIFIED WHETHER ESOPHAGITIS PRESENT: ICD-10-CM

## 2021-09-16 DIAGNOSIS — F41.1 GENERALIZED ANXIETY DISORDER: Chronic | ICD-10-CM

## 2021-09-16 DIAGNOSIS — E11.22 TYPE 2 DIABETES MELLITUS WITH STAGE 3B CHRONIC KIDNEY DISEASE, WITHOUT LONG-TERM CURRENT USE OF INSULIN: Primary | Chronic | ICD-10-CM

## 2021-09-16 DIAGNOSIS — R45.86 MOOD SWINGS: ICD-10-CM

## 2021-09-16 DIAGNOSIS — E11.69 DYSLIPIDEMIA ASSOCIATED WITH TYPE 2 DIABETES MELLITUS: Chronic | ICD-10-CM

## 2021-09-16 PROCEDURE — 3074F SYST BP LT 130 MM HG: CPT | Mod: CPTII,S$GLB,, | Performed by: PHYSICIAN ASSISTANT

## 2021-09-16 PROCEDURE — 3066F PR DOCUMENTATION OF TREATMENT FOR NEPHROPATHY: ICD-10-PCS | Mod: CPTII,S$GLB,, | Performed by: PHYSICIAN ASSISTANT

## 2021-09-16 PROCEDURE — 99999 PR PBB SHADOW E&M-EST. PATIENT-LVL IV: ICD-10-PCS | Mod: PBBFAC,,, | Performed by: PHYSICIAN ASSISTANT

## 2021-09-16 PROCEDURE — 3078F PR MOST RECENT DIASTOLIC BLOOD PRESSURE < 80 MM HG: ICD-10-PCS | Mod: CPTII,S$GLB,, | Performed by: PHYSICIAN ASSISTANT

## 2021-09-16 PROCEDURE — 1160F PR REVIEW ALL MEDS BY PRESCRIBER/CLIN PHARMACIST DOCUMENTED: ICD-10-PCS | Mod: CPTII,S$GLB,, | Performed by: PHYSICIAN ASSISTANT

## 2021-09-16 PROCEDURE — 3046F HEMOGLOBIN A1C LEVEL >9.0%: CPT | Mod: CPTII,S$GLB,, | Performed by: PHYSICIAN ASSISTANT

## 2021-09-16 PROCEDURE — 3008F BODY MASS INDEX DOCD: CPT | Mod: CPTII,S$GLB,, | Performed by: PHYSICIAN ASSISTANT

## 2021-09-16 PROCEDURE — 1159F MED LIST DOCD IN RCRD: CPT | Mod: CPTII,S$GLB,, | Performed by: PHYSICIAN ASSISTANT

## 2021-09-16 PROCEDURE — 99213 PR OFFICE/OUTPT VISIT, EST, LEVL III, 20-29 MIN: ICD-10-PCS | Mod: S$GLB,,, | Performed by: PHYSICIAN ASSISTANT

## 2021-09-16 PROCEDURE — 99999 PR PBB SHADOW E&M-EST. PATIENT-LVL IV: CPT | Mod: PBBFAC,,, | Performed by: PHYSICIAN ASSISTANT

## 2021-09-16 PROCEDURE — 3074F PR MOST RECENT SYSTOLIC BLOOD PRESSURE < 130 MM HG: ICD-10-PCS | Mod: CPTII,S$GLB,, | Performed by: PHYSICIAN ASSISTANT

## 2021-09-16 PROCEDURE — 1160F RVW MEDS BY RX/DR IN RCRD: CPT | Mod: CPTII,S$GLB,, | Performed by: PHYSICIAN ASSISTANT

## 2021-09-16 PROCEDURE — 3066F NEPHROPATHY DOC TX: CPT | Mod: CPTII,S$GLB,, | Performed by: PHYSICIAN ASSISTANT

## 2021-09-16 PROCEDURE — 1159F PR MEDICATION LIST DOCUMENTED IN MEDICAL RECORD: ICD-10-PCS | Mod: CPTII,S$GLB,, | Performed by: PHYSICIAN ASSISTANT

## 2021-09-16 PROCEDURE — 3008F PR BODY MASS INDEX (BMI) DOCUMENTED: ICD-10-PCS | Mod: CPTII,S$GLB,, | Performed by: PHYSICIAN ASSISTANT

## 2021-09-16 PROCEDURE — 3061F NEG MICROALBUMINURIA REV: CPT | Mod: CPTII,S$GLB,, | Performed by: PHYSICIAN ASSISTANT

## 2021-09-16 PROCEDURE — 99213 OFFICE O/P EST LOW 20 MIN: CPT | Mod: S$GLB,,, | Performed by: PHYSICIAN ASSISTANT

## 2021-09-16 PROCEDURE — 3046F PR MOST RECENT HEMOGLOBIN A1C LEVEL > 9.0%: ICD-10-PCS | Mod: CPTII,S$GLB,, | Performed by: PHYSICIAN ASSISTANT

## 2021-09-16 PROCEDURE — 3061F PR NEG MICROALBUMINURIA RESULT DOCUMENTED/REVIEW: ICD-10-PCS | Mod: CPTII,S$GLB,, | Performed by: PHYSICIAN ASSISTANT

## 2021-09-16 PROCEDURE — 3078F DIAST BP <80 MM HG: CPT | Mod: CPTII,S$GLB,, | Performed by: PHYSICIAN ASSISTANT

## 2021-09-16 RX ORDER — PANTOPRAZOLE SODIUM 40 MG/1
40 TABLET, DELAYED RELEASE ORAL DAILY
Qty: 90 TABLET | Refills: 3 | Status: SHIPPED | OUTPATIENT
Start: 2021-09-16 | End: 2022-10-14

## 2021-09-16 RX ORDER — PANTOPRAZOLE SODIUM 40 MG/1
40 TABLET, DELAYED RELEASE ORAL DAILY
Qty: 90 TABLET | Refills: 1 | Status: SHIPPED | OUTPATIENT
Start: 2021-09-16 | End: 2021-09-16 | Stop reason: SDUPTHER

## 2021-09-16 RX ORDER — DULAGLUTIDE 1.5 MG/.5ML
1.5 INJECTION, SOLUTION SUBCUTANEOUS
Qty: 12 PEN | Refills: 3 | Status: SHIPPED | OUTPATIENT
Start: 2021-09-16 | End: 2022-01-07 | Stop reason: DRUGHIGH

## 2021-09-16 RX ORDER — PANTOPRAZOLE SODIUM 40 MG/1
40 TABLET, DELAYED RELEASE ORAL DAILY
Qty: 90 TABLET | Refills: 3 | OUTPATIENT
Start: 2021-09-16 | End: 2021-09-16 | Stop reason: SDUPTHER

## 2021-09-28 ENCOUNTER — OFFICE VISIT (OUTPATIENT)
Dept: PSYCHIATRY | Facility: CLINIC | Age: 61
End: 2021-09-28
Payer: COMMERCIAL

## 2021-09-28 DIAGNOSIS — F41.9 ANXIETY: Primary | ICD-10-CM

## 2021-09-28 DIAGNOSIS — G47.00 INSOMNIA, UNSPECIFIED TYPE: ICD-10-CM

## 2021-09-28 PROCEDURE — 3061F PR NEG MICROALBUMINURIA RESULT DOCUMENTED/REVIEW: ICD-10-PCS | Mod: CPTII,S$GLB,, | Performed by: PSYCHIATRY & NEUROLOGY

## 2021-09-28 PROCEDURE — 3046F HEMOGLOBIN A1C LEVEL >9.0%: CPT | Mod: CPTII,S$GLB,, | Performed by: PSYCHIATRY & NEUROLOGY

## 2021-09-28 PROCEDURE — 3066F NEPHROPATHY DOC TX: CPT | Mod: CPTII,S$GLB,, | Performed by: PSYCHIATRY & NEUROLOGY

## 2021-09-28 PROCEDURE — 3066F PR DOCUMENTATION OF TREATMENT FOR NEPHROPATHY: ICD-10-PCS | Mod: CPTII,S$GLB,, | Performed by: PSYCHIATRY & NEUROLOGY

## 2021-09-28 PROCEDURE — 99213 OFFICE O/P EST LOW 20 MIN: CPT | Mod: S$GLB,,, | Performed by: PSYCHIATRY & NEUROLOGY

## 2021-09-28 PROCEDURE — 99999 PR PBB SHADOW E&M-EST. PATIENT-LVL I: ICD-10-PCS | Mod: PBBFAC,,, | Performed by: PSYCHIATRY & NEUROLOGY

## 2021-09-28 PROCEDURE — 99213 PR OFFICE/OUTPT VISIT, EST, LEVL III, 20-29 MIN: ICD-10-PCS | Mod: S$GLB,,, | Performed by: PSYCHIATRY & NEUROLOGY

## 2021-09-28 PROCEDURE — 3061F NEG MICROALBUMINURIA REV: CPT | Mod: CPTII,S$GLB,, | Performed by: PSYCHIATRY & NEUROLOGY

## 2021-09-28 PROCEDURE — 99999 PR PBB SHADOW E&M-EST. PATIENT-LVL I: CPT | Mod: PBBFAC,,, | Performed by: PSYCHIATRY & NEUROLOGY

## 2021-09-28 PROCEDURE — 3046F PR MOST RECENT HEMOGLOBIN A1C LEVEL > 9.0%: ICD-10-PCS | Mod: CPTII,S$GLB,, | Performed by: PSYCHIATRY & NEUROLOGY

## 2021-09-28 RX ORDER — SERTRALINE HYDROCHLORIDE 100 MG/1
TABLET, FILM COATED ORAL
Qty: 60 TABLET | Refills: 2 | Status: SHIPPED | OUTPATIENT
Start: 2021-09-28 | End: 2022-01-27 | Stop reason: SDUPTHER

## 2021-09-28 RX ORDER — DOXEPIN HYDROCHLORIDE 10 MG/1
CAPSULE ORAL
Qty: 60 CAPSULE | Refills: 2 | Status: SHIPPED | OUTPATIENT
Start: 2021-09-28 | End: 2022-01-27 | Stop reason: SDUPTHER

## 2021-09-29 ENCOUNTER — OFFICE VISIT (OUTPATIENT)
Dept: INTERNAL MEDICINE | Facility: CLINIC | Age: 61
End: 2021-09-29
Payer: COMMERCIAL

## 2021-09-29 ENCOUNTER — PATIENT MESSAGE (OUTPATIENT)
Dept: INTERNAL MEDICINE | Facility: CLINIC | Age: 61
End: 2021-09-29

## 2021-09-29 VITALS
OXYGEN SATURATION: 100 % | WEIGHT: 205.5 LBS | HEIGHT: 69 IN | BODY MASS INDEX: 30.44 KG/M2 | DIASTOLIC BLOOD PRESSURE: 78 MMHG | TEMPERATURE: 100 F | HEART RATE: 93 BPM | SYSTOLIC BLOOD PRESSURE: 130 MMHG

## 2021-09-29 DIAGNOSIS — I10 BENIGN ESSENTIAL HYPERTENSION: Chronic | ICD-10-CM

## 2021-09-29 DIAGNOSIS — E11.22 TYPE 2 DIABETES MELLITUS WITH STAGE 3B CHRONIC KIDNEY DISEASE, WITHOUT LONG-TERM CURRENT USE OF INSULIN: Chronic | ICD-10-CM

## 2021-09-29 DIAGNOSIS — J06.9 VIRAL URI: Primary | ICD-10-CM

## 2021-09-29 DIAGNOSIS — N18.32 TYPE 2 DIABETES MELLITUS WITH STAGE 3B CHRONIC KIDNEY DISEASE, WITHOUT LONG-TERM CURRENT USE OF INSULIN: Chronic | ICD-10-CM

## 2021-09-29 PROCEDURE — 3046F PR MOST RECENT HEMOGLOBIN A1C LEVEL > 9.0%: ICD-10-PCS | Mod: CPTII,S$GLB,, | Performed by: PHYSICIAN ASSISTANT

## 2021-09-29 PROCEDURE — 1159F PR MEDICATION LIST DOCUMENTED IN MEDICAL RECORD: ICD-10-PCS | Mod: CPTII,S$GLB,, | Performed by: PHYSICIAN ASSISTANT

## 2021-09-29 PROCEDURE — 3061F PR NEG MICROALBUMINURIA RESULT DOCUMENTED/REVIEW: ICD-10-PCS | Mod: CPTII,S$GLB,, | Performed by: PHYSICIAN ASSISTANT

## 2021-09-29 PROCEDURE — 3066F PR DOCUMENTATION OF TREATMENT FOR NEPHROPATHY: ICD-10-PCS | Mod: CPTII,S$GLB,, | Performed by: PHYSICIAN ASSISTANT

## 2021-09-29 PROCEDURE — 3061F NEG MICROALBUMINURIA REV: CPT | Mod: CPTII,S$GLB,, | Performed by: PHYSICIAN ASSISTANT

## 2021-09-29 PROCEDURE — 3078F PR MOST RECENT DIASTOLIC BLOOD PRESSURE < 80 MM HG: ICD-10-PCS | Mod: CPTII,S$GLB,, | Performed by: PHYSICIAN ASSISTANT

## 2021-09-29 PROCEDURE — 3008F BODY MASS INDEX DOCD: CPT | Mod: CPTII,S$GLB,, | Performed by: PHYSICIAN ASSISTANT

## 2021-09-29 PROCEDURE — 3078F DIAST BP <80 MM HG: CPT | Mod: CPTII,S$GLB,, | Performed by: PHYSICIAN ASSISTANT

## 2021-09-29 PROCEDURE — 99214 PR OFFICE/OUTPT VISIT, EST, LEVL IV, 30-39 MIN: ICD-10-PCS | Mod: S$GLB,,, | Performed by: PHYSICIAN ASSISTANT

## 2021-09-29 PROCEDURE — 3046F HEMOGLOBIN A1C LEVEL >9.0%: CPT | Mod: CPTII,S$GLB,, | Performed by: PHYSICIAN ASSISTANT

## 2021-09-29 PROCEDURE — 1160F RVW MEDS BY RX/DR IN RCRD: CPT | Mod: CPTII,S$GLB,, | Performed by: PHYSICIAN ASSISTANT

## 2021-09-29 PROCEDURE — 1160F PR REVIEW ALL MEDS BY PRESCRIBER/CLIN PHARMACIST DOCUMENTED: ICD-10-PCS | Mod: CPTII,S$GLB,, | Performed by: PHYSICIAN ASSISTANT

## 2021-09-29 PROCEDURE — 3075F PR MOST RECENT SYSTOLIC BLOOD PRESS GE 130-139MM HG: ICD-10-PCS | Mod: CPTII,S$GLB,, | Performed by: PHYSICIAN ASSISTANT

## 2021-09-29 PROCEDURE — 3008F PR BODY MASS INDEX (BMI) DOCUMENTED: ICD-10-PCS | Mod: CPTII,S$GLB,, | Performed by: PHYSICIAN ASSISTANT

## 2021-09-29 PROCEDURE — 3066F NEPHROPATHY DOC TX: CPT | Mod: CPTII,S$GLB,, | Performed by: PHYSICIAN ASSISTANT

## 2021-09-29 PROCEDURE — 1159F MED LIST DOCD IN RCRD: CPT | Mod: CPTII,S$GLB,, | Performed by: PHYSICIAN ASSISTANT

## 2021-09-29 PROCEDURE — 99214 OFFICE O/P EST MOD 30 MIN: CPT | Mod: S$GLB,,, | Performed by: PHYSICIAN ASSISTANT

## 2021-09-29 PROCEDURE — 3075F SYST BP GE 130 - 139MM HG: CPT | Mod: CPTII,S$GLB,, | Performed by: PHYSICIAN ASSISTANT

## 2021-09-29 PROCEDURE — 99999 PR PBB SHADOW E&M-EST. PATIENT-LVL IV: ICD-10-PCS | Mod: PBBFAC,,, | Performed by: PHYSICIAN ASSISTANT

## 2021-09-29 PROCEDURE — 99999 PR PBB SHADOW E&M-EST. PATIENT-LVL IV: CPT | Mod: PBBFAC,,, | Performed by: PHYSICIAN ASSISTANT

## 2021-10-01 ENCOUNTER — PATIENT MESSAGE (OUTPATIENT)
Dept: INTERNAL MEDICINE | Facility: CLINIC | Age: 61
End: 2021-10-01

## 2021-10-01 DIAGNOSIS — J01.90 ACUTE SINUSITIS, RECURRENCE NOT SPECIFIED, UNSPECIFIED LOCATION: Primary | ICD-10-CM

## 2021-10-01 RX ORDER — DOXYCYCLINE 100 MG/1
100 CAPSULE ORAL 2 TIMES DAILY
Qty: 14 CAPSULE | Refills: 0 | Status: SHIPPED | OUTPATIENT
Start: 2021-10-01 | End: 2021-10-08

## 2021-10-06 ENCOUNTER — PATIENT MESSAGE (OUTPATIENT)
Dept: INTERNAL MEDICINE | Facility: CLINIC | Age: 61
End: 2021-10-06

## 2021-10-08 ENCOUNTER — PATIENT OUTREACH (OUTPATIENT)
Dept: ADMINISTRATIVE | Facility: OTHER | Age: 61
End: 2021-10-08

## 2021-10-11 ENCOUNTER — CLINICAL SUPPORT (OUTPATIENT)
Dept: DIABETES | Facility: CLINIC | Age: 61
End: 2021-10-11
Payer: COMMERCIAL

## 2021-10-11 VITALS — BODY MASS INDEX: 29.88 KG/M2 | HEIGHT: 69 IN | WEIGHT: 201.75 LBS

## 2021-10-11 DIAGNOSIS — E11.65 UNCONTROLLED TYPE 2 DIABETES MELLITUS WITH HYPERGLYCEMIA: ICD-10-CM

## 2021-10-11 DIAGNOSIS — E11.65 TYPE 2 DIABETES MELLITUS WITH HYPERGLYCEMIA, WITHOUT LONG-TERM CURRENT USE OF INSULIN: Primary | ICD-10-CM

## 2021-10-11 PROCEDURE — G0108 PR DIAB MANAGE TRN  PER INDIV: ICD-10-PCS | Mod: S$GLB,,, | Performed by: DIETITIAN, REGISTERED

## 2021-10-11 PROCEDURE — 99999 PR PBB SHADOW E&M-EST. PATIENT-LVL II: ICD-10-PCS | Mod: PBBFAC,,, | Performed by: DIETITIAN, REGISTERED

## 2021-10-11 PROCEDURE — G0108 DIAB MANAGE TRN  PER INDIV: HCPCS | Mod: S$GLB,,, | Performed by: DIETITIAN, REGISTERED

## 2021-10-11 PROCEDURE — 99999 PR PBB SHADOW E&M-EST. PATIENT-LVL II: CPT | Mod: PBBFAC,,, | Performed by: DIETITIAN, REGISTERED

## 2021-10-18 ENCOUNTER — PATIENT MESSAGE (OUTPATIENT)
Dept: ADMINISTRATIVE | Facility: HOSPITAL | Age: 61
End: 2021-10-18
Payer: COMMERCIAL

## 2021-10-19 ENCOUNTER — TELEPHONE (OUTPATIENT)
Dept: DIABETES | Facility: CLINIC | Age: 61
End: 2021-10-19

## 2021-10-27 ENCOUNTER — PATIENT MESSAGE (OUTPATIENT)
Dept: INTERNAL MEDICINE | Facility: CLINIC | Age: 61
End: 2021-10-27
Payer: COMMERCIAL

## 2021-12-06 ENCOUNTER — PATIENT OUTREACH (OUTPATIENT)
Dept: ADMINISTRATIVE | Facility: HOSPITAL | Age: 61
End: 2021-12-06
Payer: COMMERCIAL

## 2021-12-08 ENCOUNTER — OFFICE VISIT (OUTPATIENT)
Dept: INTERNAL MEDICINE | Facility: CLINIC | Age: 61
End: 2021-12-08
Payer: COMMERCIAL

## 2021-12-08 ENCOUNTER — LAB VISIT (OUTPATIENT)
Dept: LAB | Facility: HOSPITAL | Age: 61
End: 2021-12-08
Attending: PHYSICIAN ASSISTANT
Payer: COMMERCIAL

## 2021-12-08 VITALS
HEART RATE: 94 BPM | OXYGEN SATURATION: 95 % | BODY MASS INDEX: 28.71 KG/M2 | HEIGHT: 69 IN | SYSTOLIC BLOOD PRESSURE: 106 MMHG | WEIGHT: 193.81 LBS | DIASTOLIC BLOOD PRESSURE: 72 MMHG | TEMPERATURE: 99 F

## 2021-12-08 DIAGNOSIS — E11.22 TYPE 2 DIABETES MELLITUS WITH STAGE 3B CHRONIC KIDNEY DISEASE, WITHOUT LONG-TERM CURRENT USE OF INSULIN: Chronic | ICD-10-CM

## 2021-12-08 DIAGNOSIS — J06.9 VIRAL URI: Primary | ICD-10-CM

## 2021-12-08 DIAGNOSIS — N18.32 TYPE 2 DIABETES MELLITUS WITH STAGE 3B CHRONIC KIDNEY DISEASE, WITHOUT LONG-TERM CURRENT USE OF INSULIN: Chronic | ICD-10-CM

## 2021-12-08 LAB
ESTIMATED AVG GLUCOSE: 295 MG/DL (ref 68–131)
HBA1C MFR BLD: 11.9 % (ref 4–5.6)

## 2021-12-08 PROCEDURE — 3066F NEPHROPATHY DOC TX: CPT | Mod: CPTII,S$GLB,, | Performed by: PHYSICIAN ASSISTANT

## 2021-12-08 PROCEDURE — 99999 PR PBB SHADOW E&M-EST. PATIENT-LVL IV: CPT | Mod: PBBFAC,,, | Performed by: PHYSICIAN ASSISTANT

## 2021-12-08 PROCEDURE — 99999 PR PBB SHADOW E&M-EST. PATIENT-LVL IV: ICD-10-PCS | Mod: PBBFAC,,, | Performed by: PHYSICIAN ASSISTANT

## 2021-12-08 PROCEDURE — 3061F NEG MICROALBUMINURIA REV: CPT | Mod: CPTII,S$GLB,, | Performed by: PHYSICIAN ASSISTANT

## 2021-12-08 PROCEDURE — 99213 OFFICE O/P EST LOW 20 MIN: CPT | Mod: S$GLB,,, | Performed by: PHYSICIAN ASSISTANT

## 2021-12-08 PROCEDURE — 3066F PR DOCUMENTATION OF TREATMENT FOR NEPHROPATHY: ICD-10-PCS | Mod: CPTII,S$GLB,, | Performed by: PHYSICIAN ASSISTANT

## 2021-12-08 PROCEDURE — 99213 PR OFFICE/OUTPT VISIT, EST, LEVL III, 20-29 MIN: ICD-10-PCS | Mod: S$GLB,,, | Performed by: PHYSICIAN ASSISTANT

## 2021-12-08 PROCEDURE — 36415 COLL VENOUS BLD VENIPUNCTURE: CPT | Performed by: PHYSICIAN ASSISTANT

## 2021-12-08 PROCEDURE — 3061F PR NEG MICROALBUMINURIA RESULT DOCUMENTED/REVIEW: ICD-10-PCS | Mod: CPTII,S$GLB,, | Performed by: PHYSICIAN ASSISTANT

## 2021-12-08 PROCEDURE — 83036 HEMOGLOBIN GLYCOSYLATED A1C: CPT | Performed by: PHYSICIAN ASSISTANT

## 2021-12-16 ENCOUNTER — PATIENT MESSAGE (OUTPATIENT)
Dept: INTERNAL MEDICINE | Facility: CLINIC | Age: 61
End: 2021-12-16
Payer: COMMERCIAL

## 2022-01-07 ENCOUNTER — OFFICE VISIT (OUTPATIENT)
Dept: INTERNAL MEDICINE | Facility: CLINIC | Age: 62
End: 2022-01-07
Payer: COMMERCIAL

## 2022-01-07 ENCOUNTER — TELEPHONE (OUTPATIENT)
Dept: DIABETES | Facility: CLINIC | Age: 62
End: 2022-01-07
Payer: COMMERCIAL

## 2022-01-07 VITALS
OXYGEN SATURATION: 98 % | TEMPERATURE: 98 F | WEIGHT: 197.75 LBS | SYSTOLIC BLOOD PRESSURE: 120 MMHG | BODY MASS INDEX: 29.29 KG/M2 | DIASTOLIC BLOOD PRESSURE: 78 MMHG | HEIGHT: 69 IN | HEART RATE: 97 BPM

## 2022-01-07 DIAGNOSIS — F33.0 MILD EPISODE OF RECURRENT MAJOR DEPRESSIVE DISORDER: Chronic | ICD-10-CM

## 2022-01-07 DIAGNOSIS — E78.5 DYSLIPIDEMIA ASSOCIATED WITH TYPE 2 DIABETES MELLITUS: Chronic | ICD-10-CM

## 2022-01-07 DIAGNOSIS — N18.32 TYPE 2 DIABETES MELLITUS WITH STAGE 3B CHRONIC KIDNEY DISEASE, WITHOUT LONG-TERM CURRENT USE OF INSULIN: Primary | Chronic | ICD-10-CM

## 2022-01-07 DIAGNOSIS — I10 BENIGN ESSENTIAL HYPERTENSION: Chronic | ICD-10-CM

## 2022-01-07 DIAGNOSIS — E11.69 DYSLIPIDEMIA ASSOCIATED WITH TYPE 2 DIABETES MELLITUS: Chronic | ICD-10-CM

## 2022-01-07 DIAGNOSIS — Z91.148 NONCOMPLIANCE WITH MEDICATIONS: ICD-10-CM

## 2022-01-07 DIAGNOSIS — F41.1 GENERALIZED ANXIETY DISORDER: Chronic | ICD-10-CM

## 2022-01-07 DIAGNOSIS — E11.22 TYPE 2 DIABETES MELLITUS WITH STAGE 3B CHRONIC KIDNEY DISEASE, WITHOUT LONG-TERM CURRENT USE OF INSULIN: Primary | Chronic | ICD-10-CM

## 2022-01-07 PROCEDURE — 3074F PR MOST RECENT SYSTOLIC BLOOD PRESSURE < 130 MM HG: ICD-10-PCS | Mod: CPTII,S$GLB,, | Performed by: PHYSICIAN ASSISTANT

## 2022-01-07 PROCEDURE — 3074F SYST BP LT 130 MM HG: CPT | Mod: CPTII,S$GLB,, | Performed by: PHYSICIAN ASSISTANT

## 2022-01-07 PROCEDURE — 1159F PR MEDICATION LIST DOCUMENTED IN MEDICAL RECORD: ICD-10-PCS | Mod: CPTII,S$GLB,, | Performed by: PHYSICIAN ASSISTANT

## 2022-01-07 PROCEDURE — 3078F DIAST BP <80 MM HG: CPT | Mod: CPTII,S$GLB,, | Performed by: PHYSICIAN ASSISTANT

## 2022-01-07 PROCEDURE — 1159F MED LIST DOCD IN RCRD: CPT | Mod: CPTII,S$GLB,, | Performed by: PHYSICIAN ASSISTANT

## 2022-01-07 PROCEDURE — 99213 OFFICE O/P EST LOW 20 MIN: CPT | Mod: S$GLB,,, | Performed by: PHYSICIAN ASSISTANT

## 2022-01-07 PROCEDURE — 99213 PR OFFICE/OUTPT VISIT, EST, LEVL III, 20-29 MIN: ICD-10-PCS | Mod: S$GLB,,, | Performed by: PHYSICIAN ASSISTANT

## 2022-01-07 PROCEDURE — 1160F RVW MEDS BY RX/DR IN RCRD: CPT | Mod: CPTII,S$GLB,, | Performed by: PHYSICIAN ASSISTANT

## 2022-01-07 PROCEDURE — 1160F PR REVIEW ALL MEDS BY PRESCRIBER/CLIN PHARMACIST DOCUMENTED: ICD-10-PCS | Mod: CPTII,S$GLB,, | Performed by: PHYSICIAN ASSISTANT

## 2022-01-07 PROCEDURE — 99999 PR PBB SHADOW E&M-EST. PATIENT-LVL V: CPT | Mod: PBBFAC,,, | Performed by: PHYSICIAN ASSISTANT

## 2022-01-07 PROCEDURE — 99999 PR PBB SHADOW E&M-EST. PATIENT-LVL V: ICD-10-PCS | Mod: PBBFAC,,, | Performed by: PHYSICIAN ASSISTANT

## 2022-01-07 PROCEDURE — 3008F BODY MASS INDEX DOCD: CPT | Mod: CPTII,S$GLB,, | Performed by: PHYSICIAN ASSISTANT

## 2022-01-07 PROCEDURE — 3078F PR MOST RECENT DIASTOLIC BLOOD PRESSURE < 80 MM HG: ICD-10-PCS | Mod: CPTII,S$GLB,, | Performed by: PHYSICIAN ASSISTANT

## 2022-01-07 PROCEDURE — 3008F PR BODY MASS INDEX (BMI) DOCUMENTED: ICD-10-PCS | Mod: CPTII,S$GLB,, | Performed by: PHYSICIAN ASSISTANT

## 2022-01-07 RX ORDER — DULAGLUTIDE 3 MG/.5ML
3 INJECTION, SOLUTION SUBCUTANEOUS
Qty: 12 PEN | Refills: 3 | Status: SHIPPED | OUTPATIENT
Start: 2022-01-07 | End: 2022-10-04 | Stop reason: DRUGHIGH

## 2022-01-07 NOTE — PROGRESS NOTES
Subjective:      Patient ID: Shelton Young is a 61 y.o. male.    Chief Complaint: Diabetes    Diabetes  He presents for his follow-up diabetic visit. He has type 2 diabetes mellitus. His disease course has been worsening. There are no hypoglycemic associated symptoms. Pertinent negatives for hypoglycemia include no confusion, dizziness, headaches, nervousness/anxiousness, pallor or tremors. Associated symptoms include weight loss. Pertinent negatives for diabetes include no blurred vision, no chest pain, no fatigue, no foot paresthesias, no foot ulcerations, no polydipsia, no polyphagia, no polyuria, no visual change and no weakness. There are no hypoglycemic complications. Diabetic complications include nephropathy. Risk factors for coronary artery disease include stress, male sex, dyslipidemia and diabetes mellitus. Current diabetic treatments: trulicity, jardiance. He is compliant with treatment some of the time. His weight is decreasing steadily. When asked about meal planning, he reported none. He has not had a previous visit with a dietitian. He participates in exercise weekly. He does not see a podiatrist.Eye exam is current.      Lost about 10 lbs in the past 3 months. Admits that he has not been compliant with the medications.   Lots of stress around the holidays. Missed rx doses, christine around the time he had his last a1c drawn. He reports that he is back on track and has been walking more. Back pain is improving since his epidural injection.    Goes somewhere else for eye exams. [facility]. [facility]   Admits that he has not been taking his sertraline. Thought he could do without it. No issues or SE with the medication. Scheduled for follow up with psych in a couple weeks.   Has not yet been able to see diabetes. Missed apts.     Patient Active Problem List   Diagnosis    Type 2 diabetes mellitus with other specified complication    Idiopathic chronic gout of multiple sites  with tophus    Benign essential hypertension    Dyslipidemia associated with type 2 diabetes mellitus    Hyperbilirubinemia    Gastroesophageal reflux disease without esophagitis    Generalized anxiety disorder    Non-alcoholic fatty liver disease    Seasonal allergic rhinitis due to pollen    Family history of prostate cancer    Type 2 diabetes mellitus with stage 3 chronic kidney disease, without long-term current use of insulin    Mild episode of recurrent major depressive disorder    Mood swings    Balanitis    Noncompliance with medications       Current Outpatient Medications:     amLODIPine (NORVASC) 10 MG tablet, Take 1 tablet (10 mg total) by mouth once daily., Disp: 90 tablet, Rfl: 4    ASCORBATE CALCIUM (VITAMIN C ORAL), Take by mouth every other day., Disp: , Rfl:     blood sugar diagnostic Strp, Check blood glucose 1 time daily as directed and as needed (dispense insurance preferred brand or patient choice), Disp: 200 each, Rfl: 11    blood-glucose meter kit, Check blood glucose 1 time daily as directed and as needed (dispense insurance preferred brand or patient choice), Disp: 1 each, Rfl: 0    colchicine (MITIGARE) 0.6 mg Cap, Take 2 capsules at onset of gout attack. May take 1 more capsule 2 hours later if needed. MAX 3 CAPSULES PER DAY. MAX 12 CAPSULES PER WEEK., Disp: 90 capsule, Rfl: 1    doxepin (SINEQUAN) 10 MG capsule, Take 1 to 2 capsules at bedtime as needed for sleep., Disp: 60 capsule, Rfl: 2    empagliflozin (JARDIANCE) 10 mg tablet, Take 1 tablet (10 mg total) by mouth once daily., Disp: 90 tablet, Rfl: 1    indomethacin (INDOCIN) 25 MG capsule, Take 1 capsule (25 mg total) by mouth 3 (three) times daily as needed (ACUTE GOUT)., Disp: 60 capsule, Rfl: 0    LACTOBAC NO.41/BIFIDOBACT NO.7 (PROBIOTIC-10 ORAL), Take by mouth., Disp: , Rfl:     lancets Misc, Check blood glucose 1 time daily as directed and as needed (dispense insurance preferred brand or patient choice),  Disp: 200 each, Rfl: 11    losartan-hydrochlorothiazide 100-25 mg (HYZAAR) 100-25 mg per tablet, Take 1 tablet by mouth once daily., Disp: 90 tablet, Rfl: 14    rosuvastatin (CRESTOR) 40 MG Tab, Take 1 tablet (40 mg total) by mouth every evening. (FOR CHOLESTEROL AND HEART HEALTH), Disp: 90 tablet, Rfl: 3    sertraline (ZOLOFT) 100 MG tablet, Take 2 by mouth daily, Disp: 60 tablet, Rfl: 2    varicella-zoster gE-AS01B, PF, (SHINGRIX) 50 mcg/0.5 mL injection, Inject 0.5 mL (one dose) into muscle now; give second dose at least 2 months later, Disp: 0.5 mL, Rfl: 1    dulaglutide (TRULICITY) 3 mg/0.5 mL pen injector, Inject 3 mg into the skin every 7 days., Disp: 12 pen, Rfl: 3    miconazole (MICOTIN) 2 % cream, Apply topically 2 (two) times daily. for 10 days, Disp: 14 g, Rfl: 0    pantoprazole (PROTONIX) 40 MG tablet, Take 1 tablet (40 mg total) by mouth once daily., Disp: 90 tablet, Rfl: 3    valACYclovir (VALTREX) 500 MG tablet, Take 1 tablet (500 mg total) by mouth 2 (two) times daily. for 3 days, Disp: 6 tablet, Rfl: 3    Review of Systems   Constitutional: Positive for weight loss. Negative for activity change, appetite change, chills, diaphoresis, fatigue, fever and unexpected weight change.   HENT: Negative.  Negative for congestion, hearing loss, postnasal drip, rhinorrhea, sore throat, trouble swallowing and voice change.    Eyes: Negative.  Negative for blurred vision and visual disturbance.   Respiratory: Negative.  Negative for cough, choking, chest tightness and shortness of breath.    Cardiovascular: Negative for chest pain, palpitations and leg swelling.   Gastrointestinal: Negative for abdominal distention, abdominal pain, blood in stool, constipation, diarrhea, nausea and vomiting.   Endocrine: Negative for cold intolerance, heat intolerance, polydipsia, polyphagia and polyuria.   Genitourinary: Negative.  Negative for difficulty urinating and frequency.   Musculoskeletal: Negative for  "arthralgias, back pain, gait problem, joint swelling and myalgias.   Skin: Negative for color change, pallor, rash and wound.   Neurological: Negative for dizziness, tremors, weakness, light-headedness, numbness and headaches.   Hematological: Negative for adenopathy.   Psychiatric/Behavioral: Positive for dysphoric mood. Negative for agitation, behavioral problems, confusion, decreased concentration, hallucinations, self-injury, sleep disturbance and suicidal ideas. The patient is not nervous/anxious and is not hyperactive.      Objective:   /78 (BP Location: Right arm, Patient Position: Sitting, BP Method: Large (Manual))   Pulse 97   Temp 98 °F (36.7 °C) (Tympanic)   Ht 5' 9" (1.753 m)   Wt 89.7 kg (197 lb 12 oz)   SpO2 98%   BMI 29.20 kg/m²     Physical Exam  Vitals and nursing note reviewed.   Constitutional:       General: He is active. He is not in acute distress.Vital signs are normal.      Appearance: Normal appearance. He is well-developed and well-nourished. He is not ill-appearing, toxic-appearing or diaphoretic.   HENT:      Head: Normocephalic and atraumatic.   Cardiovascular:      Rate and Rhythm: Normal rate and regular rhythm.      Heart sounds: Normal heart sounds. No murmur heard.  No friction rub. No gallop.    Pulmonary:      Effort: Pulmonary effort is normal. No respiratory distress.      Breath sounds: Normal breath sounds. No wheezing or rales.   Skin:     General: Skin is warm.   Neurological:      Mental Status: He is alert and oriented to person, place, and time.   Psychiatric:         Mood and Affect: Mood and affect normal.         Behavior: Behavior normal.         Thought Content: Thought content normal.         Judgment: Judgment normal.         Assessment:     1. Type 2 diabetes mellitus with stage 3b chronic kidney disease, without long-term current use of insulin    2. Benign essential hypertension    3. Dyslipidemia associated with type 2 diabetes mellitus    4. " Generalized anxiety disorder    5. Mild episode of recurrent major depressive disorder    6. Noncompliance with medications      Plan:   Type 2 diabetes mellitus with stage 3b chronic kidney disease, without long-term current use of insulin  -     dulaglutide (TRULICITY) 3 mg/0.5 mL pen injector; Inject 3 mg into the skin every 7 days.  Dispense: 12 pen; Refill: 3  -     Ambulatory referral/consult to Diabetic Advanced Practice Providers (Medical Management); Future; Expected date: 01/14/2022  -     Ambulatory referral/consult to Diabetes Education; Future; Expected date: 01/14/2022  -increase trulicity from 1.5 to 3.     Benign essential hypertension    Dyslipidemia associated with type 2 diabetes mellitus    Generalized anxiety disorder    Mild episode of recurrent major depressive disorder    Noncompliance with medications    -bp stable and controlled on current meds  -advised to get back on sertraline. Christine for upcoming court dates. Follow up as scheduled with psych.   -advised to take meds as prescribed christine diabetes and cholesterol meds. Risks associated with noncompliance discussed in detail. (stroke, heart attack, kidney failure).       Follow up in about 4 weeks (around 2/4/2022), or if symptoms worsen or fail to improve.

## 2022-01-27 ENCOUNTER — OFFICE VISIT (OUTPATIENT)
Dept: PSYCHIATRY | Facility: CLINIC | Age: 62
End: 2022-01-27
Payer: COMMERCIAL

## 2022-01-27 DIAGNOSIS — G47.00 INSOMNIA, UNSPECIFIED TYPE: ICD-10-CM

## 2022-01-27 DIAGNOSIS — F41.9 ANXIETY: Primary | ICD-10-CM

## 2022-01-27 PROCEDURE — 99213 OFFICE O/P EST LOW 20 MIN: CPT | Mod: S$GLB,,, | Performed by: PSYCHIATRY & NEUROLOGY

## 2022-01-27 PROCEDURE — 99999 PR PBB SHADOW E&M-EST. PATIENT-LVL I: CPT | Mod: PBBFAC,,, | Performed by: PSYCHIATRY & NEUROLOGY

## 2022-01-27 PROCEDURE — 99213 PR OFFICE/OUTPT VISIT, EST, LEVL III, 20-29 MIN: ICD-10-PCS | Mod: S$GLB,,, | Performed by: PSYCHIATRY & NEUROLOGY

## 2022-01-27 PROCEDURE — 99999 PR PBB SHADOW E&M-EST. PATIENT-LVL I: ICD-10-PCS | Mod: PBBFAC,,, | Performed by: PSYCHIATRY & NEUROLOGY

## 2022-01-27 RX ORDER — DOXEPIN HYDROCHLORIDE 10 MG/1
CAPSULE ORAL
Qty: 60 CAPSULE | Refills: 2 | Status: SHIPPED | OUTPATIENT
Start: 2022-01-27 | End: 2022-06-02 | Stop reason: SDUPTHER

## 2022-01-27 RX ORDER — SERTRALINE HYDROCHLORIDE 100 MG/1
TABLET, FILM COATED ORAL
Qty: 60 TABLET | Refills: 2 | Status: SHIPPED | OUTPATIENT
Start: 2022-01-27 | End: 2022-06-02 | Stop reason: SDUPTHER

## 2022-01-27 NOTE — PROGRESS NOTES
"Outpatient Psychiatry Follow-up Visit (MD/NP)    1/27/2022    Shelton Young, a 61 y.o. male, presenting for follow-up visit. Met with patient.    Reason for Encounter: Patient complains of hx of low moods.     Interval: Patient seen and interviewed for follow-up, last seen about 4 months ago. Divorce finalized. Started a new relationship. Improving sleep  Kids have new therapist. Doing a little better. Health improvements with more regular exercise. Diabetes numbers are improving. Increased trulicity, taking Jardiance. No new illnesses. Adherence has been good. Experiencing some sexual side effects.     Background: Pt is a 59 y/o M who presents for establishment of care, endorses low moods, poor sleep, feeling stressed in context of relationship and parenting stressors. Started on sertraline by PCP (increases energy, may have worsened sleep), also prescribed xanax which he hasn't taken.     , estranged from wife - he describes their relationship as her having been verbally abusive, denigrating him "to try and control me", "but after a while I started to believe her". Wife was arrested for child endangerment in January, report submitted by step-son. Had video from surveillance camera going in the house. Has custody of his kids. Has had help from a  for childcare. Was briefly staying with friends after moving out.     Wife out on bail, pending trial.  from wife in August '19 after 10 years of marriage. 3 kids - 10, 8, 5. 2 years ago, lost my sister. "haven't mourned that".     Psych Hx: No previous assessments  one previous long periods of sadness or losses of interest     Past Medical History:   Diagnosis Date    Acute gout of left ankle 9/24/2018    Anxiety with depression 11/30/2018    Benign essential hypertension 11/8/2018    Borderline systolic HTN     Diabetes mellitus, type 2 Diagnosed 2015    Gastroesophageal reflux disease without esophagitis 11/16/2018    Non-alcoholic fatty " "liver disease 2019    Prediabetes     Type 2 diabetes mellitus with hyperglycemia, without long-term current use of insulin 2019    Type 2 diabetes mellitus with stage 3 chronic kidney disease, without long-term current use of insulin 2019    Type 2 diabetes mellitus without complication, without long-term current use of insulin 2017   Family Hx: uncle - PTSD from WWII.     Medical Hx:   Past Medical History:   Diagnosis Date    Acute gout of left ankle 2018    Anxiety with depression 2018    Benign essential hypertension 2018    Borderline systolic HTN     Diabetes mellitus, type 2 Diagnosed     Gastroesophageal reflux disease without esophagitis 2018    Non-alcoholic fatty liver disease 2019    Prediabetes     Type 2 diabetes mellitus with hyperglycemia, without long-term current use of insulin 2019    Type 2 diabetes mellitus with stage 3 chronic kidney disease, without long-term current use of insulin 2019    Type 2 diabetes mellitus without complication, without long-term current use of insulin 2017     Social Hx: born & raised in . Grew up with both parents in the household. Father was a . 2 brothers, 3 sisters. Youngest brother  when he was 15, youngest sister  when she was 50. Denies maltreatment growing up. Childhood was happy, liked school. Graduated HS. Played sports. Did 120 credits at [employer], but didn't graduate. 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     Kids - going to school (hybrid distance/in-classroom). Tries to facilitate their learning when they're at home. They go to [employer].     , estranged from wife - she was verbally abusive, denigrating him "to try and control me", "but after a while I started to believe her". Wife was arrested for child endangerment in January, report submitted by step-son. Had video from surveillance camera going in the house. Has " "custody of his kids. Has had help from a  for childcare. Was briefly staying with friends after     Wife out on bail, pending trial.  from wife in August '19 after 10 years of marriage. 3 kids - 10, 8, 5. 2 years ago, lost my sister. "haven't mourned that".      x 2. First marriage was 10 years, ended due to his infidelity. Has 2 kids from that marriage. They live in [place], [place]. He's estranged from his daughter, but has a relationship with his son.     retired in August - living off severance from [employer] after working there for 13 years. Previously was a ,  x 20 years.     Review Of Systems:     GENERAL:  No weight gain or loss  SKIN:  No rashes or lacerations  HEAD:  No headaches  CHEST:  No shortness of breath, hyperventilation or cough  CARDIOVASCULAR:  No tachycardia or chest pain  ABDOMEN:  No nausea, vomiting, pain, constipation or diarrhea  URINARY:  No frequency, dysuria or sexual dysfunction  ENDOCRINE:  No polydipsia, polyuria  MUSCULOSKELETAL:  No pain or stiffness of the joints  NEUROLOGIC:  No weakness, sensory changes, seizures, confusion, memory loss, tremor or other abnormal movements    Current Evaluation:     Nutritional Screening: Considering the patient's height and weight, medications, medical history and preferences, should a referral be made to the dietitian? no    Constitutional  Vitals:  Most recent vital signs, dated less than 90 days prior to this appointment, were not reviewed.       General:  unremarkable, age appropriate     Musculoskeletal  Muscle Strength/Tone:  no tremor, no tic   Gait & Station:  non-ataxic     Psychiatric  Appearance: casually dressed & groomed;   Behavior: calm,   Cooperation: cooperative with assessment  Speech: normal rate, volume, tone  Thought Process: linear, goal-directed  Thought Content: No suicidal or homicidal ideation; no delusions  Affect: normal range  Mood: euthymic  Perceptions: No auditory or visual " hallucinations  Level of Consciousness: alert throughout interview  Insight: fair  Cognition: Oriented to person, place, time, & situation  Memory: no apparent deficits to general clinical interview; not formally assessed  Attention/Concentration: no apparent deficits to general clinical interview; not formally assessed  Fund of Knowledge: average by vocabulary/education    Laboratory Data  No visits with results within 1 Month(s) from this visit.   Latest known visit with results is:   Lab Visit on 12/08/2021   Component Date Value Ref Range Status    Hemoglobin A1C 12/08/2021 11.9* 4.0 - 5.6 % Final    Estimated Avg Glucose 12/08/2021 295* 68 - 131 mg/dL Final     Medications  Outpatient Encounter Medications as of 1/27/2022   Medication Sig Dispense Refill    amLODIPine (NORVASC) 10 MG tablet Take 1 tablet (10 mg total) by mouth once daily. 90 tablet 4    ASCORBATE CALCIUM (VITAMIN C ORAL) Take by mouth every other day.      blood sugar diagnostic Strp Check blood glucose 1 time daily as directed and as needed (dispense insurance preferred brand or patient choice) 200 each 11    blood-glucose meter kit Check blood glucose 1 time daily as directed and as needed (dispense insurance preferred brand or patient choice) 1 each 0    colchicine (MITIGARE) 0.6 mg Cap Take 2 capsules at onset of gout attack. May take 1 more capsule 2 hours later if needed. MAX 3 CAPSULES PER DAY. MAX 12 CAPSULES PER WEEK. 90 capsule 1    doxepin (SINEQUAN) 10 MG capsule Take 1 to 2 capsules at bedtime as needed for sleep. 60 capsule 2    dulaglutide (TRULICITY) 3 mg/0.5 mL pen injector Inject 3 mg into the skin every 7 days. 12 pen 3    empagliflozin (JARDIANCE) 10 mg tablet Take 1 tablet (10 mg total) by mouth once daily. 90 tablet 1    indomethacin (INDOCIN) 25 MG capsule Take 1 capsule (25 mg total) by mouth 3 (three) times daily as needed (ACUTE GOUT). 60 capsule 0    LACTOBAC NO.41/BIFIDOBACT NO.7 (PROBIOTIC-10 ORAL) Take  by mouth.      lancets Misc Check blood glucose 1 time daily as directed and as needed (dispense insurance preferred brand or patient choice) 200 each 11    losartan-hydrochlorothiazide 100-25 mg (HYZAAR) 100-25 mg per tablet Take 1 tablet by mouth once daily. 90 tablet 14    miconazole (MICOTIN) 2 % cream Apply topically 2 (two) times daily. for 10 days 14 g 0    pantoprazole (PROTONIX) 40 MG tablet Take 1 tablet (40 mg total) by mouth once daily. 90 tablet 3    rosuvastatin (CRESTOR) 40 MG Tab Take 1 tablet (40 mg total) by mouth every evening. (FOR CHOLESTEROL AND HEART HEALTH) 90 tablet 3    sertraline (ZOLOFT) 100 MG tablet Take 2 by mouth daily 60 tablet 2    valACYclovir (VALTREX) 500 MG tablet Take 1 tablet (500 mg total) by mouth 2 (two) times daily. for 3 days 6 tablet 3    varicella-zoster gE-AS01B, PF, (SHINGRIX) 50 mcg/0.5 mL injection Inject 0.5 mL (one dose) into muscle now; give second dose at least 2 months later 0.5 mL 1     No facility-administered encounter medications on file as of 1/27/2022.     Assessment - Diagnosis - Goals:     Impression: 60 y/o male with generally good baseline mental health with worse anxiety, in context of life stressors (see above). Modest improvement with sertraline increases. sleep improved with doxepin. Tolerates treatment ok.     Treatment Goals:  Specify outcomes written in observable, behavioral terms: reduce anxiety.     Treatment Plan/Recommendations:   · Sertraline 200 mg daily.   · doxepin 10 - 20 mg qhs for sleep.  · Encouraged psychotherapy, how to access.   · Discussed risks, benefits, and alternatives to treatment plan documented above with patient. I answered all patient questions related to this plan and patient expressed understanding and agreement.     Return to Clinic: 2 months    MOISES Painting MD  Psychiatry  [facility]  [address] , [place] [address] [phone]

## 2022-02-01 ENCOUNTER — PATIENT MESSAGE (OUTPATIENT)
Dept: ADMINISTRATIVE | Facility: OTHER | Age: 62
End: 2022-02-01
Payer: COMMERCIAL

## 2022-02-01 ENCOUNTER — OFFICE VISIT (OUTPATIENT)
Dept: INTERNAL MEDICINE | Facility: CLINIC | Age: 62
End: 2022-02-01
Payer: COMMERCIAL

## 2022-02-01 ENCOUNTER — PATIENT MESSAGE (OUTPATIENT)
Dept: INTERNAL MEDICINE | Facility: CLINIC | Age: 62
End: 2022-02-01

## 2022-02-01 VITALS
DIASTOLIC BLOOD PRESSURE: 70 MMHG | OXYGEN SATURATION: 97 % | TEMPERATURE: 98 F | WEIGHT: 194 LBS | BODY MASS INDEX: 28.73 KG/M2 | HEIGHT: 69 IN | HEART RATE: 67 BPM | SYSTOLIC BLOOD PRESSURE: 120 MMHG

## 2022-02-01 DIAGNOSIS — E11.69 DYSLIPIDEMIA ASSOCIATED WITH TYPE 2 DIABETES MELLITUS: Chronic | ICD-10-CM

## 2022-02-01 DIAGNOSIS — Z20.822 SUSPECTED COVID-19 VIRUS INFECTION: ICD-10-CM

## 2022-02-01 DIAGNOSIS — K76.0 NON-ALCOHOLIC FATTY LIVER DISEASE: Chronic | ICD-10-CM

## 2022-02-01 DIAGNOSIS — Z00.00 PREVENTATIVE HEALTH CARE: Primary | ICD-10-CM

## 2022-02-01 DIAGNOSIS — N18.32 TYPE 2 DIABETES MELLITUS WITH STAGE 3B CHRONIC KIDNEY DISEASE, WITHOUT LONG-TERM CURRENT USE OF INSULIN: ICD-10-CM

## 2022-02-01 DIAGNOSIS — Z28.21 IMMUNIZATION NOT CARRIED OUT BECAUSE OF PATIENT REFUSAL: ICD-10-CM

## 2022-02-01 DIAGNOSIS — J00 CORYZA: ICD-10-CM

## 2022-02-01 DIAGNOSIS — E11.65 TYPE 2 DIABETES MELLITUS WITH HYPERGLYCEMIA, WITHOUT LONG-TERM CURRENT USE OF INSULIN: ICD-10-CM

## 2022-02-01 DIAGNOSIS — E11.22 TYPE 2 DIABETES MELLITUS WITH STAGE 3B CHRONIC KIDNEY DISEASE, WITHOUT LONG-TERM CURRENT USE OF INSULIN: ICD-10-CM

## 2022-02-01 DIAGNOSIS — E78.5 DYSLIPIDEMIA ASSOCIATED WITH TYPE 2 DIABETES MELLITUS: Chronic | ICD-10-CM

## 2022-02-01 DIAGNOSIS — M1A.09X1 IDIOPATHIC CHRONIC GOUT OF MULTIPLE SITES WITH TOPHUS: Chronic | ICD-10-CM

## 2022-02-01 DIAGNOSIS — I10 BENIGN ESSENTIAL HYPERTENSION: Chronic | ICD-10-CM

## 2022-02-01 DIAGNOSIS — Z23 NEED FOR COVID-19 VACCINE: ICD-10-CM

## 2022-02-01 PROBLEM — N18.31 TYPE 2 DIABETES MELLITUS WITH STAGE 3A CHRONIC KIDNEY DISEASE, WITHOUT LONG-TERM CURRENT USE OF INSULIN: Status: ACTIVE | Noted: 2019-08-08

## 2022-02-01 LAB
CTP QC/QA: YES
SARS-COV-2 RDRP RESP QL NAA+PROBE: NEGATIVE

## 2022-02-01 PROCEDURE — 99396 PR PREVENTIVE VISIT,EST,40-64: ICD-10-PCS | Mod: S$GLB,,, | Performed by: FAMILY MEDICINE

## 2022-02-01 PROCEDURE — 3074F PR MOST RECENT SYSTOLIC BLOOD PRESSURE < 130 MM HG: ICD-10-PCS | Mod: CPTII,S$GLB,, | Performed by: FAMILY MEDICINE

## 2022-02-01 PROCEDURE — 1159F MED LIST DOCD IN RCRD: CPT | Mod: CPTII,S$GLB,, | Performed by: FAMILY MEDICINE

## 2022-02-01 PROCEDURE — 99999 PR PBB SHADOW E&M-EST. PATIENT-LVL V: ICD-10-PCS | Mod: PBBFAC,,, | Performed by: FAMILY MEDICINE

## 2022-02-01 PROCEDURE — 1160F RVW MEDS BY RX/DR IN RCRD: CPT | Mod: CPTII,S$GLB,, | Performed by: FAMILY MEDICINE

## 2022-02-01 PROCEDURE — 3078F DIAST BP <80 MM HG: CPT | Mod: CPTII,S$GLB,, | Performed by: FAMILY MEDICINE

## 2022-02-01 PROCEDURE — 3078F PR MOST RECENT DIASTOLIC BLOOD PRESSURE < 80 MM HG: ICD-10-PCS | Mod: CPTII,S$GLB,, | Performed by: FAMILY MEDICINE

## 2022-02-01 PROCEDURE — U0002: ICD-10-PCS | Mod: QW,S$GLB,, | Performed by: FAMILY MEDICINE

## 2022-02-01 PROCEDURE — U0002 COVID-19 LAB TEST NON-CDC: HCPCS | Mod: QW,S$GLB,, | Performed by: FAMILY MEDICINE

## 2022-02-01 PROCEDURE — 1160F PR REVIEW ALL MEDS BY PRESCRIBER/CLIN PHARMACIST DOCUMENTED: ICD-10-PCS | Mod: CPTII,S$GLB,, | Performed by: FAMILY MEDICINE

## 2022-02-01 PROCEDURE — 1159F PR MEDICATION LIST DOCUMENTED IN MEDICAL RECORD: ICD-10-PCS | Mod: CPTII,S$GLB,, | Performed by: FAMILY MEDICINE

## 2022-02-01 PROCEDURE — 3008F BODY MASS INDEX DOCD: CPT | Mod: CPTII,S$GLB,, | Performed by: FAMILY MEDICINE

## 2022-02-01 PROCEDURE — 3008F PR BODY MASS INDEX (BMI) DOCUMENTED: ICD-10-PCS | Mod: CPTII,S$GLB,, | Performed by: FAMILY MEDICINE

## 2022-02-01 PROCEDURE — 99396 PREV VISIT EST AGE 40-64: CPT | Mod: S$GLB,,, | Performed by: FAMILY MEDICINE

## 2022-02-01 PROCEDURE — 3074F SYST BP LT 130 MM HG: CPT | Mod: CPTII,S$GLB,, | Performed by: FAMILY MEDICINE

## 2022-02-01 PROCEDURE — 99999 PR PBB SHADOW E&M-EST. PATIENT-LVL V: CPT | Mod: PBBFAC,,, | Performed by: FAMILY MEDICINE

## 2022-02-01 RX ORDER — EMPAGLIFLOZIN 10 MG/1
10 TABLET, FILM COATED ORAL DAILY
Qty: 30 TABLET | Refills: 1 | Status: SHIPPED | OUTPATIENT
Start: 2022-02-01 | End: 2022-02-09 | Stop reason: SDUPTHER

## 2022-02-01 NOTE — PATIENT INSTRUCTIONS
"Bring all your current prescriptions (pill bottles, creams, inhalers, etc.) to your next appointment so we can check to see that your current medication list is accurate.      I strongly recommend that you get your COVID-19 vaccine booster. I've already had mine.    The booster shot is necessary to be considered "fully vaccinated" against COVID-19. People who are fully vaccinated are much better protected from severe illness from COVID-19 than people who are unvaccinated or not fully vaccinated.    If your primary COVID-19 vaccine series was Pfizer, you should get a Pfizer booster.    You can learn more about the COVID-19 vaccine and booster shot options at [email or web address].    The quickest and easiest way to schedule an appointment for your COVID-19 booster vaccination is from the [employer] leo or your [facility] account online at [email or web address]. You can also schedule an appointment for your COVID-19 vaccination by calling [phone].    Please take care of yourself. You are important to me!   "

## 2022-02-01 NOTE — LETTER
ATTN: CARE COORDINATION   PATIENT IDENTIFYING INFORMATION:  [address]  [address]  [facility] [address] YOB: 1960  OUR MRN: [id number]   Home Phone [phone]   Work Phone Not on file.   Mobile [phone]        RECORDS REQUESTED FROM:   Logan Magallanes MD  [address] [facility]  [address]  [place] [address]  Via Fax: [phone]     AUTHORIZATION:  For the purpose of continuity of care, I, Jose Young, hereby authorize the hospital, physician, or entity named above to release my medical records for the dates of service specified above to: PERRI Galvan MD; [facility]; [address]; [place] [address]; PH: [phone]    REQUESTED METHOD OF DELIVERY: Fax ([phone]) or secure Email ([email or web address])  --------------------------------  SPECIFIC RECORDS REQUESTED:  diabetic eye exams    DATES OF SERVICE REQUESTED: 01/01/2021 through the present date  --------------------------------    In authorizing the release of the confidential information identified above, I hereby waive all restrictions or privileges imposed by law and release [facility] and Its affiliates and their staff from any restriction or privilege Imposed by law in connection with the disclosure or release of any professional record, observation or communication. I do understand that the information that is being released may be subject to re-disclosure by the recipient and may no longer be protected. I understand that my treatment, payment, enrollment or eligibility for benefits may not be conditioned on signing this authorization.  This authorization may be revoked in writing at any time, except to the extent that [facility] and its affiliates have already taken action in reliance on it. Letters to revoke this authorization should be addressed to [facility], Release of Information Department, [address] [place] [address].     If not previously revoked in writing, this authorization will terminate or  25 months after the date signed below.                   Signature of Patient    Date Signed                            NOTE: Fax now, and re-fax after  (he has appointment ).

## 2022-02-01 NOTE — ASSESSMENT & PLAN NOTE
Diabetes Management Status    Statin: Taking  ACE/ARB: Taking    Screening or Prevention Patient's value Goal Complete/Controlled?   HgA1C Testing and Control   Lab Results   Component Value Date    HGBA1C 11.9 (H) 12/08/2021      Annually/Less than 8% No   Lipid profile : 08/16/2021 Annually Yes   LDL control Lab Results   Component Value Date    LDLCALC 114.2 08/16/2021    Annually/Less than 100 mg/dl  No   Nephropathy screening Lab Results   Component Value Date    LABMICR <5.0 08/16/2021     No results found for: PROTEINUA Annually Yes   Blood pressure BP Readings from Last 1 Encounters:   02/01/22 120/70    Less than 140/90 Yes   Dilated retinal exam : 08/31/2020 Annually Yes   Foot exam   : 08/16/2021 Annually Yes     Lab Results   Component Value Date    HGBA1C 11.9 (H) 12/08/2021    HGBA1C 13.2 (H) 08/16/2021    HGBA1C 8.1 (H) 11/19/2020    ESTGFRAFRICA >60.0 08/16/2021    EGFRNONAA >60.0 08/16/2021    MICALBCREAT Unable to calculate 08/16/2021    LDLCALC 114.2 08/16/2021     Last 6 Patient Entered Readings                                          Most Recent A1c:     There is no flowsheet data to display.        HEALTH MAINTENANCE: Diabetic health maintenance interventions reviewed and are up to date except for:      Topic    Eye Exam

## 2022-02-01 NOTE — ASSESSMENT & PLAN NOTE
Lab Results   Component Value Date    AST 43 (H) 08/16/2021    AST 40 11/19/2020    AST 33 08/05/2020    ALT 45 (H) 08/16/2021    ALT 84 (H) 11/19/2020    ALT 55 (H) 08/05/2020

## 2022-02-01 NOTE — ASSESSMENT & PLAN NOTE
Lab Results   Component Value Date    CHOL 186 08/16/2021    CHOL 119 (L) 11/19/2020    TRIG 209 (H) 08/16/2021    TRIG 144 11/19/2020    HDL 30 (L) 08/16/2021    HDL 26 (L) 11/19/2020    LDLCALC 114.2 08/16/2021    LDLCALC 64.2 11/19/2020    NONHDLCHOL 156 08/16/2021    NONHDLCHOL 93 11/19/2020    AST 43 (H) 08/16/2021    ALT 45 (H) 08/16/2021     The 10-year ASCVD risk score (Gila BABCOCK Jr., et al., 2013) is: 20.6%    Values used to calculate the score:      Age: 61 years      Sex: Male      Is Non- : No      Diabetic: Yes      Tobacco smoker: No      Systolic Blood Pressure: 120 mmHg      Is BP treated: Yes      HDL Cholesterol: 30 mg/dL      Total Cholesterol: 186 mg/dL

## 2022-02-01 NOTE — ASSESSMENT & PLAN NOTE
BP Readings from Last 6 Encounters:   02/01/22 120/70   01/07/22 120/78   12/08/21 106/72   09/29/21 130/78   09/16/21 118/64   08/16/21 130/80

## 2022-02-01 NOTE — ASSESSMENT & PLAN NOTE
Lab Results   Component Value Date    URICACID 6.1 03/19/2020    URICACID 3.2 (L) 12/04/2018    URICACID 8.6 (H) 11/08/2018    ESTGFRAFRICA >60.0 08/16/2021    EGFRNONAA >60.0 08/16/2021    CREATININE 1.2 08/16/2021    BUN 13 08/16/2021

## 2022-02-01 NOTE — PROGRESS NOTES
WELLNESS VISIT (PREVENTIVE SERVICES)  2/1/22  1:30 PM CST  LAST ENCOUNTER WITH ME:  8/26/2020   HEALTH MAINTENANCE INTERVENTIONS - UP TO DATE  Health Maintenance Topics with due status: Not Due       Topic Last Completion Date    Colorectal Cancer Screening 06/08/2016    TETANUS VACCINE 08/26/2018    Eye Exam 08/01/2021    PROSTATE-SPECIFIC ANTIGEN 08/16/2021    Diabetes Urine Screening 08/16/2021    Foot Exam 08/16/2021    Lipid Panel 08/16/2021    Hemoglobin A1c 12/08/2021    Low Dose Statin 02/01/2022       HEALTH MAINTENANCE INTERVENTIONS - DUE OR DUE SOON  Health Maintenance Due   Topic Date Due    Pneumococcal Vaccines (Age 0-64) (1 of 2 - PPSV23) Never done    HIV Screening  Never done    Shingles Vaccine (1 of 2) Never done    Influenza Vaccine (1) Never done    COVID-19 Vaccine (3 - Booster for Pfizer series) 10/20/2021     Future Appointments   Date Time Provider Department Center   2/9/2022  9:00 AM [facility]   3/11/2022 10:30 AM PERRI Galvan MD [facility]      CHIEF COMPLAINT: Annual Wellness Visit (Preventive Services)    His diabetes remains uncontrolled. Review of pharmacy records reveal minimal adherence to taking his medication. This, I believe, is his major obstacle to controlling his diabetes and optimally treating his chronic health conditions. We had lengthy discussion about strategies to help him overcome any barriers to adherence to medication regimen. He agreed to enroll in Diabetes Digital Medicine program and keep his diabetes clinic appointments.    DIAGNOSES SPECIFICALLY EVALUATED AND TREATED THIS ENCOUNTER  1. Preventative health care    2. Suspected COVID-19 virus infection  - POCT COVID-19 Rapid Screening    3. Coryza  - POCT COVID-19 Rapid Screening    4. Dyslipidemia associated with type 2 diabetes mellitus    5. Type 2 diabetes mellitus with hyperglycemia, without long-term current use of insulin  - Diabetes Digital Medicine  (DDMP) Enrollment Order  - Diabetes Digital Medicine (DDMP): Assign Onboarding Questionnaires    6. Benign essential hypertension  - Hypertension Digital Medicine (HDMP) Enrollment Order  - Hypertension Digital Medicine (HDMP): Assign Onboarding Questionnaires    7. Non-alcoholic fatty liver disease  - US Elastography Liver; Future    8. Idiopathic chronic gout of multiple sites with tophus    9. Type 2 diabetes mellitus with stage 3b chronic kidney disease, without long-term current use of insulin  - empagliflozin (JARDIANCE) 10 mg tablet; Take 1 tablet (10 mg total) by mouth once daily.  Dispense: 30 tablet; Refill: 1    10. Need for COVID-19 vaccine    11. At increased risk for infections due to unvaccinated state AMA     Follow up in about 5 weeks (around 3/11/2022) for re-evaluate problem(s) discussed today.    Problem List Items Addressed This Visit     Idiopathic chronic gout of multiple sites with tophus (Chronic)    Current Assessment & Plan     Lab Results   Component Value Date    URICACID 6.1 03/19/2020    URICACID 3.2 (L) 12/04/2018    URICACID 8.6 (H) 11/08/2018    ESTGFRAFRICA >60.0 08/16/2021    EGFRNONAA >60.0 08/16/2021    CREATININE 1.2 08/16/2021    BUN 13 08/16/2021             Benign essential hypertension (Chronic)    Current Assessment & Plan     BP Readings from Last 6 Encounters:   02/01/22 120/70   01/07/22 120/78   12/08/21 106/72   09/29/21 130/78   09/16/21 118/64   08/16/21 130/80            Relevant Orders    Hypertension Digital Medicine (HDMP) Enrollment Order (Completed)    Hypertension Digital Medicine (HDMP): Assign Onboarding Questionnaires (Completed)    Dyslipidemia associated with type 2 diabetes mellitus (Chronic)    Current Assessment & Plan     Lab Results   Component Value Date    CHOL 186 08/16/2021    CHOL 119 (L) 11/19/2020    TRIG 209 (H) 08/16/2021    TRIG 144 11/19/2020    HDL 30 (L) 08/16/2021    HDL 26 (L) 11/19/2020    LDLCALC 114.2 08/16/2021    LDLCALC 64.2  11/19/2020    NONHDLCHOL 156 08/16/2021    NONHDLCHOL 93 11/19/2020    AST 43 (H) 08/16/2021    ALT 45 (H) 08/16/2021     The 10-year ASCVD risk score (Giladeo BABCOCK Jr., et al., 2013) is: 20.6%    Values used to calculate the score:      Age: 61 years      Sex: Male      Is Non- : No      Diabetic: Yes      Tobacco smoker: No      Systolic Blood Pressure: 120 mmHg      Is BP treated: Yes      HDL Cholesterol: 30 mg/dL      Total Cholesterol: 186 mg/dL          Non-alcoholic fatty liver disease (Chronic)    Overview     US Abdomen Limited  Narrative: EXAMINATION:  US ABDOMEN LIMITED    CLINICAL HISTORY:  Other disorders of bilirubin metabolism    TECHNIQUE:  Limited ultrasound of the right upper quadrant of the abdomen (including pancreas, liver, gallbladder, common bile duct, and right kidney) was performed.    COMPARISON:  None    FINDINGS:  Liver: Normal in size measuring 20.3 cm. The liver demonstrates homogeneously increased in echotexture most consistent with diffuse fatty infiltration.  No focal hepatic lesions are seen.    Biliary system: The gallbladder demonstrates no evidence of calculi. No gallbladder wall thickening.  No sonographic Beyer sign. No pericholecystic fluid. The common duct is not dilated, measuring 3.2 mm. No intrahepatic ductal dilatation.    Pancreas: The visualized portions of pancreas appear normal    Right kidney: Normal in size measuring 11.6 cm with no hydronephrosis.    Miscellaneous: No ascites.  Impression: 1. Hepatomegaly with increased echotexture of the liver suggesting diffuse fatty infiltration.  .    Electronically signed by: Juan Hebert DO  Date:    12/07/2018  Time:    09:03            Current Assessment & Plan     Lab Results   Component Value Date    AST 43 (H) 08/16/2021    AST 40 11/19/2020    AST 33 08/05/2020    ALT 45 (H) 08/16/2021    ALT 84 (H) 11/19/2020    ALT 55 (H) 08/05/2020             Relevant Orders    US Elastography Liver    Type 2  diabetes mellitus with hyperglycemia, without long-term current use of insulin    Current Assessment & Plan     Diabetes Management Status    Statin: Taking  ACE/ARB: Taking    Screening or Prevention Patient's value Goal Complete/Controlled?   HgA1C Testing and Control   Lab Results   Component Value Date    HGBA1C 11.9 (H) 12/08/2021      Annually/Less than 8% No   Lipid profile : 08/16/2021 Annually Yes   LDL control Lab Results   Component Value Date    LDLCALC 114.2 08/16/2021    Annually/Less than 100 mg/dl  No   Nephropathy screening Lab Results   Component Value Date    LABMICR <5.0 08/16/2021     No results found for: PROTEINUA Annually Yes   Blood pressure BP Readings from Last 1 Encounters:   02/01/22 120/70    Less than 140/90 Yes   Dilated retinal exam : 08/31/2020 Annually Yes   Foot exam   : 08/16/2021 Annually Yes     Lab Results   Component Value Date    HGBA1C 11.9 (H) 12/08/2021    HGBA1C 13.2 (H) 08/16/2021    HGBA1C 8.1 (H) 11/19/2020    ESTGFRAFRICA >60.0 08/16/2021    EGFRNONAA >60.0 08/16/2021    MICALBCREAT Unable to calculate 08/16/2021    LDLCALC 114.2 08/16/2021     Last 6 Patient Entered Readings                                          Most Recent A1c:     There is no flowsheet data to display.        HEALTH MAINTENANCE: Diabetic health maintenance interventions reviewed and are up to date except for:      Topic    Eye Exam              Relevant Medications    empagliflozin (JARDIANCE) 10 mg tablet    Other Relevant Orders    Diabetes Digital Medicine (DDMP) Enrollment Order (Completed)    Diabetes Digital Medicine (DDMP): Assign Onboarding Questionnaires (Completed)    At increased risk for infections due to unvaccinated state AMA      Other Visit Diagnoses     Preventative health care    -  Primary    Suspected COVID-19 virus infection        Relevant Orders    POCT COVID-19 Rapid Screening (Completed)    Coryza        Relevant Orders    POCT COVID-19 Rapid Screening (Completed)     Type 2 diabetes mellitus with stage 3b chronic kidney disease, without long-term current use of insulin        Relevant Medications    empagliflozin (JARDIANCE) 10 mg tablet    Need for COVID-19 vaccine          Unless noted herein, any chronic conditions are represented as and appear compensated/controlled and stable, and no other significant complaints or concerns were reported.    PRESCRIPTION DRUG MANAGEMENT  Outpatient Medications Prior to Visit   Medication Sig Dispense Refill    amLODIPine (NORVASC) 10 MG tablet Take 1 tablet (10 mg total) by mouth once daily. 90 tablet 4    ASCORBATE CALCIUM (VITAMIN C ORAL) Take by mouth every other day.      blood sugar diagnostic Strp Check blood glucose 1 time daily as directed and as needed (dispense insurance preferred brand or patient choice) 200 each 11    blood-glucose meter kit Check blood glucose 1 time daily as directed and as needed (dispense insurance preferred brand or patient choice) 1 each 0    colchicine (MITIGARE) 0.6 mg Cap Take 2 capsules at onset of gout attack. May take 1 more capsule 2 hours later if needed. MAX 3 CAPSULES PER DAY. MAX 12 CAPSULES PER WEEK. 90 capsule 1    doxepin (SINEQUAN) 10 MG capsule Take 1 to 2 capsules at bedtime as needed for sleep. 60 capsule 2    dulaglutide (TRULICITY) 3 mg/0.5 mL pen injector Inject 3 mg into the skin every 7 days. 12 pen 3    indomethacin (INDOCIN) 25 MG capsule Take 1 capsule (25 mg total) by mouth 3 (three) times daily as needed (ACUTE GOUT). 60 capsule 0    LACTOBAC NO.41/BIFIDOBACT NO.7 (PROBIOTIC-10 ORAL) Take by mouth.      lancets Misc Check blood glucose 1 time daily as directed and as needed (dispense insurance preferred brand or patient choice) 200 each 11    losartan-hydrochlorothiazide 100-25 mg (HYZAAR) 100-25 mg per tablet Take 1 tablet by mouth once daily. 90 tablet 14    rosuvastatin (CRESTOR) 40 MG Tab Take 1 tablet (40 mg total) by mouth every evening. (FOR CHOLESTEROL AND  "HEART HEALTH) 90 tablet 3    sertraline (ZOLOFT) 100 MG tablet Take 2 by mouth daily 60 tablet 2    varicella-zoster gE-AS01B, PF, (SHINGRIX) 50 mcg/0.5 mL injection Inject 0.5 mL (one dose) into muscle now; give second dose at least 2 months later 0.5 mL 1    empagliflozin (JARDIANCE) 10 mg tablet Take 1 tablet (10 mg total) by mouth once daily. 90 tablet 1    miconazole (MICOTIN) 2 % cream Apply topically 2 (two) times daily. for 10 days 14 g 0    pantoprazole (PROTONIX) 40 MG tablet Take 1 tablet (40 mg total) by mouth once daily. 90 tablet 3    valACYclovir (VALTREX) 500 MG tablet Take 1 tablet (500 mg total) by mouth 2 (two) times daily. for 3 days 6 tablet 3     No facility-administered medications prior to visit.     Medications Discontinued During This Encounter   Medication Reason    empagliflozin (JARDIANCE) 10 mg tablet Reorder     Medications Ordered This Encounter   Medications    empagliflozin (JARDIANCE) 10 mg tablet     Sig: Take 1 tablet (10 mg total) by mouth once daily.     Dispense:  30 tablet     Refill:  1     Dispense only 30-day QTY. Do not request 90-day QTY. Appointment REQUIRED for more refills.       Review of Systems   Respiratory: Negative for chest tightness and shortness of breath.    Cardiovascular: Negative for chest pain.   Endocrine: Negative for polydipsia and polyuria.      PHYSICAL EXAM  Vitals:    02/01/22 1335   BP: 120/70   BP Location: Left arm   Patient Position: Sitting   BP Method: Medium (Manual)   Pulse: 67   Temp: 98 °F (36.7 °C)   TempSrc: Tympanic   SpO2: 97%   Weight: 88 kg (194 lb 0.1 oz)   Height: 5' 9" (1.753 m)   CONSTITUTIONAL: Vital signs noted. No apparent distress. Does not appear acutely ill or septic. Appears adequately hydrated.  PULM: Lungs clear. Breathing unlabored.  HEART: Auscultation reveals regular rate and rhythm.  DERM: Skin warm and moist with normal turgor.  PSYCHIATRIC: Alert and conversant. Mood grossly neutral. Judgment and insight " "grossly intact.     PAST MEDICAL HISTORY  Shelton has a past medical history of Acute gout of left ankle, Anxiety with depression, Benign essential hypertension, Borderline systolic HTN, Diabetes mellitus, type 2, Gastroesophageal reflux disease without esophagitis, Non-alcoholic fatty liver disease, Prediabetes, Type 2 diabetes mellitus with hyperglycemia, without long-term current use of insulin, Type 2 diabetes mellitus with stage 3 chronic kidney disease, without long-term current use of insulin, Type 2 diabetes mellitus with stage 3a chronic kidney disease, without long-term current use of insulin, and Type 2 diabetes mellitus without complication, without long-term current use of insulin.    SURGICAL HISTORY  Shelton has no past surgical history on file.    FAMILY HISTORY  Shelton family history includes Diabetes in his brother, father, and mother; Heart disease in his brother, father, and mother; Hypertension in his brother, father, and mother.     ALLERGIES  Review of patient's allergies indicates:  No Known Allergies    SOCIAL HISTORY  Shelton  reports that he has never smoked. He has never used smokeless tobacco. He reports current alcohol use. He reports that he does not use drugs.     Documentation entered by me for this encounter may have been done in part using speech-recognition technology. Although I have made an effort to ensure accuracy, "sound like" errors may exist and should be interpreted in context.   "

## 2022-02-07 NOTE — TELEPHONE ENCOUNTER
Shelton Gamble.    I received your message about metformin and Jardiance.    I'm very happy to see that you have a Diabetes Clinic appointment Wednesday, February 9, 2022 at 9:00 AM with aKdy Funez NP. You will like IMANI Funez. She will take good care of you, and she will help you resolve the issue with your diabetes medicines.    All the best,    PERRI Galvan MD

## 2022-02-07 NOTE — PROGRESS NOTES
PCP: AUDREY Galvan MD    Subjective:     Chief Complaint: Diabetes Consult    HPI: 61 y.o.  male for diabetes consult.   Previously managed by KLAUS Andrade PA-C.   Last clinic visit 10/2019  Prediabetic for several years.  Type II diabetes since 2015 .  Comorbidities: HTN, HLD, Fatty Liver and Overweight by BMI  Diabetes complications: without complications   Last eye exam with [facility] 02/21, no DR.    Denies recent hospital admissions or ER visits.   Denies having hypoglycemia.   Admits to having a low BG reading entry of 52 in digital medicine, was asymptomatic, rechecked BG and was 141.  Admits to being inconsistent with DM medication from September- December 2021.   Endorses diabetes related symptoms: Polydipsia.    Blood glucose monitoring via fingerstick: 1 x/day   Enrolled in digital diabetes medicine.  Per digital diabetes medicine entry over the last 1 week,   Fasting , 116, 140, 144, 136, 165  In addition, per patient's BG log, over the last 6 weeks   Fasting -130 average 126    Activity: Moderately Active- running 3x/week  Diet:2-3 meals/day;infrequent snacks/day.    Medication compliance all of the time, diet compliance most of the time.   Has attended diabetes education in the past.      Previous regimen: Jardiance, Amaryl, Metformin     Past failed treatment: Metformin - diarrhea    Current DM Medications:   - Trulicity 3 mg weekly (Thursday) - taken 4 doses    Allergies  Review of patient's allergies indicates:  No Known Allergies    Labs Reviewed:  His most recent A1C is:  Lab Results   Component Value Date    HGBA1C 11.9 (H) 12/08/2021    HGBA1C 13.2 (H) 08/16/2021    HGBA1C 8.1 (H) 11/19/2020     His most recent BMP is:  Lab Results   Component Value Date     08/16/2021    K 3.9 08/16/2021    CL 98 08/16/2021    CO2 24 08/16/2021    BUN 13 08/16/2021    CREATININE 1.2 08/16/2021    CALCIUM 9.9 08/16/2021    ANIONGAP 15 08/16/2021    ESTGFRAFRICA >60.0  08/16/2021    EGFRNONAA >60.0 08/16/2021     Lab Results   Component Value Date    CPEPTIDE 2.75 06/28/2017     Lab Results   Component Value Date    GLUTAMICACID 0.00 06/28/2017     There is no height or weight on file to calculate BMI.    Wt Readings from Last 3 Encounters:   02/01/22 1335 88 kg (194 lb 0.1 oz)   01/07/22 0940 89.7 kg (197 lb 12 oz)   12/08/21 1352 87.9 kg (193 lb 12.6 oz)      His blood sugar in clinic today is: 222    Lab Results   Component Value Date    POCGLU 385 (A) 10/18/2019     Diabetes Management Status  Statin: Taking  ACE/ARB: Taking    Screening or Prevention Patient's value Goal Complete/Controlled?   HgA1C Testing and Control   Lab Results   Component Value Date    HGBA1C 11.9 (H) 12/08/2021      Annually/Less than 8% No   Lipid profile : 08/16/2021 Annually Yes   LDL control Lab Results   Component Value Date    LDLCALC 114.2 08/16/2021    Annually/Less than 100 mg/dl  No   Nephropathy screening Lab Results   Component Value Date    MICALBCREAT Unable to calculate 08/16/2021     No results found for: PROTEINUA Annually Yes   Blood pressure BP Readings from Last 1 Encounters:   02/01/22 120/70    Less than 140/90 Yes   Dilated retinal exam : 08/01/2021 Annually Yes    Foot exam   : 08/16/2021 Annually Yes     Social History     Socioeconomic History    Marital status:    Tobacco Use    Smoking status: Never Smoker    Smokeless tobacco: Never Used   Substance and Sexual Activity    Alcohol use: Yes    Drug use: No    Sexual activity: Never     Partners: Female     Social Determinants of Health     Financial Resource Strain: Medium Risk    Difficulty of Paying Living Expenses: Somewhat hard   Food Insecurity: Food Insecurity Present    Worried About Running Out of Food in the Last Year: Sometimes true    Ran Out of Food in the Last Year: Never true   Transportation Needs: No Transportation Needs    Lack of Transportation (Medical): No    Lack of Transportation  (Non-Medical): No   Physical Activity: Unknown    Days of Exercise per Week: 2 days   Stress: Stress Concern Present    Feeling of Stress : Rather much   Social Connections: Unknown    Frequency of Communication with Friends and Family: Twice a week    Frequency of Social Gatherings with Friends and Family: Twice a week    Active Member of Clubs or Organizations: Patient refused    Attends Club or Organization Meetings: Patient refused    Marital Status:    Housing Stability: High Risk    Unable to Pay for Housing in the Last Year: Patient refused    Number of Places Lived in the Last Year: 1    Unstable Housing in the Last Year: Yes     Past Medical History:   Diagnosis Date    Acute gout of left ankle 9/24/2018    Anxiety with depression 11/30/2018    Benign essential hypertension 11/8/2018    Borderline systolic HTN     Diabetes mellitus, type 2 Diagnosed 2015    Gastroesophageal reflux disease without esophagitis 11/16/2018    Non-alcoholic fatty liver disease 2/13/2019    Prediabetes     Type 2 diabetes mellitus with hyperglycemia, without long-term current use of insulin 8/8/2019    Type 2 diabetes mellitus with stage 3 chronic kidney disease, without long-term current use of insulin 8/8/2019    Type 2 diabetes mellitus with stage 3a chronic kidney disease, without long-term current use of insulin 8/8/2019    Type 2 diabetes mellitus without complication, without long-term current use of insulin 11/29/2017     Review of Systems   Constitutional: Negative for activity change, appetite change, fatigue and unexpected weight change.   HENT: Negative for dental problem and trouble swallowing.    Eyes: Negative for visual disturbance.   Respiratory: Negative for chest tightness and shortness of breath.    Cardiovascular: Negative for chest pain, palpitations and leg swelling.   Gastrointestinal: Negative for abdominal pain, constipation, diarrhea, nausea and vomiting.   Endocrine:  Positive for polydipsia. Negative for polyphagia and polyuria.   Genitourinary: Negative for difficulty urinating, dysuria, frequency and urgency.        Negative yeast infection   Musculoskeletal: Negative for gait problem, myalgias and neck pain.   Integumentary:  Negative for rash and wound.   Allergic/Immunologic: Negative.    Neurological: Negative for dizziness, syncope, weakness, numbness and headaches.   Hematological: Negative.    Psychiatric/Behavioral: Negative for confusion and sleep disturbance.   All other systems reviewed and are negative.       Objective:      Physical Exam  Vitals reviewed.   Constitutional:       Appearance: Normal appearance.   HENT:      Head: Normocephalic and atraumatic.   Eyes:      General: Vision grossly intact.      Extraocular Movements: Extraocular movements intact.      Pupils: Pupils are equal, round, and reactive to light.   Neck:      Thyroid: No thyroid mass or thyroid tenderness.   Cardiovascular:      Rate and Rhythm: Normal rate and regular rhythm.      Pulses: Normal pulses.           Radial pulses are 2+ on the right side and 2+ on the left side.        Dorsalis pedis pulses are 2+ on the right side and 2+ on the left side.      Heart sounds: Normal heart sounds.   Pulmonary:      Effort: Pulmonary effort is normal.      Breath sounds: Normal breath sounds.   Abdominal:      General: Bowel sounds are normal.      Palpations: Abdomen is soft.   Musculoskeletal:         General: Normal range of motion.      Cervical back: Normal range of motion and neck supple.      Right lower leg: No edema.      Left lower leg: No edema.      Right foot: No deformity or bunion.      Left foot: No deformity or bunion.   Feet:      Right foot:      Protective Sensation: 6 sites tested. 6 sites sensed.      Skin integrity: Skin integrity normal. No ulcer, skin breakdown, callus or dry skin.      Toenail Condition: Right toenails are normal.      Left foot:      Protective  Sensation: 6 sites tested. 6 sites sensed.      Skin integrity: Skin integrity normal. No ulcer, skin breakdown, callus or dry skin.      Toenail Condition: Left toenails are normal.      Comments: Pinprick exam completed with 10-g monofilament. Vibration sensation intact.  Lymphadenopathy:      Cervical: No cervical adenopathy.   Skin:     General: Skin is warm and dry.      Findings: No rash or wound.      Comments: Negative for acanthosis nigricans. Well-healed SQ injection sites.   Neurological:      Mental Status: He is alert and oriented to person, place, and time.      Coordination: Coordination is intact.      Gait: Gait is intact.   Psychiatric:         Attention and Perception: Attention normal.         Mood and Affect: Mood normal.         Speech: Speech normal.         Behavior: Behavior normal. Behavior is cooperative.         Thought Content: Thought content normal.         Cognition and Memory: Cognition normal.         Assessment / Plan:     Diagnoses and all orders for this visit:    Type 2 diabetes mellitus with hyperglycemia, without long-term current use of insulin  -     Ambulatory referral/consult to Diabetic Advanced Practice Providers (Medical Management)  -     empagliflozin (JARDIANCE) 10 mg tablet; Take 1 tablet (10 mg total) by mouth once daily.  -     Hemoglobin A1C; Future  -     POCT Glucose, Hand-Held Device    Benign essential hypertension    Dyslipidemia associated with type 2 diabetes mellitus    Non-alcoholic fatty liver disease    Additional Plan Details:  - Condition: uncontrolled, most recent A1c of 11.9% was completed 2 months ago, however BG reading the last 4 weeks are improving.  - Monitor blood glucose:  2x daily.Goals reviewed. Maintain BG log. Continue digital diabetes medicine program.  - Hypoglycemia protocol: Reviewed and risk discussed.  Notify if BG readings below 80. Protocol printout provided.   - Diet: Limit carbohydrate intake 30-45 grams/meal, 3x daily and 15  grams/snack , limit of two daily.   - Activity: Recommend at least 150 minutes of exercise in a week.  - BP and LDL: Recommend lifestyle modifications and follow up with PCP for management.   - Medication Changes:    - Continue Trulicity 3 mg weekly    - Resume Jardiance 10 mg daily    - Medication consideration: Pending BG log review and A1C result, may resume DELGADO if needed.  - Lab results: A1C discussed.  - Health Maintenance standards addressed today: Foot Exam - completed today and documented in physical exam with feedback to patient about proper diabetic foot care and findings, Eye Exam - completed outside of [facility]; will sign ROR today for records and A1c to be scheduled.   - Risks of uncontrolled DM explained. Importance of routine foot and eye exams reviewed. Scheduled as appropriate.  -The patient was explained the above plan and given opportunity to ask questions.  He understands, chooses and consents to this plan and accepts all the risks, which include but are not limited to the risks mentioned above.   - Labs ordered as above.  - CDE follow up: None  - Nurse visit: Deferred  - Follow up: 1 month to monitor BG readings and determine tolerance to Jardiance.       Kady Funez, FAZAL-C  [facility]    A total of 60 minutes was spent in face to face time, of which over 50 % was spent in counseling patient on disease process, complications, treatment, and side effects of medications.

## 2022-02-08 PROCEDURE — 99453 REM MNTR PHYSIOL PARAM SETUP: CPT | Mod: S$GLB,,, | Performed by: FAMILY MEDICINE

## 2022-02-08 PROCEDURE — 99453 PR REMOTE MONITR, PHYSIOL PARAM, INITIAL: ICD-10-PCS | Mod: S$GLB,,, | Performed by: FAMILY MEDICINE

## 2022-02-09 ENCOUNTER — OFFICE VISIT (OUTPATIENT)
Dept: DIABETES | Facility: CLINIC | Age: 62
End: 2022-02-09
Payer: COMMERCIAL

## 2022-02-09 ENCOUNTER — PATIENT OUTREACH (OUTPATIENT)
Dept: ADMINISTRATIVE | Facility: OTHER | Age: 62
End: 2022-02-09
Payer: COMMERCIAL

## 2022-02-09 VITALS
DIASTOLIC BLOOD PRESSURE: 72 MMHG | HEART RATE: 81 BPM | HEIGHT: 69 IN | BODY MASS INDEX: 29.42 KG/M2 | SYSTOLIC BLOOD PRESSURE: 124 MMHG | WEIGHT: 198.63 LBS

## 2022-02-09 DIAGNOSIS — K76.0 NON-ALCOHOLIC FATTY LIVER DISEASE: Chronic | ICD-10-CM

## 2022-02-09 DIAGNOSIS — I10 BENIGN ESSENTIAL HYPERTENSION: Chronic | ICD-10-CM

## 2022-02-09 DIAGNOSIS — E11.65 TYPE 2 DIABETES MELLITUS WITH HYPERGLYCEMIA, WITHOUT LONG-TERM CURRENT USE OF INSULIN: Primary | ICD-10-CM

## 2022-02-09 DIAGNOSIS — E78.5 DYSLIPIDEMIA ASSOCIATED WITH TYPE 2 DIABETES MELLITUS: Chronic | ICD-10-CM

## 2022-02-09 DIAGNOSIS — E11.69 DYSLIPIDEMIA ASSOCIATED WITH TYPE 2 DIABETES MELLITUS: Chronic | ICD-10-CM

## 2022-02-09 LAB — GLUCOSE SERPL-MCNC: 222 MG/DL (ref 70–110)

## 2022-02-09 PROCEDURE — 3008F PR BODY MASS INDEX (BMI) DOCUMENTED: ICD-10-PCS | Mod: CPTII,S$GLB,, | Performed by: NURSE PRACTITIONER

## 2022-02-09 PROCEDURE — 3078F DIAST BP <80 MM HG: CPT | Mod: CPTII,S$GLB,, | Performed by: NURSE PRACTITIONER

## 2022-02-09 PROCEDURE — 3008F BODY MASS INDEX DOCD: CPT | Mod: CPTII,S$GLB,, | Performed by: NURSE PRACTITIONER

## 2022-02-09 PROCEDURE — 3078F PR MOST RECENT DIASTOLIC BLOOD PRESSURE < 80 MM HG: ICD-10-PCS | Mod: CPTII,S$GLB,, | Performed by: NURSE PRACTITIONER

## 2022-02-09 PROCEDURE — 3074F SYST BP LT 130 MM HG: CPT | Mod: CPTII,S$GLB,, | Performed by: NURSE PRACTITIONER

## 2022-02-09 PROCEDURE — 3074F PR MOST RECENT SYSTOLIC BLOOD PRESSURE < 130 MM HG: ICD-10-PCS | Mod: CPTII,S$GLB,, | Performed by: NURSE PRACTITIONER

## 2022-02-09 PROCEDURE — 1160F PR REVIEW ALL MEDS BY PRESCRIBER/CLIN PHARMACIST DOCUMENTED: ICD-10-PCS | Mod: CPTII,S$GLB,, | Performed by: NURSE PRACTITIONER

## 2022-02-09 PROCEDURE — 82962 POCT GLUCOSE, HAND-HELD DEVICE: ICD-10-PCS | Mod: S$GLB,,, | Performed by: NURSE PRACTITIONER

## 2022-02-09 PROCEDURE — 99999 PR PBB SHADOW E&M-EST. PATIENT-LVL V: CPT | Mod: PBBFAC,,, | Performed by: NURSE PRACTITIONER

## 2022-02-09 PROCEDURE — 99999 PR PBB SHADOW E&M-EST. PATIENT-LVL V: ICD-10-PCS | Mod: PBBFAC,,, | Performed by: NURSE PRACTITIONER

## 2022-02-09 PROCEDURE — 82962 GLUCOSE BLOOD TEST: CPT | Mod: S$GLB,,, | Performed by: NURSE PRACTITIONER

## 2022-02-09 PROCEDURE — 1159F PR MEDICATION LIST DOCUMENTED IN MEDICAL RECORD: ICD-10-PCS | Mod: CPTII,S$GLB,, | Performed by: NURSE PRACTITIONER

## 2022-02-09 PROCEDURE — 1160F RVW MEDS BY RX/DR IN RCRD: CPT | Mod: CPTII,S$GLB,, | Performed by: NURSE PRACTITIONER

## 2022-02-09 PROCEDURE — 99215 PR OFFICE/OUTPT VISIT, EST, LEVL V, 40-54 MIN: ICD-10-PCS | Mod: S$GLB,,, | Performed by: NURSE PRACTITIONER

## 2022-02-09 PROCEDURE — 99215 OFFICE O/P EST HI 40 MIN: CPT | Mod: S$GLB,,, | Performed by: NURSE PRACTITIONER

## 2022-02-09 PROCEDURE — 1159F MED LIST DOCD IN RCRD: CPT | Mod: CPTII,S$GLB,, | Performed by: NURSE PRACTITIONER

## 2022-02-09 RX ORDER — EMPAGLIFLOZIN 10 MG/1
10 TABLET, FILM COATED ORAL DAILY
Qty: 30 TABLET | Refills: 2 | Status: SHIPPED | OUTPATIENT
Start: 2022-02-09 | End: 2022-02-22 | Stop reason: SDUPTHER

## 2022-02-09 NOTE — PATIENT INSTRUCTIONS
Medication Regimen/Changes:   - Continue Trulicity 3 mg weekly   - Resume Jardiance 10 mg daily    Patient Instructions:   Carbohydrate Count: 30-45 grams/meal and 15 grams/snack with limit of 2 snacks per day.   Exercise: Goal is 150 minutes or more per week.   Bring meter and blood sugar log to each appointment.     Goals for Blood Sugar:   Fastin-130 mg/dl   2 hours after meal:  mg/dl   Before Bed: 100-150 mg/dl   If Blood sugar is below 70 mg/dl, DO NOT take diabetes medication. Eat first and then recheck blood sugar in 20 minutes.   Foods to eat if blood sugar is below 70 mg/dl  o 1/2 glass of orange juice  o  1/2 regular cola can  o  3-4 hard candies  o  3-4 small glucose tabs.    Foods to eat if blood sugar is below 50 mg/dl  o 1 glass of orange juice  o 1 regular cola can  o 8 hard candies  o 8 small glucose tabs.    If you have repeated eating/blood sugar recheck process 2 times and blood sugar still below 70 mg/dl, call 911.

## 2022-02-16 ENCOUNTER — CLINICAL SUPPORT (OUTPATIENT)
Dept: DIABETES | Facility: CLINIC | Age: 62
End: 2022-02-16
Payer: COMMERCIAL

## 2022-02-16 VITALS — HEIGHT: 69 IN | WEIGHT: 198.63 LBS | BODY MASS INDEX: 29.42 KG/M2

## 2022-02-16 DIAGNOSIS — E11.22 TYPE 2 DIABETES MELLITUS WITH STAGE 3B CHRONIC KIDNEY DISEASE, WITHOUT LONG-TERM CURRENT USE OF INSULIN: Chronic | ICD-10-CM

## 2022-02-16 DIAGNOSIS — N18.32 TYPE 2 DIABETES MELLITUS WITH STAGE 3B CHRONIC KIDNEY DISEASE, WITHOUT LONG-TERM CURRENT USE OF INSULIN: Chronic | ICD-10-CM

## 2022-02-16 PROCEDURE — 99999 PR PBB SHADOW E&M-EST. PATIENT-LVL III: ICD-10-PCS | Mod: PBBFAC,,, | Performed by: DIETITIAN, REGISTERED

## 2022-02-16 PROCEDURE — G0108 DIAB MANAGE TRN  PER INDIV: HCPCS | Mod: S$GLB,,, | Performed by: DIETITIAN, REGISTERED

## 2022-02-16 PROCEDURE — G0108 PR DIAB MANAGE TRN  PER INDIV: ICD-10-PCS | Mod: S$GLB,,, | Performed by: DIETITIAN, REGISTERED

## 2022-02-16 PROCEDURE — 99999 PR PBB SHADOW E&M-EST. PATIENT-LVL III: CPT | Mod: PBBFAC,,, | Performed by: DIETITIAN, REGISTERED

## 2022-02-16 NOTE — Clinical Note
Tye Bond, Wanted to forward you and update since I was unable to [facility] fu visit. Thx, Ang ....    Pt became upset in visit when I stated difference between household chores (laundry or walking from room to room) and structured exercise (walking or jumping jacks) so as to encouraged progress with moderate intensity exercises. I appologized not meaning to offend him. He seemed agitated. He asked if visit was done, stood up and left clinic room.   Unable to [facility] fu since pt left visit abruptly.

## 2022-02-16 NOTE — PROGRESS NOTES
Diabetes Care Specialist Progress Note  Author: Wen Guerra RD, CDE  Date: 2/16/2022    Program Intake  Reason for Diabetes Program Visit:: Intervention  Type of Intervention:: Individual  Education: Self-Management Skill Review  Current diabetes risk level:: high  In the last 12 months, have you:  Pt denies ER/hosp visits but states he had to admit son recently.   Permission to speak with others about care:: no    Lab Results   Component Value Date    HGBA1C 11.9 (H) 12/08/2021       Clinical    Problem Review  Active comorbidities affecting diabetes self-care.: yes (HTN, HLD, Fatty Liver, GERD, Gout, Overweight by BMI)    Clinical Assessment  Current Diabetes Treatment: Diet,Exercise,Injectable,Oral Medication (Trulicity 3mg weekly, Jardiance 10mg 1tab daily (pt not taking, states insurance needing PA))  Have you ever experienced hypoglycemia (low blood sugar)?: no  Have you ever experienced hyperglycemia (high blood sugar)?: no    Medication Information  How many days a week do you miss your medications?: Never (pt reports missed Trulicity injections prior to Dec due to social stressors but has improved dosing)  Do you sometimes have difficulty refilling your medications?: Yes (see comment) (Per pt report from pharm, Jardiance not covered due to needing PA; message sent to Ashley.)  Medication adherence impacting ability to self-manage diabetes?: Yes    Labs  Do you have regular lab work to monitor your medications?: Yes  Type of Regular Lab Work: A1c,Cholesterol,Microalbumin,CBC,BMP  Where do you get your labs drawn?: [facility]  Lab Compliance Barriers: No   Pt commented that A1C lab was elevated because he was having social stressors, shoulder pain.      Nutritional Status  Diet:  (Per recall, pt reports 3meals with snack. Excess carb from irregular portions (~0-5servings/meal), refined starch, dried fruit. Inadequate nonstarchy vegetables.)  Meal Plan 24 Hour Recall - Breakfast: cinnamon roll OR  cereal (keto cinnamon squares mixed with dried fruit and nuts), whole milk OR eggs, vicente 3slc, cheese, 2slc bread (white)  Meal Plan 24 Hour Recall - Lunch: Firehouse sub sandwich (turkey, cheese, white bread), chips - sf crystal light  Meal Plan 24 Hour Recall - Dinner: steak - 1 glass wine  Meal Plan 24 Hour Recall - Snack: nuts, dried fruit; vasu: water, sf  Change in appetite?: No  Recent Changes in Weight: Weight Loss (Wt decreased ~11 lbs since 8/16/21)  Was weight loss intentional or unintentional?: Unintentional  Current nutritional status an area of need that is impacting patient's ability to self-manage diabetes?: No    Additional Social History    Support  Does anyone support you with your diabetes care?: no  Who supports you?: self  Who takes you to your medical appointments?: self  Is Support an area impacting ability to self-manage diabetes?: No    Access to Mass Media & Technology  Does the patient have access to any of the following devices or technologies?: Smart phone,Internet Access  Media or technology needs impacting ability to self-manage diabetes?: No    Cognitive/Behavioral Health  Alert and Oriented: Yes  Difficulty Thinking: No  Requires Prompting: No  Requires assistance for routine expression?: No  Cognitive or behavioral barriers impacting ability to self-manage diabetes?: No    Culture/Holiness  Culture or Buddhist beliefs that may impact ability to access healthcare: No    Communication  Language preference: English  Hearing Problems: No  Vision Problems: Yes  Vision problem type:: Decreased Vision  Vision Assistance: Glasses  Communication needs impacting ability to self-manage diabetes?: No    Health Literacy  Preferred Learning Method: Face to Face,Reading Materials  How often do you need to have someone help you read instructions, pamphlets, or written material from your doctor or pharmacy?: Never  Health literacy needs impacting ability to self-manage diabetes?: No      Diabetes  Self-Management Skills Assessment    Diabetes Disease Process/Treatment Options  Patient/caregiver able to state what happens when someone has diabetes.: yes  Patient/caregiver knows what type of diabetes they have.: yes  Diabetes Type : Type II  Diabetes Disease Process/Treatment Options: Skills Assessment Completed: Yes  Assessment indicates:: Adequate understanding  Area of need?: No    Nutrition/Healthy Eating  Challenges to healthy eating::  (irregular carb intake or servings per meal)  Method of carbohydrate measurement:: no method  Patient can identify foods that impact blood sugar.:  (some -- pt states potaotes, select fruits (not all fruits), white rice, bread)  Patient-identified foods:: starches (bread, pasta, rice, cereal)  Nutrition/Healthy Eating Skills Assessment Completed:: Yes  Assessment indicates:: Knowledge deficit  Area of need?: Yes    Physical Activity/Exercise  Patient's daily activity level:: sedentary  Patient formally exercises outside of work.: yes  Reasons for not exercising:: time constraints  Exercise Type:  (pt reports walking, jumping jacks, laundry, stairs at home)  Frequency:  (daily)  Duration:  (intermittent frequency)  Stated forms of physical activity:: any movement performed by muscles that uses energy  Patient can identify reasons why exercise/physical activity is important in diabetes management.: yes  Identified reasons:: helps insulin work better  Physical Activity/Exercise Skills Assessment Completed: : Yes  Assessment indicates:: Adequate understanding  Area of need?: Yes    Medications  Patient is able to describe current diabetes management routine.: yes  Diabetes management routine:: injectable medications,oral medications,diet,exercise  Patient is able to identify current diabetes medications, dosages, and appropriate timing of medications.: yes  Patient understands the purpose of the medications taken for diabetes.: no  Patient reports problems or concerns with  current medication regimen.: yes  Medication regimen problems/concerns::  (Jardiance needing PA)  Medication Skills Assessment Completed:: Yes  Assessment indicates:: Knowledge deficit,Instruction Needed  Area of need?: Yes    Home Blood Glucose Monitoring  Patient states that blood sugar is checked at home daily.: yes  Monitoring Method:: home glucometer  How often do you check your blood sugar?: Once a day  When do you check your blood sugar?: Before breakfast  When you check what is your typical blood sugar range? : per digital diabetes records, [facility] BG 52, 136-163; no other testing  Home Blood Glucose Monitoring Skills Assessment Completed: : Yes  Assessment indicates:: Instruction Needed,Knowledge deficit  Area of need?: Yes    Acute Complications  Patient is able to identify types of acute complications: No  Assessment indicates:: Knowledge deficit,Instruction Needed  Area of need?: Yes    Assessment Summary and Plan  Based on today's diabetes care assessment, the following areas of need were identified:      Social 2/16/2022   Support No   Access to Mass Media/Tech No   Cognitive/Behavioral Health No   Culture/Restorationist No   Communication No   Health Literacy No        Clinical 2/16/2022   Medication Adherence Yes   Message sent to Ashley regarding Jardiance needing PA per patient report.   Lab Compliance No   Nutritional Status No        Diabetes Self-Management Skills 2/16/2022   Diabetes Disease Process/Treatment Options No   Nutrition/Healthy Eating Yes - see care plan   Physical Activity/Exercise Yes  Discussed importance of daily physical activity with review of benefits, types, guidelines and precautions.     Pt became upset in visit when I stated difference between household chores (laundry or walking from room to room) and structured exercise (walking or jumping jacks) so as to encouraged progress with moderate intensity exercises. I appologized not meaning to offend him. He seemed agitated. He  asked if visit was done, stood up and left clinic room.     Unable to shay fu since pt left visit abruptly.      Medication Yes   Message sent to Ashley regarding Jardiance needing PA per patient report.   Home Blood Glucose Monitoring Yes - see care plan   Acute Complications Yes  Discussed prevention, identification signs/symptoms and treatment of hyperglycemia and hypoglycemia and when to contact clinic.       Today's interventions were provided through individual discussion, instruction, and written materials were provided.    Patient verbalized understanding of instruction and written materials.  Pt was able to return back demonstration of instructions today. Patient understood key points, needs reinforcement and further instruction.     Diabetes Self-Management Care Plan:    Today's Diabetes Self-Management Care Plan was developed with Shelton's input. Shelton has agreed to work toward the following goal(s) to improve his/her overall diabetes control.      Care Plan: Diabetes Management   Updates made since 1/17/2022 12:00 AM      Problem: Healthy Eating       Goal: Eat 3 meals daily with 30-60g/2-4 servings of Carbohydrate per meal.    Start Date: 10/12/2021   Expected End Date: 4/12/2022   This Visit's Progress: Not met   Priority: High   Barriers: No Barriers Identified   Note:    Reviewed carb counting, portion control, importance of spacing meals throughout the day to prevent post prandial elevations. Encouraged low saturated fat, low sodium diet to aid in control of hypertension and cholesterol.     Problem: Blood Glucose Self-Monitoring       Goal: Patient agrees to check and record blood sugars 2-3 times per day.    Start Date: 10/12/2021   Expected End Date: 4/12/2022   This Visit's Progress: Not met   Priority: High   Barriers: No Barriers Identified          Follow Up Plan   Follow up in about 3 months (around 5/16/2022). Unable to shay fu since pt left visit abruptly.     Today's care plan and  follow up schedule was discussed with patient.  Shelton verbalized understanding of the care plan, goals, and agrees to follow up plan.        The patient was encouraged to communicate with his/her health care provider/physician and care team regarding his/her condition(s) and treatment.  I provided the patient with my contact information today and encouraged to contact me via phone or [facility] Patient Portal as needed.     Length of Visit   Total Time: 75 Minutes

## 2022-02-18 ENCOUNTER — PATIENT MESSAGE (OUTPATIENT)
Dept: DIABETES | Facility: CLINIC | Age: 62
End: 2022-02-18
Payer: COMMERCIAL

## 2022-02-18 LAB
LEFT EYE DM RETINOPATHY: NEGATIVE
RIGHT EYE DM RETINOPATHY: NEGATIVE

## 2022-02-21 ENCOUNTER — TELEPHONE (OUTPATIENT)
Dept: INTERNAL MEDICINE | Facility: CLINIC | Age: 62
End: 2022-02-21
Payer: COMMERCIAL

## 2022-02-21 ENCOUNTER — PATIENT MESSAGE (OUTPATIENT)
Dept: INTERNAL MEDICINE | Facility: CLINIC | Age: 62
End: 2022-02-21

## 2022-02-21 ENCOUNTER — PATIENT MESSAGE (OUTPATIENT)
Dept: DIABETES | Facility: CLINIC | Age: 62
End: 2022-02-21
Payer: COMMERCIAL

## 2022-02-21 DIAGNOSIS — E11.65 TYPE 2 DIABETES MELLITUS WITH HYPERGLYCEMIA, WITHOUT LONG-TERM CURRENT USE OF INSULIN: ICD-10-CM

## 2022-02-21 DIAGNOSIS — E11.65 TYPE 2 DIABETES MELLITUS WITH HYPERGLYCEMIA, WITHOUT LONG-TERM CURRENT USE OF INSULIN: Primary | ICD-10-CM

## 2022-02-21 PROCEDURE — 99454 PR REMOTE MNTR, PHYS PARAM, INITIAL, EA 30 DAYS: ICD-10-PCS | Mod: S$GLB,,, | Performed by: FAMILY MEDICINE

## 2022-02-21 PROCEDURE — 99454 REM MNTR PHYSIOL PARAM 16-30: CPT | Mod: S$GLB,,, | Performed by: FAMILY MEDICINE

## 2022-02-21 RX ORDER — EMPAGLIFLOZIN 10 MG/1
10 TABLET, FILM COATED ORAL DAILY
Qty: 30 TABLET | Refills: 2 | Status: CANCELLED | OUTPATIENT
Start: 2022-02-21

## 2022-02-21 NOTE — TELEPHONE ENCOUNTER
At 2:28 PM today you wrote: You need to send scripts to [facility] or [facility]...si they give you  authorization...!!!    Twelve minutes later, at 2:40 PM you wrote: So why are you holding up my medicine NOW!! Why are you having to review my medicine!!! Get my medicine done, be the servant to me as you should.    Shelton Gamble.    I reviewed your chart, and it appears that you should have a supply of the medicines you are currently taking sufficient to last until your appointment with me on March 11.     Can you be more specific? Are you asking for a refill of specific medicines, or are you asking for ALL your medicines to be sent to [facility]?    I'll await your response.    All the best,    PERRI Galvan MD

## 2022-02-21 NOTE — TELEPHONE ENCOUNTER
Spoke with patient and informed him that his message was sent to Dr. Galvan that he sent on the My Chart patient portal.

## 2022-02-21 NOTE — TELEPHONE ENCOUNTER
----- Message from Uriah Woods sent at 2/21/2022  2:48 PM CST -----  Contact: Pt  States his jardiance rx is being held up and is wanting to know wht's going on / states he was told the Dr has to go over the notes/ has been out for several weeks and his numbers are going up and can be reached at [phone]//thanks/dbw

## 2022-02-22 ENCOUNTER — PATIENT MESSAGE (OUTPATIENT)
Dept: DIABETES | Facility: CLINIC | Age: 62
End: 2022-02-22
Payer: COMMERCIAL

## 2022-02-22 ENCOUNTER — PATIENT OUTREACH (OUTPATIENT)
Dept: ADMINISTRATIVE | Facility: HOSPITAL | Age: 62
End: 2022-02-22
Payer: COMMERCIAL

## 2022-02-22 RX ORDER — EMPAGLIFLOZIN 10 MG/1
10 TABLET, FILM COATED ORAL DAILY
Qty: 90 TABLET | Refills: 1 | Status: SHIPPED | OUTPATIENT
Start: 2022-02-22 | End: 2022-04-04 | Stop reason: DRUGHIGH

## 2022-03-23 PROCEDURE — 99454 REM MNTR PHYSIOL PARAM 16-30: CPT | Mod: S$GLB,,, | Performed by: FAMILY MEDICINE

## 2022-03-23 PROCEDURE — 99454 PR REMOTE MNTR, PHYS PARAM, INITIAL, EA 30 DAYS: ICD-10-PCS | Mod: S$GLB,,, | Performed by: FAMILY MEDICINE

## 2022-03-29 LAB
ALBUMIN/GLOBULIN RATIO: 1.8 (ref 1.2–2.2)
ALBUMIN: 5.2 G/DL (ref 3.8–4.9)
ALP SERPL-CCNC: 73 LU/L (ref 44–121)
ALT SERPL W P-5'-P-CCNC: 21 IU/L (ref 0–44)
AST SERPL-CCNC: 24 IU/L (ref 0–40)
BILIRUB SERPL-MCNC: 0.7 MG/DL (ref 0–1.2)
BUN SERPL-MCNC: 22 MG/DL (ref 6–24)
BUN/CREAT SERPL: 18 (ref 9–20)
CALCIUM SERPL-MCNC: 9.7 MG/DL (ref 8.7–10.2)
CHLORIDE SERPL-SCNC: 100 MMOL/L (ref 96–106)
CHOLESTEROL, TOTAL: 228 MG/DL (ref 100–199)
CO2 SERPL-SCNC: 24 MMOL/L (ref 20–29)
CREAT SERPL-MCNC: 1.2 MG/DL (ref 0.8–1.3)
CREATININE URINE: 102.6
EGFR: 70
GLOBULIN: 2.9 G/DL (ref 1.5–4.5)
GLUCOSE SERPL-MCNC: 143 MG/DL (ref 65–99)
HBA1C MFR BLD: 7.9 % (ref 4.8–5.6)
HCV AB SER-ACNC: NEGATIVE
HDLC SERPL-MCNC: 31 MG/DL
LDLC SERPL CALC-MCNC: 167 MG/DL (ref 0–99)
MICROALB/CREAT RATIO: 8
MICROALBUMIN URINE: 7.7
POTASSIUM SERPL-SCNC: 4.3 MMOL/L (ref 3.5–5.2)
PROT SNV-MCNC: 8.1 G/DL (ref 6–8.5)
SODIUM BLD-SCNC: 141 MMOL/L (ref 134–144)
TRIGL SERPL-MCNC: 159 MG/DL (ref 0–149)
VLDLC SERPL-MCNC: 30 MG/DL (ref 5–40)

## 2022-04-01 ENCOUNTER — PATIENT OUTREACH (OUTPATIENT)
Dept: ADMINISTRATIVE | Facility: OTHER | Age: 62
End: 2022-04-01
Payer: COMMERCIAL

## 2022-04-04 ENCOUNTER — OFFICE VISIT (OUTPATIENT)
Dept: DIABETES | Facility: CLINIC | Age: 62
End: 2022-04-04
Payer: COMMERCIAL

## 2022-04-04 DIAGNOSIS — I10 BENIGN ESSENTIAL HYPERTENSION: ICD-10-CM

## 2022-04-04 DIAGNOSIS — E11.65 TYPE 2 DIABETES MELLITUS WITH HYPERGLYCEMIA, WITHOUT LONG-TERM CURRENT USE OF INSULIN: Primary | ICD-10-CM

## 2022-04-04 DIAGNOSIS — E11.69 DYSLIPIDEMIA ASSOCIATED WITH TYPE 2 DIABETES MELLITUS: ICD-10-CM

## 2022-04-04 DIAGNOSIS — E78.5 DYSLIPIDEMIA ASSOCIATED WITH TYPE 2 DIABETES MELLITUS: ICD-10-CM

## 2022-04-04 PROCEDURE — 1159F PR MEDICATION LIST DOCUMENTED IN MEDICAL RECORD: ICD-10-PCS | Mod: CPTII,S$GLB,, | Performed by: NURSE PRACTITIONER

## 2022-04-04 PROCEDURE — 3051F HG A1C>EQUAL 7.0%<8.0%: CPT | Mod: CPTII,S$GLB,, | Performed by: NURSE PRACTITIONER

## 2022-04-04 PROCEDURE — 1160F PR REVIEW ALL MEDS BY PRESCRIBER/CLIN PHARMACIST DOCUMENTED: ICD-10-PCS | Mod: CPTII,S$GLB,, | Performed by: NURSE PRACTITIONER

## 2022-04-04 PROCEDURE — 1159F MED LIST DOCD IN RCRD: CPT | Mod: CPTII,S$GLB,, | Performed by: NURSE PRACTITIONER

## 2022-04-04 PROCEDURE — 99999 PR PBB SHADOW E&M-EST. PATIENT-LVL III: ICD-10-PCS | Mod: PBBFAC,,, | Performed by: NURSE PRACTITIONER

## 2022-04-04 PROCEDURE — 99999 PR PBB SHADOW E&M-EST. PATIENT-LVL III: CPT | Mod: PBBFAC,,, | Performed by: NURSE PRACTITIONER

## 2022-04-04 PROCEDURE — 1160F RVW MEDS BY RX/DR IN RCRD: CPT | Mod: CPTII,S$GLB,, | Performed by: NURSE PRACTITIONER

## 2022-04-04 PROCEDURE — 3051F PR MOST RECENT HEMOGLOBIN A1C LEVEL 7.0 - < 8.0%: ICD-10-PCS | Mod: CPTII,S$GLB,, | Performed by: NURSE PRACTITIONER

## 2022-04-04 PROCEDURE — 99214 OFFICE O/P EST MOD 30 MIN: CPT | Mod: S$GLB,,, | Performed by: NURSE PRACTITIONER

## 2022-04-04 PROCEDURE — 99214 PR OFFICE/OUTPT VISIT, EST, LEVL IV, 30-39 MIN: ICD-10-PCS | Mod: S$GLB,,, | Performed by: NURSE PRACTITIONER

## 2022-04-04 RX ORDER — EMPAGLIFLOZIN 25 MG/1
25 TABLET, FILM COATED ORAL DAILY
Qty: 30 TABLET | Refills: 3 | Status: SHIPPED | OUTPATIENT
Start: 2022-04-04 | End: 2022-06-05 | Stop reason: SDUPTHER

## 2022-04-04 NOTE — PROGRESS NOTES
PCP: AUDREY Galvan MD    Subjective:     Chief Complaint: Diabetes follow up.  Last visit with me: 2/9/2022 for initial consult.    HPI: 61 y.o.  male for diabetes follow up.   Previously managed by KLAUS Andrade PA-C.   Last clinic visit 10/2019.  Prediabetic for several years.  Type II diabetes since 2015 .  Comorbidities: HTN, HLD, Fatty Liver and Overweight by BMI  Diabetes complications: without complications   Last eye exam with [facility] 02/21, no DR.    Interval history:  Denies recent hospital admissions or ER visits.   Denies having hypoglycemia.   Endorses diabetes related symptoms: Polydipsia, improving.    Blood glucose monitoring via fingerstick: 1 x/day   Enrolled in digital diabetes medicine.  Per digital diabetes medicine entries over the last 1 month,   Fasting BG range 102-169 average 140-150's    Activity: Moderately Active- running 3x/week  Diet:2-3 meals/day;infrequent snacks/day.    Medication compliance all of the time, diet compliance most of the time.   Has attended diabetes education in the past.      Previous regimen: Jardiance, Amaryl, Metformin     Past failed treatment: Metformin - diarrhea    Current DM Medications:   - Trulicity 3 mg weekly (Thursday)    - Jardiance 10 mg qd    Allergies  Review of patient's allergies indicates:  No Known Allergies    Labs Reviewed:  His most recent A1C is:  Lab Results   Component Value Date    HGBA1C 7.9 (H) 03/29/2022    HGBA1C 11.9 (H) 12/08/2021    HGBA1C 13.2 (H) 08/16/2021     His most recent BMP is:  Lab Results   Component Value Date     08/16/2021    K 3.9 08/16/2021    CL 98 08/16/2021    CO2 24 08/16/2021    BUN 13 08/16/2021    CREATININE 1.2 08/16/2021    CALCIUM 9.9 08/16/2021    ANIONGAP 15 08/16/2021    ESTGFRAFRICA >60.0 08/16/2021    EGFRNONAA >60.0 08/16/2021       Lab Results   Component Value Date    CPEPTIDE 2.75 06/28/2017     Lab Results   Component Value Date    GLUTAMICACID 0.00 06/28/2017        There is no height or weight on file to calculate BMI.    Weight lost 1 lb since last visit.  Wt Readings from Last 3 Encounters:   02/16/22 0956 90.1 kg (198 lb 10.2 oz)   02/09/22 0905 90.1 kg (198 lb 10.2 oz)   02/01/22 1335 88 kg (194 lb 0.1 oz)      His blood sugar in clinic today is: 151    Lab Results   Component Value Date    POCGLU 222 (A) 02/09/2022     Diabetes Management Status  Statin: Taking  ACE/ARB: Taking    Screening or Prevention Patient's value Goal Complete/Controlled?   HgA1C Testing and Control   Lab Results   Component Value Date    HGBA1C 7.9 (H) 03/29/2022      Annually/Less than 8% Yes   Lipid profile : 03/29/2022 Annually Yes   LDL control Lab Results   Component Value Date    LDLCALC 114.2 08/16/2021    Annually/Less than 100 mg/dl  No   Nephropathy screening Lab Results   Component Value Date    MICALBCREAT Unable to calculate 08/16/2021     No results found for: PROTEINUA Annually Yes   Blood pressure BP Readings from Last 1 Encounters:   02/09/22 124/72    Less than 140/90 Yes   Dilated retinal exam : 02/18/2022 Annually Yes    Foot exam   : 02/09/2022 Annually Yes     Social History     Socioeconomic History    Marital status:    Tobacco Use    Smoking status: Never Smoker    Smokeless tobacco: Never Used   Substance and Sexual Activity    Alcohol use: Yes    Drug use: No    Sexual activity: Never     Partners: Female     Social Determinants of Health     Financial Resource Strain: Medium Risk    Difficulty of Paying Living Expenses: Somewhat hard   Food Insecurity: Food Insecurity Present    Worried About Running Out of Food in the Last Year: Sometimes true    Ran Out of Food in the Last Year: Never true   Transportation Needs: No Transportation Needs    Lack of Transportation (Medical): No    Lack of Transportation (Non-Medical): No   Physical Activity: Unknown    Days of Exercise per Week: 2 days   Stress: Stress Concern Present    Feeling of Stress :  Rather much   Social Connections: Unknown    Frequency of Communication with Friends and Family: Twice a week    Frequency of Social Gatherings with Friends and Family: Twice a week    Active Member of Clubs or Organizations: Patient refused    Attends Club or Organization Meetings: Patient refused    Marital Status:    Housing Stability: High Risk    Unable to Pay for Housing in the Last Year: Patient refused    Number of Places Lived in the Last Year: 1    Unstable Housing in the Last Year: Yes     Past Medical History:   Diagnosis Date    Acute gout of left ankle 9/24/2018    Anxiety with depression 11/30/2018    Benign essential hypertension 11/8/2018    Borderline systolic HTN     Diabetes mellitus, type 2 Diagnosed 2015    Gastroesophageal reflux disease without esophagitis 11/16/2018    Non-alcoholic fatty liver disease 2/13/2019    Prediabetes     Type 2 diabetes mellitus with hyperglycemia, without long-term current use of insulin 8/8/2019    Type 2 diabetes mellitus with stage 3 chronic kidney disease, without long-term current use of insulin 8/8/2019    Type 2 diabetes mellitus with stage 3a chronic kidney disease, without long-term current use of insulin 8/8/2019    Type 2 diabetes mellitus without complication, without long-term current use of insulin 11/29/2017     Review of Systems   Constitutional: Negative for activity change, appetite change, fatigue and unexpected weight change.   HENT: Negative for dental problem and trouble swallowing.    Eyes: Negative for visual disturbance.   Respiratory: Negative for chest tightness and shortness of breath.    Cardiovascular: Negative for chest pain, palpitations and leg swelling.   Gastrointestinal: Negative for abdominal pain, constipation, diarrhea, nausea and vomiting.   Endocrine: Positive for polydipsia, improving. Negative for polyphagia and polyuria.   Genitourinary: Negative for difficulty urinating, dysuria, frequency  and urgency.        Negative yeast infection   Musculoskeletal: Negative for gait problem, myalgias and neck pain.   Integumentary:  Negative for rash and wound.   Allergic/Immunologic: Negative.    Neurological: Negative for dizziness, syncope, weakness, numbness and headaches.   Hematological: Negative.    Psychiatric/Behavioral: Negative for confusion and sleep disturbance.   All other systems reviewed and are negative.     Objective:      Physical Exam  Vitals reviewed.   Constitutional:       Appearance: Normal appearance.   HENT:      Head: Normocephalic and atraumatic.   Eyes:      General: Vision grossly intact.      Extraocular Movements: Extraocular movements intact.      Pupils: Pupils are equal, round, and reactive to light.   Neck:      Thyroid: No thyroid mass or thyroid tenderness.   Cardiovascular:      Rate and Rhythm: Normal rate and regular rhythm.      Pulses: Normal pulses.           Radial pulses are 2+ on the right side and 2+ on the left side.        Dorsalis pedis pulses are 2+ on the right side and 2+ on the left side.      Heart sounds: Normal heart sounds.   Pulmonary:      Effort: Pulmonary effort is normal.      Breath sounds: Normal breath sounds.   Abdominal:      General: Bowel sounds are normal.      Palpations: Abdomen is soft.   Musculoskeletal:         General: Normal range of motion.      Cervical back: Normal range of motion and neck supple.      Right lower leg: No edema.      Left lower leg: No edema.      Right foot: No deformity or bunion.      Left foot: No deformity or bunion.   Feet:      Right foot:      Skin integrity: Skin integrity normal. No ulcer, skin breakdown, callus or dry skin.      Toenail Condition: Right toenails are normal.      Left foot:      Skin integrity: Skin integrity normal. No ulcer, skin breakdown, callus or dry skin.      Toenail Condition: Left toenails are normal.   Lymphadenopathy:      Cervical: No cervical adenopathy.   Skin:     General:  Skin is warm and dry.      Findings: No rash or wound.      Comments: Negative for acanthosis nigricans. Well-healed SQ injection sites.   Neurological:      Mental Status: He is alert and oriented to person, place, and time.      Coordination: Coordination is intact.      Gait: Gait is intact.   Psychiatric:         Attention and Perception: Attention normal.         Mood and Affect: Mood normal.         Speech: Speech normal.         Behavior: Behavior normal. Behavior is cooperative.         Thought Content: Thought content normal.         Cognition and Memory: Cognition normal.     Assessment / Plan:     Diagnoses and all orders for this visit:    Type 2 diabetes mellitus with hyperglycemia, without long-term current use of insulin  -     POCT Glucose, Hand-Held Device  -     empagliflozin (JARDIANCE) 25 mg tablet; Take 1 tablet (25 mg total) by mouth once daily.  -     Hemoglobin A1C; Future    Benign essential hypertension    Dyslipidemia associated with type 2 diabetes mellitus    Additional Plan Details:  - Condition: suboptimal control, most recent A1c of 7.9% was completed 1 week ago.  - Monitor blood glucose:  2x daily.Goals reviewed. Maintain BG log. Continue digital diabetes medicine program.  - Hypoglycemia protocol: Reviewed and risk discussed.  Notify if BG readings below 80. Protocol printout provided.   - Diet: Limit carbohydrate intake 30-45 grams/meal, 3x daily and 15 grams/snack , limit of two daily.   - Activity: Recommend at least 150 minutes of exercise in a week.  - BP and LDL: Recommend lifestyle modifications and follow up with PCP for management.   - Medication Changes:    - Continue Trulicity 3 mg weekly    - Increase Jardiance 25 mg daily    - Lab results: A1C discussed.  - Health Maintenance standards addressed today: A1c to be scheduled.   - Risks of uncontrolled DM explained. Importance of routine foot and eye exams reviewed. Scheduled as appropriate.  -The patient was explained the  above plan and given opportunity to ask questions.  He understands, chooses and consents to this plan and accepts all the risks, which include but are not limited to the risks mentioned above.   - Labs ordered as above.  - CDE follow up: None  - Nurse visit: Deferred  - Follow up: 1 month to monitor BG readings and determine tolerance to Jardiance.       Kady Funez, FNP-C [facility]    A total of 30 minutes was spent in face to face time, of which over 50 % was spent in counseling patient on disease process, complications, treatment, and side effects of medications.

## 2022-04-04 NOTE — PATIENT INSTRUCTIONS
Medication Regimen/Changes:   - Continue Trulicity 3 mg weekly   - Increase Jardiance 25 mg daily    Patient Instructions:  Carbohydrate Count: 30-45 grams/meal and 15 grams/snack with limit of 2 snacks per day.  Exercise: Goal is 150 minutes or more per week.  Bring meter and blood sugar log to each appointment.     Goals for Blood Sugar:  Fastin-130 mg/dl  2 hours after meal:  mg/dl  Before Bed: 100-150 mg/dl  If Blood sugar is below 70 mg/dl, DO NOT take diabetes medication. Eat first and then recheck blood sugar in 20 minutes.  Foods to eat if blood sugar is below 70 mg/dl  1/2 glass of orange juice   1/2 regular cola can   3-4 hard candies   3-4 small glucose tabs.   Foods to eat if blood sugar is below 50 mg/dl  1 glass of orange juice  1 regular cola can  8 hard candies  8 small glucose tabs.   If you have repeated eating/blood sugar recheck process 2 times and blood sugar still below 70 mg/dl, call 911.

## 2022-04-18 ENCOUNTER — PATIENT MESSAGE (OUTPATIENT)
Dept: OTHER | Facility: OTHER | Age: 62
End: 2022-04-18
Payer: COMMERCIAL

## 2022-05-04 ENCOUNTER — PATIENT OUTREACH (OUTPATIENT)
Dept: ADMINISTRATIVE | Facility: HOSPITAL | Age: 62
End: 2022-05-04
Payer: COMMERCIAL

## 2022-05-09 ENCOUNTER — PATIENT MESSAGE (OUTPATIENT)
Dept: OTHER | Facility: OTHER | Age: 62
End: 2022-05-09
Payer: COMMERCIAL

## 2022-05-21 PROCEDURE — 99454 REM MNTR PHYSIOL PARAM 16-30: CPT | Mod: S$GLB,,, | Performed by: PEDIATRICS

## 2022-05-21 PROCEDURE — 99454 PR REMOTE MNTR, PHYS PARAM, INITIAL, EA 30 DAYS: ICD-10-PCS | Mod: S$GLB,,, | Performed by: PEDIATRICS

## 2022-06-02 ENCOUNTER — OFFICE VISIT (OUTPATIENT)
Dept: PSYCHIATRY | Facility: CLINIC | Age: 62
End: 2022-06-02
Payer: COMMERCIAL

## 2022-06-02 VITALS
BODY MASS INDEX: 29.91 KG/M2 | DIASTOLIC BLOOD PRESSURE: 76 MMHG | SYSTOLIC BLOOD PRESSURE: 122 MMHG | HEART RATE: 81 BPM | WEIGHT: 201.94 LBS | HEIGHT: 69 IN

## 2022-06-02 DIAGNOSIS — F41.1 GENERALIZED ANXIETY DISORDER: Primary | ICD-10-CM

## 2022-06-02 DIAGNOSIS — G47.00 INSOMNIA, UNSPECIFIED TYPE: ICD-10-CM

## 2022-06-02 PROCEDURE — 3078F DIAST BP <80 MM HG: CPT | Mod: CPTII,S$GLB,, | Performed by: PSYCHIATRY & NEUROLOGY

## 2022-06-02 PROCEDURE — 3074F SYST BP LT 130 MM HG: CPT | Mod: CPTII,S$GLB,, | Performed by: PSYCHIATRY & NEUROLOGY

## 2022-06-02 PROCEDURE — 3008F BODY MASS INDEX DOCD: CPT | Mod: CPTII,S$GLB,, | Performed by: PSYCHIATRY & NEUROLOGY

## 2022-06-02 PROCEDURE — 3008F PR BODY MASS INDEX (BMI) DOCUMENTED: ICD-10-PCS | Mod: CPTII,S$GLB,, | Performed by: PSYCHIATRY & NEUROLOGY

## 2022-06-02 PROCEDURE — 99999 PR PBB SHADOW E&M-EST. PATIENT-LVL II: CPT | Mod: PBBFAC,,, | Performed by: PSYCHIATRY & NEUROLOGY

## 2022-06-02 PROCEDURE — 99999 PR PBB SHADOW E&M-EST. PATIENT-LVL II: ICD-10-PCS | Mod: PBBFAC,,, | Performed by: PSYCHIATRY & NEUROLOGY

## 2022-06-02 PROCEDURE — 3074F PR MOST RECENT SYSTOLIC BLOOD PRESSURE < 130 MM HG: ICD-10-PCS | Mod: CPTII,S$GLB,, | Performed by: PSYCHIATRY & NEUROLOGY

## 2022-06-02 PROCEDURE — 99214 PR OFFICE/OUTPT VISIT, EST, LEVL IV, 30-39 MIN: ICD-10-PCS | Mod: S$GLB,,, | Performed by: PSYCHIATRY & NEUROLOGY

## 2022-06-02 PROCEDURE — 3078F PR MOST RECENT DIASTOLIC BLOOD PRESSURE < 80 MM HG: ICD-10-PCS | Mod: CPTII,S$GLB,, | Performed by: PSYCHIATRY & NEUROLOGY

## 2022-06-02 PROCEDURE — 99214 OFFICE O/P EST MOD 30 MIN: CPT | Mod: S$GLB,,, | Performed by: PSYCHIATRY & NEUROLOGY

## 2022-06-02 RX ORDER — DOXEPIN HYDROCHLORIDE 10 MG/1
CAPSULE ORAL
Qty: 60 CAPSULE | Refills: 3 | Status: SHIPPED | OUTPATIENT
Start: 2022-06-02

## 2022-06-02 RX ORDER — SERTRALINE HYDROCHLORIDE 100 MG/1
TABLET, FILM COATED ORAL
Qty: 60 TABLET | Refills: 3 | Status: SHIPPED | OUTPATIENT
Start: 2022-06-02

## 2022-06-02 NOTE — PROGRESS NOTES
"Outpatient Psychiatry Follow-up Visit (MD/NP)    2022    Shelton Young, a 62 y.o. male, presenting for follow-up visit. Met with patient.    Reason for Encounter: Patient complains of hx of low moods.     Interval: Patient seen and interviewed for follow-up, last seen about 4 months ago. New relationship going well, has mood in with her. Improving sleep. Father  since last visit. Has a new job at [email or web address]. Health is improving. A1C has dropped. Some inadherence around the time of father's death. Sleep is variable. Participating in psychotherapy.     Background: Pt is a 61 y/o M who presents for establishment of care, endorses low moods, poor sleep, feeling stressed in context of relationship and parenting stressors. Started on sertraline by PCP (increases energy, may have worsened sleep), also prescribed xanax which he hasn't taken.     , estranged from wife - he describes their relationship as her having been verbally abusive, denigrating him "to try and control me", "but after a while I started to believe her". Wife was arrested for child endangerment in January, report submitted by step-son. Had video from surveillance camera going in the house. Has custody of his kids. Has had help from a  for childcare. Was briefly staying with friends after moving out.     Wife out on bail, pending trial.  from wife in  after 10 years of marriage. 3 kids - 10, 8, 5. 2 years ago, lost my sister. "haven't mourned that".     Psych Hx: No previous assessments  one previous long periods of sadness or losses of interest     Past Medical History:   Diagnosis Date    Acute gout of left ankle 2018    Anxiety with depression 2018    Benign essential hypertension 2018    Borderline systolic HTN     Diabetes mellitus, type 2 Diagnosed     Gastroesophageal reflux disease without esophagitis 2018    Non-alcoholic fatty liver disease 2019    Prediabetes     Type " "2 diabetes mellitus with hyperglycemia, without long-term current use of insulin 2019    Type 2 diabetes mellitus with stage 3 chronic kidney disease, without long-term current use of insulin 2019    Type 2 diabetes mellitus with stage 3a chronic kidney disease, without long-term current use of insulin 2019    Type 2 diabetes mellitus without complication, without long-term current use of insulin 2017   Family Hx: uncle - PTSD from WWII.     Medical Hx:   Past Medical History:   Diagnosis Date    Acute gout of left ankle 2018    Anxiety with depression 2018    Benign essential hypertension 2018    Borderline systolic HTN     Diabetes mellitus, type 2 Diagnosed     Gastroesophageal reflux disease without esophagitis 2018    Non-alcoholic fatty liver disease 2019    Prediabetes     Type 2 diabetes mellitus with hyperglycemia, without long-term current use of insulin 2019    Type 2 diabetes mellitus with stage 3 chronic kidney disease, without long-term current use of insulin 2019    Type 2 diabetes mellitus with stage 3a chronic kidney disease, without long-term current use of insulin 2019    Type 2 diabetes mellitus without complication, without long-term current use of insulin 2017     Social Hx: born & raised in . Grew up with both parents in the household. Father was a . 2 brothers, 3 sisters. Youngest brother  when he was 15, youngest sister  when she was 50. Denies maltreatment growing up. Childhood was happy, liked school. Graduated HS. Played sports. Did 120 credits at [employer], but didn't graduate. 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     Kids - going to school (hybrid distance/in-classroom). Tries to facilitate their learning when they're at home. They go to [employer].     , estranged from wife - she was verbally abusive, denigrating him "to try and control me", "but " "after a while I started to believe her". Wife was arrested for child endangerment in January, report submitted by step-son. Had video from surveillance camera going in the house. Has custody of his kids. Has had help from a  for childcare. Was briefly staying with friends after     Wife out on bail, pending trial.  from wife in August '19 after 10 years of marriage. 3 kids - 10, 8, 5. 2 years ago, lost my sister. "haven't mourned that".      x 2. First marriage was 10 years, ended due to his infidelity. Has 2 kids from that marriage. They live in [place], [place]. He's estranged from his daughter, but has a relationship with his son.     retired in August - living off severance from [employer] after working there for 13 years. Previously was a ,  x 20 years.     Review Of Systems:     GENERAL:  No weight gain or loss  SKIN:  No rashes or lacerations  HEAD:  No headaches  CHEST:  No shortness of breath, hyperventilation or cough  CARDIOVASCULAR:  No tachycardia or chest pain  ABDOMEN:  No nausea, vomiting, pain, constipation or diarrhea  URINARY:  No frequency, dysuria or sexual dysfunction  ENDOCRINE:  No polydipsia, polyuria  MUSCULOSKELETAL:  No pain or stiffness of the joints  NEUROLOGIC:  No weakness, sensory changes, seizures, confusion, memory loss, tremor or other abnormal movements    Current Evaluation:     Nutritional Screening: Considering the patient's height and weight, medications, medical history and preferences, should a referral be made to the dietitian? no    Constitutional  Vitals:  Most recent vital signs, dated less than 90 days prior to this appointment, were not reviewed.       General:  unremarkable, age appropriate     Musculoskeletal  Muscle Strength/Tone:  no tremor, no tic   Gait & Station:  non-ataxic     Psychiatric  Appearance: casually dressed & groomed;   Behavior: calm,   Cooperation: cooperative with assessment  Speech: normal rate, volume, " tone  Thought Process: linear, goal-directed  Thought Content: No suicidal or homicidal ideation; no delusions  Affect: normal range  Mood: euthymic  Perceptions: No auditory or visual hallucinations  Level of Consciousness: alert throughout interview  Insight: fair  Cognition: Oriented to person, place, time, & situation  Memory: no apparent deficits to general clinical interview; not formally assessed  Attention/Concentration: no apparent deficits to general clinical interview; not formally assessed  Fund of Knowledge: average by vocabulary/education    Laboratory Data  No visits with results within 1 Month(s) from this visit.   Latest known visit with results is:   Patient Outreach on 02/22/2022   Component Date Value Ref Range Status    Left Eye DM Retinopathy 02/18/2022 Negative   Final    Right Eye DM Retinopathy 02/18/2022 Negative   Final     Medications  Outpatient Encounter Medications as of 6/2/2022   Medication Sig Dispense Refill    amLODIPine (NORVASC) 10 MG tablet Take 1 tablet (10 mg total) by mouth once daily. 90 tablet 4    ASCORBATE CALCIUM (VITAMIN C ORAL) Take by mouth every other day.      blood sugar diagnostic Strp Check blood glucose 1 time daily as directed and as needed (dispense insurance preferred brand or patient choice) 200 each 11    blood-glucose meter kit Check blood glucose 1 time daily as directed and as needed (dispense insurance preferred brand or patient choice) 1 each 0    colchicine (MITIGARE) 0.6 mg Cap Take 2 capsules at onset of gout attack. May take 1 more capsule 2 hours later if needed. MAX 3 CAPSULES PER DAY. MAX 12 CAPSULES PER WEEK. 90 capsule 1    doxepin (SINEQUAN) 10 MG capsule Take 1 to 2 capsules at bedtime as needed for sleep. 60 capsule 2    dulaglutide (TRULICITY) 3 mg/0.5 mL pen injector Inject 3 mg into the skin every 7 days. 12 pen 3    empagliflozin (JARDIANCE) 25 mg tablet Take 1 tablet (25 mg total) by mouth once daily. 30 tablet 3     indomethacin (INDOCIN) 25 MG capsule Take 1 capsule (25 mg total) by mouth 3 (three) times daily as needed (ACUTE GOUT). 60 capsule 0    LACTOBAC NO.41/BIFIDOBACT NO.7 (PROBIOTIC-10 ORAL) Take by mouth.      lancets Misc Check blood glucose 1 time daily as directed and as needed (dispense insurance preferred brand or patient choice) 200 each 11    losartan-hydrochlorothiazide 100-25 mg (HYZAAR) 100-25 mg per tablet Take 1 tablet by mouth once daily. 90 tablet 14    miconazole (MICOTIN) 2 % cream Apply topically 2 (two) times daily. for 10 days 14 g 0    pantoprazole (PROTONIX) 40 MG tablet Take 1 tablet (40 mg total) by mouth once daily. 90 tablet 3    rosuvastatin (CRESTOR) 40 MG Tab Take 1 tablet (40 mg total) by mouth every evening. (FOR CHOLESTEROL AND HEART HEALTH) 90 tablet 3    sertraline (ZOLOFT) 100 MG tablet Take 2 by mouth daily 60 tablet 2    valACYclovir (VALTREX) 500 MG tablet Take 1 tablet (500 mg total) by mouth 2 (two) times daily. for 3 days 6 tablet 3    varicella-zoster gE-AS01B, PF, (SHINGRIX) 50 mcg/0.5 mL injection Inject 0.5 mL (one dose) into muscle now; give second dose at least 2 months later 0.5 mL 1     No facility-administered encounter medications on file as of 6/2/2022.     Assessment - Diagnosis - Goals:     Impression: 63 y/o male with generally good baseline mental health with worse anxiety, in context of life stressors (see above). Modest improvement with sertraline increases. sleep improved with doxepin. Tolerates treatment ok.     Treatment Goals:  Specify outcomes written in observable, behavioral terms: reduce anxiety.     Treatment Plan/Recommendations:   · Sertraline 200 mg daily.   · doxepin 10 - 20 mg qhs for sleep.  · Encouraged psychotherapy, how to access.   · Discussed risks, benefits, and alternatives to treatment plan documented above with patient. I answered all patient questions related to this plan and patient expressed understanding and agreement.      Return to Clinic: 2 months    MOISES Painting MD  Psychiatry  [facility]  [address] , [place] [address] [phone]

## 2022-07-12 ENCOUNTER — OFFICE VISIT (OUTPATIENT)
Dept: DIABETES | Facility: CLINIC | Age: 62
End: 2022-07-12
Payer: COMMERCIAL

## 2022-07-12 VITALS
WEIGHT: 197.31 LBS | HEART RATE: 85 BPM | DIASTOLIC BLOOD PRESSURE: 75 MMHG | SYSTOLIC BLOOD PRESSURE: 114 MMHG | BODY MASS INDEX: 29.22 KG/M2 | HEIGHT: 69 IN

## 2022-07-12 DIAGNOSIS — E11.69 DYSLIPIDEMIA ASSOCIATED WITH TYPE 2 DIABETES MELLITUS: ICD-10-CM

## 2022-07-12 DIAGNOSIS — I10 BENIGN ESSENTIAL HYPERTENSION: ICD-10-CM

## 2022-07-12 DIAGNOSIS — E78.5 DYSLIPIDEMIA ASSOCIATED WITH TYPE 2 DIABETES MELLITUS: ICD-10-CM

## 2022-07-12 DIAGNOSIS — E11.65 TYPE 2 DIABETES MELLITUS WITH HYPERGLYCEMIA, WITHOUT LONG-TERM CURRENT USE OF INSULIN: Primary | ICD-10-CM

## 2022-07-12 PROCEDURE — 3078F PR MOST RECENT DIASTOLIC BLOOD PRESSURE < 80 MM HG: ICD-10-PCS | Mod: CPTII,S$GLB,, | Performed by: NURSE PRACTITIONER

## 2022-07-12 PROCEDURE — 99214 OFFICE O/P EST MOD 30 MIN: CPT | Mod: S$GLB,,, | Performed by: NURSE PRACTITIONER

## 2022-07-12 PROCEDURE — 99214 PR OFFICE/OUTPT VISIT, EST, LEVL IV, 30-39 MIN: ICD-10-PCS | Mod: S$GLB,,, | Performed by: NURSE PRACTITIONER

## 2022-07-12 PROCEDURE — 3051F HG A1C>EQUAL 7.0%<8.0%: CPT | Mod: CPTII,S$GLB,, | Performed by: NURSE PRACTITIONER

## 2022-07-12 PROCEDURE — 3008F BODY MASS INDEX DOCD: CPT | Mod: CPTII,S$GLB,, | Performed by: NURSE PRACTITIONER

## 2022-07-12 PROCEDURE — 1159F PR MEDICATION LIST DOCUMENTED IN MEDICAL RECORD: ICD-10-PCS | Mod: CPTII,S$GLB,, | Performed by: NURSE PRACTITIONER

## 2022-07-12 PROCEDURE — 3074F SYST BP LT 130 MM HG: CPT | Mod: CPTII,S$GLB,, | Performed by: NURSE PRACTITIONER

## 2022-07-12 PROCEDURE — 3008F PR BODY MASS INDEX (BMI) DOCUMENTED: ICD-10-PCS | Mod: CPTII,S$GLB,, | Performed by: NURSE PRACTITIONER

## 2022-07-12 PROCEDURE — 1160F PR REVIEW ALL MEDS BY PRESCRIBER/CLIN PHARMACIST DOCUMENTED: ICD-10-PCS | Mod: CPTII,S$GLB,, | Performed by: NURSE PRACTITIONER

## 2022-07-12 PROCEDURE — 3078F DIAST BP <80 MM HG: CPT | Mod: CPTII,S$GLB,, | Performed by: NURSE PRACTITIONER

## 2022-07-12 PROCEDURE — 99999 PR PBB SHADOW E&M-EST. PATIENT-LVL IV: CPT | Mod: PBBFAC,,, | Performed by: NURSE PRACTITIONER

## 2022-07-12 PROCEDURE — 1159F MED LIST DOCD IN RCRD: CPT | Mod: CPTII,S$GLB,, | Performed by: NURSE PRACTITIONER

## 2022-07-12 PROCEDURE — 3074F PR MOST RECENT SYSTOLIC BLOOD PRESSURE < 130 MM HG: ICD-10-PCS | Mod: CPTII,S$GLB,, | Performed by: NURSE PRACTITIONER

## 2022-07-12 PROCEDURE — 1160F RVW MEDS BY RX/DR IN RCRD: CPT | Mod: CPTII,S$GLB,, | Performed by: NURSE PRACTITIONER

## 2022-07-12 PROCEDURE — 3051F PR MOST RECENT HEMOGLOBIN A1C LEVEL 7.0 - < 8.0%: ICD-10-PCS | Mod: CPTII,S$GLB,, | Performed by: NURSE PRACTITIONER

## 2022-07-12 PROCEDURE — 99999 PR PBB SHADOW E&M-EST. PATIENT-LVL IV: ICD-10-PCS | Mod: PBBFAC,,, | Performed by: NURSE PRACTITIONER

## 2022-07-12 NOTE — PROGRESS NOTES
PCP: AUDREY Galvan MD    Subjective:     Chief Complaint: Diabetes follow up.  Last visit with me: 4/4/2022    HPI: 61 y.o.  male for diabetes follow up.   Previously managed by KLAUS Andrade PA-C.   Last clinic visit 10/2019.  Prediabetic for several years.  Type II diabetes since 2015 .  Comorbidities: HTN, HLD, Fatty Liver and Overweight by BMI  Diabetes complications: without complications     Interval history:  Denies recent hospital admissions or ER visits.   Denies having hypoglycemia.   Endorses diabetes related symptoms: Polydipsia, improving.    Blood glucose monitoring via fingerstick: 1 x/day   Enrolled in digital diabetes medicine.  Per digital diabetes medicine entries over the last 1 month,   Fasting BG range 130-190    Activity: Moderately Active- running 3x/week  Diet:2-3 meals/day;infrequent snacks/day.    Medication compliance all of the time, diet compliance most of the time.   Has attended diabetes education in the past.      Previous regimen: Jardiance, Amaryl, Metformin     Past failed treatment: Metformin - diarrhea    Current DM Medications:   - Trulicity 3 mg weekly (Thursday)    - Jardiance 25 mg every morning    Allergies  Review of patient's allergies indicates:  No Known Allergies    Labs Reviewed:  His most recent A1C is:  Lab Results   Component Value Date    HGBA1C 7.9 (H) 03/29/2022    HGBA1C 11.9 (H) 12/08/2021    HGBA1C 13.2 (H) 08/16/2021       His most recent BMP is:  Lab Results   Component Value Date     08/16/2021    K 3.9 08/16/2021    CL 98 08/16/2021    CO2 24 08/16/2021    BUN 13 08/16/2021    CREATININE 1.2 08/16/2021    CALCIUM 9.9 08/16/2021    ANIONGAP 15 08/16/2021    ESTGFRAFRICA >60.0 08/16/2021    EGFRNONAA >60.0 08/16/2021       Lab Results   Component Value Date    CPEPTIDE 2.75 06/28/2017     Lab Results   Component Value Date    GLUTAMICACID 0.00 06/28/2017       Body mass index is 29.14 kg/m².    Weight lost 1 lb since last visit.  Wt  Readings from Last 3 Encounters:   07/12/22 1049 89.5 kg (197 lb 5 oz)   02/16/22 0956 90.1 kg (198 lb 10.2 oz)   02/09/22 0905 90.1 kg (198 lb 10.2 oz)        His blood sugar in clinic today is: 162    Lab Results   Component Value Date    POCGLU 222 (A) 02/09/2022       Diabetes Management Status  Statin: Taking  ACE/ARB: Taking    Screening or Prevention Patient's value Goal Complete/Controlled?   HgA1C Testing and Control   Lab Results   Component Value Date    HGBA1C 7.9 (H) 03/29/2022      Annually/Less than 8% Yes   Lipid profile : 03/29/2022 Annually Yes   LDL control Lab Results   Component Value Date    LDLCALC 114.2 08/16/2021    Annually/Less than 100 mg/dl  No   Nephropathy screening Lab Results   Component Value Date    MICALBCREAT Unable to calculate 08/16/2021     No results found for: PROTEINUA Annually Yes   Blood pressure BP Readings from Last 1 Encounters:   07/12/22 114/75    Less than 140/90 Yes   Dilated retinal exam : 02/18/2022 Annually Yes    Foot exam   : 02/09/2022 Annually Yes     Social History     Socioeconomic History    Marital status:    Tobacco Use    Smoking status: Never Smoker    Smokeless tobacco: Never Used   Substance and Sexual Activity    Alcohol use: Yes    Drug use: No    Sexual activity: Never     Partners: Female     Social Determinants of Health     Financial Resource Strain: Medium Risk    Difficulty of Paying Living Expenses: Somewhat hard   Food Insecurity: Food Insecurity Present    Worried About Running Out of Food in the Last Year: Sometimes true    Ran Out of Food in the Last Year: Never true   Transportation Needs: No Transportation Needs    Lack of Transportation (Medical): No    Lack of Transportation (Non-Medical): No   Physical Activity: Unknown    Days of Exercise per Week: 2 days   Stress: Stress Concern Present    Feeling of Stress : Rather much   Social Connections: Unknown    Frequency of Communication with Friends and Family:  Twice a week    Frequency of Social Gatherings with Friends and Family: Twice a week    Active Member of Clubs or Organizations: Patient refused    Attends Club or Organization Meetings: Patient refused    Marital Status:    Housing Stability: High Risk    Unable to Pay for Housing in the Last Year: Patient refused    Number of Places Lived in the Last Year: 1    Unstable Housing in the Last Year: Yes     Past Medical History:   Diagnosis Date    Acute gout of left ankle 9/24/2018    Anxiety with depression 11/30/2018    Benign essential hypertension 11/8/2018    Borderline systolic HTN     Diabetes mellitus, type 2 Diagnosed 2015    Gastroesophageal reflux disease without esophagitis 11/16/2018    Non-alcoholic fatty liver disease 2/13/2019    Prediabetes     Type 2 diabetes mellitus with hyperglycemia, without long-term current use of insulin 8/8/2019    Type 2 diabetes mellitus with stage 3 chronic kidney disease, without long-term current use of insulin 8/8/2019    Type 2 diabetes mellitus with stage 3a chronic kidney disease, without long-term current use of insulin 8/8/2019    Type 2 diabetes mellitus without complication, without long-term current use of insulin 11/29/2017       Review of Systems   Constitutional: Negative for activity change, appetite change, fatigue and unexpected weight change.   HENT: Negative for dental problem and trouble swallowing.    Eyes: Negative for visual disturbance.   Respiratory: Negative for chest tightness and shortness of breath.    Cardiovascular: Negative for chest pain, palpitations and leg swelling.   Gastrointestinal: Negative for abdominal pain, constipation, diarrhea, nausea and vomiting.   Endocrine: Positive for polydipsia, improving. Negative for polyphagia and polyuria.   Genitourinary: Negative for difficulty urinating, dysuria, frequency and urgency.        Negative yeast infection   Musculoskeletal: Negative for gait problem,  myalgias and neck pain.   Integumentary:  Negative for rash and wound.   Allergic/Immunologic: Negative.    Neurological: Negative for dizziness, syncope, weakness, numbness and headaches.   Hematological: Negative.    Psychiatric/Behavioral: Negative for confusion and sleep disturbance.   All other systems reviewed and are negative.     Objective:      Physical Exam  Vitals reviewed.   Constitutional:       Appearance: Normal appearance.   HENT:      Head: Normocephalic and atraumatic.   Eyes:      General: Vision grossly intact.      Extraocular Movements: Extraocular movements intact.      Pupils: Pupils are equal, round, and reactive to light.   Neck:      Thyroid: No thyroid mass or thyroid tenderness.   Cardiovascular:      Rate and Rhythm: Normal rate and regular rhythm.      Pulses: Normal pulses.           Radial pulses are 2+ on the right side and 2+ on the left side.        Dorsalis pedis pulses are 2+ on the right side and 2+ on the left side.      Heart sounds: Normal heart sounds.   Pulmonary:      Effort: Pulmonary effort is normal.      Breath sounds: Normal breath sounds.   Abdominal:      General: Bowel sounds are normal.      Palpations: Abdomen is soft.   Musculoskeletal:         General: Normal range of motion.      Cervical back: Normal range of motion and neck supple.      Right lower leg: No edema.      Left lower leg: No edema.      Right foot: No deformity or bunion.      Left foot: No deformity or bunion.   Feet:      Right foot:      Skin integrity: Skin integrity normal. No ulcer, skin breakdown, callus or dry skin.      Toenail Condition: Right toenails are normal.      Left foot:      Skin integrity: Skin integrity normal. No ulcer, skin breakdown, callus or dry skin.      Toenail Condition: Left toenails are normal.   Lymphadenopathy:      Cervical: No cervical adenopathy.   Skin:     General: Skin is warm and dry.      Findings: No rash or wound.      Comments: Negative for  acanthosis nigricans. Well-healed SQ injection sites.   Neurological:      Mental Status: He is alert and oriented to person, place, and time.      Coordination: Coordination is intact.      Gait: Gait is intact.   Psychiatric:         Attention and Perception: Attention normal.         Mood and Affect: Mood normal.         Speech: Speech normal.         Behavior: Behavior normal. Behavior is cooperative.         Thought Content: Thought content normal.         Cognition and Memory: Cognition normal.     Assessment / Plan:     Diagnoses and all orders for this visit:    Type 2 diabetes mellitus with hyperglycemia, without long-term current use of insulin  -     Hemoglobin A1C; Future    Benign essential hypertension    Dyslipidemia associated with type 2 diabetes mellitus    Additional Plan Details:  - Condition: suboptimal control, most recent A1c of 7.9% was completed 3 months ago.  - Monitor blood glucose:  2x daily.Goals reviewed. Maintain BG log. Continue digital diabetes medicine program.  - Hypoglycemia protocol: Reviewed and risk discussed.  Notify if BG readings below 80. Protocol printout provided.   - Diet: Limit carbohydrate intake 30-45 grams/meal, 3x daily and 15 grams/snack , limit of two daily.   - Activity: Recommend at least 150 minutes of exercise in a week.  - BP and LDL: Recommend lifestyle modifications and follow up with PCP for management.   - Medication Changes:    - Continue Trulicity 3 mg weekly    - Continue Jardiance 25 mg every morning    - Medication consideration:  Pt has 6-8 wks supply of Trulicity 3 mg, would like to finish current dose. Will send RX for the increased dose after current supply is consumed.  - Lab results: A1C discussed.  - Health Maintenance standards addressed today: A1c to be scheduled.   - Risks of uncontrolled DM explained. Importance of routine foot and eye exams reviewed. Scheduled as appropriate.  -The patient was explained the above plan and given  opportunity to ask questions.  He understands, chooses and consents to this plan and accepts all the risks, which include but are not limited to the risks mentioned above.   - Labs ordered as above.  - CDE follow up: None  - Nurse visit: Deferred  - Follow up: 3 months.       Charlotte T. Delacruz, FNP-C [facility]    A total of 30 minutes was spent in face to face time, of which over 50 % was spent in counseling patient on disease process, complications, treatment, and side effects of medications.

## 2022-07-12 NOTE — PATIENT INSTRUCTIONS
Medication Regimen/Changes:   - Continue Trulicity 3 mg weekly. Once current supply is consumed, increase to Trulicity 4.5 mg weekly.  - Continue Jardiance 25 mg every morning.    Patient Instructions:  Carbohydrate Count: 30-45 grams/meal and 15 grams/snack with limit of 2 snacks per day.  Exercise: Goal is 150 minutes or more per week.  Bring meter and blood sugar log to each appointment.     Goals for Blood Sugar:  Fastin-130 mg/dl  2 hours after meal:  mg/dl  Before Bed: 100-150 mg/dl  If Blood sugar is below 70 mg/dl, DO NOT take diabetes medication. Eat first and then recheck blood sugar in 20 minutes.  Foods to eat if blood sugar is below 70 mg/dl  1/2 glass of orange juice   1/2 regular cola can   3-4 hard candies   3-4 small glucose tabs.   Foods to eat if blood sugar is below 50 mg/dl  1 glass of orange juice  1 regular cola can  8 hard candies  8 small glucose tabs.   If you have repeated eating/blood sugar recheck process 2 times and blood sugar still below 70 mg/dl, call 911.

## 2022-07-18 ENCOUNTER — PATIENT MESSAGE (OUTPATIENT)
Dept: DIABETES | Facility: CLINIC | Age: 62
End: 2022-07-18
Payer: COMMERCIAL

## 2022-07-18 DIAGNOSIS — E11.65 TYPE 2 DIABETES MELLITUS WITH HYPERGLYCEMIA, WITHOUT LONG-TERM CURRENT USE OF INSULIN: ICD-10-CM

## 2022-07-18 RX ORDER — EMPAGLIFLOZIN 25 MG/1
25 TABLET, FILM COATED ORAL DAILY
Qty: 30 TABLET | Refills: 3 | OUTPATIENT
Start: 2022-07-18

## 2022-07-18 NOTE — TELEPHONE ENCOUNTER
Called [facility] spoke with Nancy Pharmacist regarding refill request informing her of below information. She verified patient does have prescription on file along with refills and she would get one ready for patient.     Called to inform patient, no answer/LVM and also sent [employer] message.      Outpatient Medication Detail   Disp Refills Start End JEFF   empagliflozin (JARDIANCE) 25 mg tablet 30 tablet 3 6/6/2022  No   Sig - Route: Take 1 tablet (25 mg total) by mouth once daily. - Oral   Sent to pharmacy as: empagliflozin (JARDIANCE) 25 mg tablet   Class: Normal   Order: [id number]   Date/Time Signed: 6/6/2022 18:12       E-Prescribing Status: Receipt confirmed by pharmacy (6/6/2022  6:12 PM CDT)

## 2022-07-18 NOTE — TELEPHONE ENCOUNTER
Please call patient and inform I sent in RX refill for his Jardiance in June. He has additional 3 refills.

## 2022-08-02 ENCOUNTER — PATIENT MESSAGE (OUTPATIENT)
Dept: INTERNAL MEDICINE | Facility: CLINIC | Age: 62
End: 2022-08-02
Payer: COMMERCIAL

## 2022-08-02 DIAGNOSIS — M1A.09X1 IDIOPATHIC CHRONIC GOUT OF MULTIPLE SITES WITH TOPHUS: Chronic | ICD-10-CM

## 2022-08-02 NOTE — TELEPHONE ENCOUNTER
Care Due:                  Date            Visit Type   Department     Provider  --------------------------------------------------------------------------------                                EP -                              PRIMARY      [facility]  Last Visit: 02-      CARE ([facility])   MEDICINE       Roland Galvan  Next Visit: None Scheduled  None         None Found                                                            Last  Test          Frequency    Reason                     Performed    Due Date  --------------------------------------------------------------------------------    Cr..........  12 months..  colchicine...............  08- 08-    Uric Acid...  12 months..  colchicine...............  Not Found    Overdue    [employer] Embedded Care Gaps. Reference number: [id number]. 8/02/2022   3:26:34 AM CDT

## 2022-08-05 RX ORDER — COLCHICINE 0.6 MG/1
CAPSULE ORAL
Qty: 30 CAPSULE | Refills: 0 | Status: SHIPPED | OUTPATIENT
Start: 2022-08-05

## 2022-08-05 NOTE — TELEPHONE ENCOUNTER
Refractive error Requested Prescriptions   Pending Prescriptions Disp Refills    colchicine (MITIGARE) 0.6 mg Cap 30 capsule 0     Sig: Take 2 capsules at onset of gout attack. May take 1 more capsule 2 hours later if needed. MAX 3 CAPSULES PER DAY. MAX 12 CAPSULES PER WEEK.     #LMRX  TO MY TEAM:    Please notify [facility] of refill response above.   Appointment needed for further refills.   Schedule [facility] for next available regular appointment with me, assuming they haven't already scheduled one.   Please close any care gaps that you can by standing order guidelines.    Thanks!    Future Appointments   Date Time Provider Department Center   10/3/2022 11:00 AM MD BEBETO Domingo [facility]   [facility]   [facility]       HEALTH MAINTENANCE INTERVENTIONS - DUE OR DUE SOON  Health Maintenance Due   Topic Date Due    Pneumococcal Vaccines (Age 0-64) (1 - PCV) Never done    HIV Screening  Never done    Shingles Vaccine (1 of 2) Never done    COVID-19 Vaccine (4 - Booster for Pfizer series) 02/26/2022    Diabetes Urine Screening  08/16/2022

## 2022-09-08 ENCOUNTER — PATIENT OUTREACH (OUTPATIENT)
Dept: ADMINISTRATIVE | Facility: HOSPITAL | Age: 62
End: 2022-09-08
Payer: COMMERCIAL

## 2022-09-08 NOTE — PROGRESS NOTES
BR EGFR REPORT: CMP FOUND FROM 3/29/22 (HISTORICAL) - UPLOADED TO CHART W/ A1C, LIPID, MICRO & HEP C

## 2022-10-04 ENCOUNTER — OFFICE VISIT (OUTPATIENT)
Dept: DIABETES | Facility: CLINIC | Age: 62
End: 2022-10-04
Payer: COMMERCIAL

## 2022-10-04 DIAGNOSIS — E11.69 DYSLIPIDEMIA ASSOCIATED WITH TYPE 2 DIABETES MELLITUS: ICD-10-CM

## 2022-10-04 DIAGNOSIS — I10 BENIGN ESSENTIAL HYPERTENSION: ICD-10-CM

## 2022-10-04 DIAGNOSIS — E11.65 TYPE 2 DIABETES MELLITUS WITH HYPERGLYCEMIA, WITHOUT LONG-TERM CURRENT USE OF INSULIN: Primary | ICD-10-CM

## 2022-10-04 DIAGNOSIS — E78.5 DYSLIPIDEMIA ASSOCIATED WITH TYPE 2 DIABETES MELLITUS: ICD-10-CM

## 2022-10-04 PROCEDURE — 3051F PR MOST RECENT HEMOGLOBIN A1C LEVEL 7.0 - < 8.0%: ICD-10-PCS | Mod: CPTII,95,, | Performed by: NURSE PRACTITIONER

## 2022-10-04 PROCEDURE — 99214 PR OFFICE/OUTPT VISIT, EST, LEVL IV, 30-39 MIN: ICD-10-PCS | Mod: 95,,, | Performed by: NURSE PRACTITIONER

## 2022-10-04 PROCEDURE — 99214 OFFICE O/P EST MOD 30 MIN: CPT | Mod: 95,,, | Performed by: NURSE PRACTITIONER

## 2022-10-04 PROCEDURE — 1160F PR REVIEW ALL MEDS BY PRESCRIBER/CLIN PHARMACIST DOCUMENTED: ICD-10-PCS | Mod: CPTII,95,, | Performed by: NURSE PRACTITIONER

## 2022-10-04 PROCEDURE — 1160F RVW MEDS BY RX/DR IN RCRD: CPT | Mod: CPTII,95,, | Performed by: NURSE PRACTITIONER

## 2022-10-04 PROCEDURE — 1159F MED LIST DOCD IN RCRD: CPT | Mod: CPTII,95,, | Performed by: NURSE PRACTITIONER

## 2022-10-04 PROCEDURE — 3051F HG A1C>EQUAL 7.0%<8.0%: CPT | Mod: CPTII,95,, | Performed by: NURSE PRACTITIONER

## 2022-10-04 PROCEDURE — 1159F PR MEDICATION LIST DOCUMENTED IN MEDICAL RECORD: ICD-10-PCS | Mod: CPTII,95,, | Performed by: NURSE PRACTITIONER

## 2022-10-04 RX ORDER — DULAGLUTIDE 4.5 MG/.5ML
4.5 INJECTION, SOLUTION SUBCUTANEOUS
Qty: 4 PEN | Refills: 6 | Status: SHIPPED | OUTPATIENT
Start: 2022-10-04 | End: 2023-10-04

## 2022-10-04 NOTE — PATIENT INSTRUCTIONS
Medication Regimen:   - Increase Trulicity 4.5 mg weekly   - Continue Jardiance 25 mg every morning    Patient Instructions:  Carbohydrate Count: 30-45 grams/meal and 15 grams/snack with limit of 2 snacks per day.  Exercise: Goal is 150 minutes or more per week.  Bring meter and blood sugar log to each appointment.     Goals for Blood Sugar:  Fastin-130 mg/dl  2 hours after meal:  mg/dl  Before Bed: 100-150 mg/dl  If Blood sugar is below 70 mg/dl, DO NOT take diabetes medication. Eat first and then recheck blood sugar in 20 minutes.  Foods to eat if blood sugar is below 70 mg/dl  1/2 glass of orange juice   1/2 regular cola can   3-4 hard candies   3-4 small glucose tabs.   Foods to eat if blood sugar is below 50 mg/dl  1 glass of orange juice  1 regular cola can  8 hard candies  8 small glucose tabs.   If you have repeated eating/blood sugar recheck process 2 times and blood sugar still below 70 mg/dl, call 911.

## 2022-10-04 NOTE — PROGRESS NOTES
Established Patient - Virtual Telehealth Visit     The patient location is: Home ([place])  The chief complaint leading to consultation is: Diabetes Follow-up  Visit type: Virtual visit with synchronous audio and video via Epic [employer]  Total time spent with patient: 15 minutes     Each patient to whom I provide medical services by telemedicine is:  (1) informed of the relationship between the physician and patient and the respective role of any other health care provider with respect to management of the patient; and (2) notified that they may decline to receive medical services by telemedicine and may withdraw from such care at any time. Patient verbally consented to receive this service via voice-only telephone call.    PCP: AUDREY Galvan MD    Subjective:     Chief Complaint: Diabetes follow up.  Last visit with me:2022    HPI: 61 y.o.  male for diabetes follow up.   Previously managed by KLAUS Andrade PA-C.   Last clinic visit 10/2019.  Prediabetic for several years.  Type II diabetes since  .  Comorbidities: HTN, HLD, Fatty Liver and Overweight by BMI  Diabetes complications: without complications     Interval history:  Denies recent hospital admissions or ER visits.   Denies having hypoglycemia.   Endorses diabetes related symptoms: Polydipsia, improving.    Blood glucose monitoring via fingerstick: 1 x/day, inconsistent  Enrolled in digital diabetes medicine.    Diabetes Digital Medicine Flowsheet Readings    [place]'s recent readings (past 60 days):  Time Taken Glucose (mg/dL)   10/1/2022  8:20    2022  6:30    2022  6:59    2022  8:55    9/15/2022  6:50    2022  6:27    2022  6:37      Per digital diabetes medicine entries over the last 1 month,   Fasting BG range 120-160's    Activity level: Moderately Active- running 3x/week  Meal Plannin-3 meals/day;infrequent snacks/day.  Medication compliance all of the  time, diet compliance most of the time.   Has attended diabetes education in the past.      Previous regimen: Jardiance, Amaryl, Metformin     Past failed treatment: Metformin - diarrhea    Current DM Medications:  Diabetes Medications               dulaglutide (TRULICITY) 3 mg/0.5 mL pen injector Inject 3 mg into the skin every 7 days.    empagliflozin (JARDIANCE) 25 mg tablet Take 1 tablet (25 mg total) by mouth once daily.       Allergies  Review of patient's allergies indicates:  No Known Allergies    Labs Reviewed:  His most recent A1C is:  Lab Results   Component Value Date    HGBA1C 7.9 (H) 03/29/2022    HGBA1C 7.9 (H) 03/29/2022    HGBA1C 11.9 (H) 12/08/2021       His most recent BMP is:  Lab Results   Component Value Date     03/29/2022    K 4.3 03/29/2022     03/29/2022    CO2 24 03/29/2022    BUN 22 03/29/2022    CREATININE 1.2 03/29/2022    CALCIUM 9.7 03/29/2022    ANIONGAP 15 08/16/2021    ESTGFRAFRICA >60.0 08/16/2021    EGFRNONAA >60.0 08/16/2021       Lab Results   Component Value Date    CPEPTIDE 2.75 06/28/2017     Lab Results   Component Value Date    GLUTAMICACID 0.00 06/28/2017       There is no height or weight on file to calculate BMI.    Wt Readings from Last 3 Encounters:   07/12/22 1049 89.5 kg (197 lb 5 oz)   02/16/22 0956 90.1 kg (198 lb 10.2 oz)   02/09/22 0905 90.1 kg (198 lb 10.2 oz)        His blood sugar in clinic today is: Not assessed due to type of visit.    Diabetes Management Status  Statin: Not taking  ACE/ARB: Taking    Screening or Prevention Patient's value Goal Complete/Controlled?   HgA1C Testing and Control   Lab Results   Component Value Date    HGBA1C 7.9 (H) 03/29/2022      Annually/Less than 8% Yes     Lipid profile : 03/29/2022 Annually No     LDL control Lab Results   Component Value Date    LDLCALC 167 (H) 03/29/2022    Annually/Less than 100 mg/dl  No     Nephropathy screening Lab Results   Component Value Date    MICALBCREAT Unable to calculate  08/16/2021     No results found for: PROTEINUA Annually No     Blood pressure BP Readings from Last 1 Encounters:   07/12/22 114/75    Less than 140/90 Yes     Dilated retinal exam : 02/18/2022 Annually Yes    Foot exam   : 02/09/2022 Annually Yes       Social History     Socioeconomic History    Marital status:    Tobacco Use    Smoking status: Never    Smokeless tobacco: Never   Substance and Sexual Activity    Alcohol use: Yes    Drug use: No    Sexual activity: Never     Partners: Female     Social Determinants of Health     Financial Resource Strain: Medium Risk    Difficulty of Paying Living Expenses: Somewhat hard   Food Insecurity: Food Insecurity Present    Worried About Running Out of Food in the Last Year: Sometimes true    Ran Out of Food in the Last Year: Never true   Transportation Needs: No Transportation Needs    Lack of Transportation (Medical): No    Lack of Transportation (Non-Medical): No   Physical Activity: Unknown    Days of Exercise per Week: 2 days   Stress: Stress Concern Present    Feeling of Stress : Rather much   Social Connections: Unknown    Frequency of Communication with Friends and Family: Twice a week    Frequency of Social Gatherings with Friends and Family: Twice a week    Active Member of Clubs or Organizations: Patient refused    Attends Club or Organization Meetings: Patient refused    Marital Status:    Housing Stability: High Risk    Unable to Pay for Housing in the Last Year: Patient refused    Number of Places Lived in the Last Year: 1    Unstable Housing in the Last Year: Yes     Past Medical History:   Diagnosis Date    Acute gout of left ankle 9/24/2018    Anxiety with depression 11/30/2018    Benign essential hypertension 11/8/2018    Borderline systolic HTN     Diabetes mellitus, type 2 Diagnosed 2015    Gastroesophageal reflux disease without esophagitis 11/16/2018    Non-alcoholic fatty liver disease 2/13/2019    Prediabetes     Type 2 diabetes  mellitus with hyperglycemia, without long-term current use of insulin 8/8/2019    Type 2 diabetes mellitus with stage 3 chronic kidney disease, without long-term current use of insulin 8/8/2019    Type 2 diabetes mellitus with stage 3a chronic kidney disease, without long-term current use of insulin 8/8/2019    Type 2 diabetes mellitus without complication, without long-term current use of insulin 11/29/2017     Review of Systems   Constitutional: Negative for activity change, appetite change, fatigue and unexpected weight change.   HENT: Negative for dental problem and trouble swallowing.    Eyes: Negative for visual disturbance.   Respiratory: Negative for chest tightness and shortness of breath.    Cardiovascular: Negative for chest pain, palpitations and leg swelling.   Gastrointestinal: Negative for abdominal pain, constipation, diarrhea, nausea and vomiting.   Endocrine: Positive for polydipsia, improving. Negative for polyphagia and polyuria.   Genitourinary: Negative for difficulty urinating, dysuria, frequency and urgency.        Negative yeast infection   Musculoskeletal: Negative for gait problem, myalgias and neck pain.   Integumentary:  Negative for rash and wound.   Allergic/Immunologic: Negative.    Neurological: Negative for dizziness, syncope, weakness, numbness and headaches.   Hematological: Negative.    Psychiatric/Behavioral: Negative for confusion and sleep disturbance.   All other systems reviewed and are negative.     Objective:      Physical Exam  Constitutional:       General: She is awake.      Appearance: Normal appearance. She is well-developed and well-groomed.   HENT:      Head: Normocephalic and atraumatic.   Eyes:      General: Lids are normal. Gaze aligned appropriately.   Pulmonary:      Effort: Pulmonary effort is normal. No respiratory distress.   Neurological:      Mental Status: She is alert and oriented to person, place, and time.   Psychiatric:         Attention and  Perception: Attention normal.         Mood and Affect: Mood and affect normal.         Speech: Speech normal.         Behavior: Behavior normal.         Thought Content: Thought content normal.         Cognition and Memory: Cognition normal.         Judgment: Judgment normal.     Assessment / Plan:     Diagnoses and all orders for this visit:    Type 2 diabetes mellitus with hyperglycemia, without long-term current use of insulin  -     Hemoglobin A1C; Future  -     dulaglutide (TRULICITY) 4.5 mg/0.5 mL pen injector; Inject 4.5 mg into the skin every 7 days.  -     empagliflozin (JARDIANCE) 25 mg tablet; Take 1 tablet (25 mg total) by mouth once daily.    Benign essential hypertension    Dyslipidemia associated with type 2 diabetes mellitus     Additional Plan Details:  - Condition: suboptimal control, most recent A1c of 7.9% was completed 6 months ago.  - Monitor blood glucose:  2x daily.Goals reviewed. Maintain BG log. Continue digital diabetes medicine program.  - Hypoglycemia protocol: Reviewed and risk discussed.  Notify if BG readings below 80. Protocol printout provided.   - Diet: Limit carbohydrate intake 30-45 grams/meal, 3x daily and 15 grams/snack , limit of two daily.   - Activity: Recommend at least 150 minutes of exercise in a week.  - BP and LDL: Recommend lifestyle modifications and follow up with PCP for management.   - Medication Regimen:    - Increase Trulicity 4.5 mg weekly    - Continue Jardiance 25 mg every morning    - Medication consideration:  Trulicity dose increased.  - Lab results: A1C discussed.  - Health Maintenance standards addressed today: A1c to be scheduled.   - Risks of uncontrolled DM explained. Importance of routine foot and eye exams reviewed. Scheduled as appropriate.  -The patient was explained the above plan and given opportunity to ask questions.  He understands, chooses and consents to this plan and accepts all the risks, which include but are not limited to the risks  mentioned above.   - Labs ordered as above.  - CDE follow up: None  - Nurse visit: Deferred  - Follow up: 3 months.         Charlotte T. Delacruz, FNP-C [facility]    A total of 15 minutes was spent in face to face time, of which over 50 % was spent in counseling patient on disease process, complications, treatment, and side effects of medications.

## 2022-10-05 ENCOUNTER — LAB VISIT (OUTPATIENT)
Dept: LAB | Facility: HOSPITAL | Age: 62
End: 2022-10-05
Attending: NURSE PRACTITIONER
Payer: COMMERCIAL

## 2022-10-05 ENCOUNTER — PATIENT MESSAGE (OUTPATIENT)
Dept: DIABETES | Facility: CLINIC | Age: 62
End: 2022-10-05
Payer: COMMERCIAL

## 2022-10-05 DIAGNOSIS — E11.65 TYPE 2 DIABETES MELLITUS WITH HYPERGLYCEMIA, WITHOUT LONG-TERM CURRENT USE OF INSULIN: ICD-10-CM

## 2022-10-05 LAB
ESTIMATED AVG GLUCOSE: 194 MG/DL (ref 68–131)
HBA1C MFR BLD: 8.4 % (ref 4–5.6)

## 2022-10-05 PROCEDURE — 83036 HEMOGLOBIN GLYCOSYLATED A1C: CPT | Performed by: NURSE PRACTITIONER

## 2022-10-05 PROCEDURE — 36415 COLL VENOUS BLD VENIPUNCTURE: CPT | Performed by: NURSE PRACTITIONER

## 2022-10-06 ENCOUNTER — PATIENT MESSAGE (OUTPATIENT)
Dept: DIABETES | Facility: CLINIC | Age: 62
End: 2022-10-06
Payer: COMMERCIAL

## 2022-10-06 ENCOUNTER — TELEPHONE (OUTPATIENT)
Dept: DIABETES | Facility: CLINIC | Age: 62
End: 2022-10-06
Payer: COMMERCIAL

## 2022-10-06 DIAGNOSIS — E11.65 TYPE 2 DIABETES MELLITUS WITH HYPERGLYCEMIA, WITHOUT LONG-TERM CURRENT USE OF INSULIN: Primary | ICD-10-CM

## 2022-10-13 ENCOUNTER — TELEPHONE (OUTPATIENT)
Dept: DIABETES | Facility: CLINIC | Age: 62
End: 2022-10-13
Payer: COMMERCIAL

## 2022-10-14 ENCOUNTER — PATIENT MESSAGE (OUTPATIENT)
Dept: DIABETES | Facility: CLINIC | Age: 62
End: 2022-10-14
Payer: COMMERCIAL

## 2022-11-16 NOTE — TELEPHONE ENCOUNTER
----- Message from Barbara Butler sent at 11/27/2018  3:16 PM CST -----  Contact: pt  Calling in regards to make sure if you received [facility] paperwork that was faxed to you and please advise [phone]  
Sent patient a [employer] message with a different number to fax the paperwork to Dr Galvan to fill out  
Colorectal Surgery

## 2022-12-19 ENCOUNTER — TELEPHONE (OUTPATIENT)
Dept: DIABETES | Facility: CLINIC | Age: 62
End: 2022-12-19
Payer: COMMERCIAL

## 2025-02-18 DIAGNOSIS — M1A.09X1 IDIOPATHIC CHRONIC GOUT OF MULTIPLE SITES WITH TOPHUS: Chronic | ICD-10-CM

## 2025-02-19 NOTE — TELEPHONE ENCOUNTER
Refill Routing Note   Medication(s) are not appropriate for processing by [facility] for the following reason(s):        Responsible provider unclear    [facility] action(s):  Defer               Appointments  past 12m or future 3m with PCP    Date Provider   Last Visit   2/1/2022 AUDREY Galvan MD   Next Visit   Visit date not found AUDREY Galvan MD   ED visits in past 90 days: 0        Note composed:9:33 PM 02/18/2025

## 2025-02-20 RX ORDER — INDOMETHACIN 25 MG/1
CAPSULE ORAL
Qty: 60 CAPSULE | Refills: 0 | OUTPATIENT
Start: 2025-02-20

## 2025-02-20 NOTE — TELEPHONE ENCOUNTER
Refill not approved.   In-office appointment required for refills.    TO MY TEAM: Notify patient and schedule appointment.  Thanks!